# Patient Record
Sex: MALE | Race: WHITE | NOT HISPANIC OR LATINO | Employment: OTHER | URBAN - METROPOLITAN AREA
[De-identification: names, ages, dates, MRNs, and addresses within clinical notes are randomized per-mention and may not be internally consistent; named-entity substitution may affect disease eponyms.]

---

## 2023-09-12 ENCOUNTER — HOSPITAL ENCOUNTER (OUTPATIENT)
Dept: DATA CONVERSION | Facility: HOSPITAL | Age: 78
Discharge: HOME | End: 2023-09-12
Payer: MEDICARE

## 2023-09-12 DIAGNOSIS — I10 ESSENTIAL (PRIMARY) HYPERTENSION: ICD-10-CM

## 2023-09-12 LAB
ALBUMIN SERPL-MCNC: 2.8 GM/DL (ref 3.5–5)
ALBUMIN/GLOB SERPL: 0.8 RATIO (ref 1.5–3)
ALP BLD-CCNC: 114 U/L (ref 35–125)
ALT SERPL-CCNC: 18 U/L (ref 5–40)
ANION GAP SERPL CALCULATED.3IONS-SCNC: 10 MMOL/L (ref 0–19)
AST SERPL-CCNC: 25 U/L (ref 5–40)
BILIRUB SERPL-MCNC: 0.3 MG/DL (ref 0.1–1.2)
BUN SERPL-MCNC: 20 MG/DL (ref 8–25)
BUN/CREAT SERPL: 25 RATIO (ref 8–21)
CALCIUM SERPL-MCNC: 9.2 MG/DL (ref 8.5–10.4)
CHLORIDE SERPL-SCNC: 107 MMOL/L (ref 97–107)
CO2 SERPL-SCNC: 25 MMOL/L (ref 24–31)
CREAT SERPL-MCNC: 0.8 MG/DL (ref 0.4–1.6)
DEPRECATED RDW RBC AUTO: 46.4 FL (ref 37–54)
ERYTHROCYTE [DISTWIDTH] IN BLOOD BY AUTOMATED COUNT: 13 % (ref 11.7–15)
GFR SERPL CREATININE-BSD FRML MDRD: 91 ML/MIN/1.73 M2
GLOBULIN SER-MCNC: 3.3 G/DL (ref 1.9–3.7)
GLUCOSE SERPL-MCNC: 77 MG/DL (ref 65–99)
HCT VFR BLD AUTO: 37.9 % (ref 41–50)
HGB BLD-MCNC: 12.2 GM/DL (ref 13.5–16.5)
MCH RBC QN AUTO: 31 PG (ref 26–34)
MCHC RBC AUTO-ENTMCNC: 32.2 % (ref 31–37)
MCV RBC AUTO: 96.4 FL (ref 80–100)
NRBC BLD-RTO: 0 /100 WBC
PLATELET # BLD AUTO: 244 K/UL (ref 150–450)
PMV BLD AUTO: 10.5 CU (ref 7–12.6)
POTASSIUM SERPL-SCNC: 4.4 MMOL/L (ref 3.4–5.1)
PROT SERPL-MCNC: 6.1 G/DL (ref 5.9–7.9)
RBC # BLD AUTO: 3.93 M/UL (ref 4.5–5.5)
SODIUM SERPL-SCNC: 141 MMOL/L (ref 133–145)
WBC # BLD AUTO: 5.7 K/UL (ref 4.5–11)

## 2023-09-17 PROBLEM — J34.89 NASAL OBSTRUCTION: Status: ACTIVE | Noted: 2023-09-17

## 2023-09-17 PROBLEM — J32.4 CHRONIC PANSINUSITIS: Status: ACTIVE | Noted: 2023-09-17

## 2023-09-17 PROBLEM — J33.0 NASAL CAVITY POLYP: Status: ACTIVE | Noted: 2023-09-17

## 2023-09-17 PROBLEM — I10 HYPERTENSION: Status: ACTIVE | Noted: 2023-09-17

## 2023-09-17 PROBLEM — E78.00 PURE HYPERCHOLESTEROLEMIA: Status: ACTIVE | Noted: 2023-09-17

## 2023-09-17 PROBLEM — R63.4 WEIGHT LOSS: Status: ACTIVE | Noted: 2023-09-17

## 2023-09-17 PROBLEM — R43.0 ANOSMIA: Status: ACTIVE | Noted: 2023-09-17

## 2023-09-17 PROBLEM — S39.92XA BACK INJURY: Status: ACTIVE | Noted: 2023-09-17

## 2023-09-17 PROBLEM — J32.1 CHRONIC FRONTAL SINUSITIS: Status: ACTIVE | Noted: 2023-09-17

## 2023-09-17 PROBLEM — N40.0 BENIGN PROSTATIC HYPERPLASIA: Status: ACTIVE | Noted: 2023-09-17

## 2023-09-17 PROBLEM — J34.89 RHINORRHEA: Status: ACTIVE | Noted: 2023-09-17

## 2023-09-17 PROBLEM — R29.6 RECURRENT FALLS: Status: ACTIVE | Noted: 2023-09-17

## 2023-09-17 PROBLEM — D72.819 LEUKOPENIA: Status: ACTIVE | Noted: 2023-09-17

## 2023-09-17 PROBLEM — S69.90XA INJURY OF WRIST: Status: ACTIVE | Noted: 2023-09-17

## 2023-09-17 PROBLEM — H90.3 ASYMMETRICAL SENSORINEURAL HEARING LOSS: Status: ACTIVE | Noted: 2023-09-17
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
## 2023-09-17 PROBLEM — S09.90XA INJURY OF HEAD: Status: ACTIVE | Noted: 2023-09-17

## 2023-09-17 PROBLEM — I83.93 VARICOSE VEINS OF BOTH LOWER EXTREMITIES: Status: ACTIVE | Noted: 2023-09-17

## 2023-09-17 PROBLEM — J32.2 CHRONIC ETHMOIDAL SINUSITIS: Status: ACTIVE | Noted: 2023-09-17

## 2023-09-17 PROBLEM — R26.2 DISABILITY OF WALKING: Status: ACTIVE | Noted: 2023-09-17

## 2023-09-17 PROBLEM — R04.0 LEFT-SIDED EPISTAXIS: Status: ACTIVE | Noted: 2023-09-17

## 2023-09-17 PROBLEM — R53.1 ASTHENIA: Status: ACTIVE | Noted: 2023-09-17

## 2023-09-17 PROBLEM — H93.13 TINNITUS OF BOTH EARS: Status: ACTIVE | Noted: 2023-09-17

## 2023-09-17 PROBLEM — Z87.39 H/O DEGENERATIVE DISC DISEASE: Status: ACTIVE | Noted: 2023-09-17

## 2023-09-17 PROBLEM — R43.8 DECREASED SENSE OF SMELL: Status: ACTIVE | Noted: 2023-09-17

## 2023-09-17 PROBLEM — M51.34 DEGENERATION OF THORACIC INTERVERTEBRAL DISC: Status: ACTIVE | Noted: 2023-09-17

## 2023-09-17 PROBLEM — H69.92 DYSFUNCTION OF LEFT EUSTACHIAN TUBE: Status: ACTIVE | Noted: 2023-09-17

## 2023-09-17 RX ORDER — AZELASTINE HCL 205.5 UG/1
2 SPRAY NASAL 2 TIMES DAILY
COMMUNITY
Start: 2015-07-13 | End: 2024-04-11 | Stop reason: WASHOUT

## 2023-09-17 RX ORDER — DOCUSATE SODIUM 100 MG/1
100 CAPSULE, LIQUID FILLED ORAL DAILY
COMMUNITY

## 2023-09-17 RX ORDER — IBUPROFEN 200 MG
400 TABLET ORAL 2 TIMES DAILY PRN
COMMUNITY

## 2023-09-17 RX ORDER — CETIRIZINE HYDROCHLORIDE 10 MG/1
10 TABLET, ORALLY DISINTEGRATING ORAL DAILY PRN
COMMUNITY
End: 2024-04-11 | Stop reason: WASHOUT

## 2023-09-17 RX ORDER — FLUTICASONE PROPIONATE 50 MCG
2 SPRAY, SUSPENSION (ML) NASAL DAILY
COMMUNITY
Start: 2015-10-14 | End: 2024-04-11 | Stop reason: WASHOUT

## 2023-09-17 RX ORDER — HYDROCHLOROTHIAZIDE 25 MG/1
25 TABLET ORAL DAILY
COMMUNITY
Start: 2023-08-10 | End: 2023-12-26

## 2023-09-17 RX ORDER — TAMSULOSIN HYDROCHLORIDE 0.4 MG/1
0.4 CAPSULE ORAL DAILY
COMMUNITY
Start: 2022-11-21 | End: 2023-02-19

## 2023-12-12 ENCOUNTER — TELEPHONE (OUTPATIENT)
Dept: PRIMARY CARE | Facility: CLINIC | Age: 78
End: 2023-12-12
Payer: MEDICARE

## 2023-12-12 DIAGNOSIS — R30.0 DYSURIA: Primary | ICD-10-CM

## 2023-12-12 NOTE — TELEPHONE ENCOUNTER
Pt has frequency of urination, itching in perineal area, and incontinent. Pt in wheelchair, spouse asks for UA order.  Order or appt with NP?

## 2023-12-13 ENCOUNTER — LAB (OUTPATIENT)
Dept: LAB | Facility: LAB | Age: 78
End: 2023-12-13
Payer: MEDICARE

## 2023-12-13 DIAGNOSIS — R30.0 DYSURIA: ICD-10-CM

## 2023-12-13 LAB
APPEARANCE UR: CLEAR
BILIRUB UR STRIP.AUTO-MCNC: NEGATIVE MG/DL
COLOR UR: NORMAL
GLUCOSE UR STRIP.AUTO-MCNC: NORMAL MG/DL
KETONES UR STRIP.AUTO-MCNC: NEGATIVE MG/DL
LEUKOCYTE ESTERASE UR QL STRIP.AUTO: NEGATIVE
NITRITE UR QL STRIP.AUTO: NEGATIVE
PH UR STRIP.AUTO: 7 [PH]
PROT UR STRIP.AUTO-MCNC: NEGATIVE MG/DL
RBC # UR STRIP.AUTO: NEGATIVE /UL
SP GR UR STRIP.AUTO: 1.02
UROBILINOGEN UR STRIP.AUTO-MCNC: NORMAL MG/DL

## 2023-12-13 PROCEDURE — 87086 URINE CULTURE/COLONY COUNT: CPT

## 2023-12-13 PROCEDURE — 81003 URINALYSIS AUTO W/O SCOPE: CPT

## 2023-12-14 ENCOUNTER — TELEPHONE (OUTPATIENT)
Dept: PRIMARY CARE | Facility: CLINIC | Age: 78
End: 2023-12-14
Payer: MEDICARE

## 2023-12-14 DIAGNOSIS — R32 URINARY INCONTINENCE, UNSPECIFIED TYPE: Primary | ICD-10-CM

## 2023-12-14 PROBLEM — R26.2 DIFFICULTY IN WALKING, NOT ELSEWHERE CLASSIFIED: Status: RESOLVED | Noted: 2023-04-26 | Resolved: 2023-12-14

## 2023-12-14 PROBLEM — M41.9 SCOLIOSIS, UNSPECIFIED: Status: ACTIVE | Noted: 2023-04-26

## 2023-12-14 PROBLEM — L03.116 CELLULITIS OF LEFT LOWER LIMB: Status: RESOLVED | Noted: 2023-04-26 | Resolved: 2023-12-14

## 2023-12-14 PROBLEM — M54.30 SCIATICA, UNSPECIFIED SIDE: Status: ACTIVE | Noted: 2023-04-26

## 2023-12-14 PROBLEM — R41.841 COGNITIVE COMMUNICATION DEFICIT: Status: RESOLVED | Noted: 2023-04-26 | Resolved: 2023-12-14

## 2023-12-14 PROBLEM — M62.81 MUSCLE WEAKNESS (GENERALIZED): Status: RESOLVED | Noted: 2023-04-26 | Resolved: 2023-12-14

## 2023-12-14 PROBLEM — R29.6 REPEATED FALLS: Status: RESOLVED | Noted: 2023-04-26 | Resolved: 2023-12-14

## 2023-12-14 NOTE — TELEPHONE ENCOUNTER
Per Dr Mascorro:  Yes need to see urology  He might also be having retention of urine .Will put in referral for   If symptoms persist then will need to be  seen in ER.   Spoke with pt's wife, gave her dr faria's number

## 2023-12-14 NOTE — TELEPHONE ENCOUNTER
"Spoke with patient's wife and gave her results. She said that \"well something is going on, he's peeing all the time and he's leaking as well, he can't stop it.\"  Do you want him to see urology?  "

## 2023-12-16 LAB — BACTERIA UR CULT: NORMAL

## 2023-12-23 DIAGNOSIS — I10 ESSENTIAL (PRIMARY) HYPERTENSION: ICD-10-CM

## 2023-12-26 RX ORDER — HYDROCHLOROTHIAZIDE 25 MG/1
TABLET ORAL
Qty: 90 TABLET | Refills: 0 | Status: SHIPPED | OUTPATIENT
Start: 2023-12-26 | End: 2024-04-11 | Stop reason: WASHOUT

## 2024-03-08 PROBLEM — J33.0 POLYP OF NASAL CAVITY: Status: RESOLVED | Noted: 2024-03-08 | Resolved: 2024-03-08

## 2024-03-08 PROBLEM — S00.83XA CONTUSION OF FACE: Status: RESOLVED | Noted: 2024-03-08 | Resolved: 2024-03-08

## 2024-03-08 PROBLEM — S01.81XA FACIAL LACERATION: Status: RESOLVED | Noted: 2024-03-08 | Resolved: 2024-03-08

## 2024-03-08 PROBLEM — R10.9 ABDOMINAL PAIN: Status: RESOLVED | Noted: 2024-03-08 | Resolved: 2024-03-08

## 2024-03-13 ENCOUNTER — APPOINTMENT (OUTPATIENT)
Dept: RADIOLOGY | Facility: HOSPITAL | Age: 79
End: 2024-03-13
Payer: MEDICARE

## 2024-03-13 ENCOUNTER — HOSPITAL ENCOUNTER (EMERGENCY)
Facility: HOSPITAL | Age: 79
Discharge: HOME | End: 2024-03-13
Attending: STUDENT IN AN ORGANIZED HEALTH CARE EDUCATION/TRAINING PROGRAM
Payer: MEDICARE

## 2024-03-13 VITALS
RESPIRATION RATE: 16 BRPM | TEMPERATURE: 98.6 F | SYSTOLIC BLOOD PRESSURE: 134 MMHG | OXYGEN SATURATION: 98 % | HEART RATE: 70 BPM | DIASTOLIC BLOOD PRESSURE: 76 MMHG

## 2024-03-13 DIAGNOSIS — S42.032A DISPLACED FRACTURE OF LATERAL END OF LEFT CLAVICLE, INITIAL ENCOUNTER FOR CLOSED FRACTURE: Primary | ICD-10-CM

## 2024-03-13 PROCEDURE — 73000 X-RAY EXAM OF COLLAR BONE: CPT | Mod: LEFT SIDE | Performed by: RADIOLOGY

## 2024-03-13 PROCEDURE — 73030 X-RAY EXAM OF SHOULDER: CPT | Mod: LEFT SIDE | Performed by: RADIOLOGY

## 2024-03-13 PROCEDURE — 73000 X-RAY EXAM OF COLLAR BONE: CPT | Mod: LT

## 2024-03-13 PROCEDURE — 99284 EMERGENCY DEPT VISIT MOD MDM: CPT

## 2024-03-13 PROCEDURE — 2500000001 HC RX 250 WO HCPCS SELF ADMINISTERED DRUGS (ALT 637 FOR MEDICARE OP): Performed by: NURSE PRACTITIONER

## 2024-03-13 PROCEDURE — 73030 X-RAY EXAM OF SHOULDER: CPT | Mod: LT

## 2024-03-13 RX ORDER — ACETAMINOPHEN 325 MG/1
975 TABLET ORAL ONCE
Status: COMPLETED | OUTPATIENT
Start: 2024-03-13 | End: 2024-03-13

## 2024-03-13 RX ORDER — ACETAMINOPHEN 500 MG
1000 TABLET ORAL 2 TIMES DAILY
Qty: 28 TABLET | Refills: 0 | Status: SHIPPED | OUTPATIENT
Start: 2024-03-13 | End: 2024-03-20

## 2024-03-13 RX ORDER — OXYCODONE AND ACETAMINOPHEN 5; 325 MG/1; MG/1
1 TABLET ORAL 2 TIMES DAILY
Qty: 6 TABLET | Refills: 0 | Status: SHIPPED | OUTPATIENT
Start: 2024-03-13 | End: 2024-03-16

## 2024-03-13 RX ORDER — NAPROXEN 500 MG/1
500 TABLET ORAL
Qty: 14 TABLET | Refills: 0 | Status: SHIPPED | OUTPATIENT
Start: 2024-03-13 | End: 2024-03-20

## 2024-03-13 RX ORDER — NAPROXEN 250 MG/1
500 TABLET ORAL ONCE
Status: COMPLETED | OUTPATIENT
Start: 2024-03-13 | End: 2024-03-13

## 2024-03-13 RX ADMIN — NAPROXEN 500 MG: 250 TABLET ORAL at 17:20

## 2024-03-13 RX ADMIN — ACETAMINOPHEN 975 MG: 325 TABLET ORAL at 17:20

## 2024-03-13 ASSESSMENT — COLUMBIA-SUICIDE SEVERITY RATING SCALE - C-SSRS
6. HAVE YOU EVER DONE ANYTHING, STARTED TO DO ANYTHING, OR PREPARED TO DO ANYTHING TO END YOUR LIFE?: NO
2. HAVE YOU ACTUALLY HAD ANY THOUGHTS OF KILLING YOURSELF?: NO
1. IN THE PAST MONTH, HAVE YOU WISHED YOU WERE DEAD OR WISHED YOU COULD GO TO SLEEP AND NOT WAKE UP?: NO

## 2024-03-13 ASSESSMENT — LIFESTYLE VARIABLES
EVER HAD A DRINK FIRST THING IN THE MORNING TO STEADY YOUR NERVES TO GET RID OF A HANGOVER: NO
EVER FELT BAD OR GUILTY ABOUT YOUR DRINKING: NO
HAVE PEOPLE ANNOYED YOU BY CRITICIZING YOUR DRINKING: NO
HAVE YOU EVER FELT YOU SHOULD CUT DOWN ON YOUR DRINKING: NO

## 2024-03-13 ASSESSMENT — PAIN SCALES - GENERAL: PAINLEVEL_OUTOF10: 7

## 2024-03-13 ASSESSMENT — PAIN DESCRIPTION - ORIENTATION: ORIENTATION: LEFT

## 2024-03-13 ASSESSMENT — PAIN DESCRIPTION - LOCATION: LOCATION: SHOULDER

## 2024-03-13 ASSESSMENT — PAIN DESCRIPTION - PAIN TYPE: TYPE: ACUTE PAIN

## 2024-03-13 ASSESSMENT — PAIN DESCRIPTION - DESCRIPTORS: DESCRIPTORS: ACHING;SHARP

## 2024-03-13 ASSESSMENT — PAIN - FUNCTIONAL ASSESSMENT: PAIN_FUNCTIONAL_ASSESSMENT: 0-10

## 2024-03-13 NOTE — ED PROVIDER NOTES
"HPI   Chief Complaint   Patient presents with    Shoulder Pain     Tripped and fell in the house @ noon.  Shoulder cracked and disfigured.        78-year-old male presents today with acute left shoulder injury.  He is wheelchair-bound due to having curvature of the spine and wife indicates that \"he thinks he can walk when he really should be walking\".  She indicates that his wheelchair was not locked and it went backwards while he was going to stand up and he felt forward landing on his left shoulder.  She denies that he struck his head.  She denies posterior neck pain.  She denies a complaint from her  of chest pain or weakness.  She denies any complaint of nausea vomiting or abdominal pain.  He is not on any anticoagulant medication and wife states \"he has not been on any medication and does not need any medication\".  His vital signs are very stable.  Wife indicates that he has a history of dementia and he he is cared for by wife and sons who do not live at home but they come if wife needs him.  Wife also indicates that he has no history of Parkinson's and she denies any recent shaking.  Their pharmacy is at [facility].  Wife denies any change in mentation.  She denies any neurodeficit except for memory and wife indicates he can remember everything remotely but he has more difficulty with short-term memory.      History provided by:  Patient and spouse   used: No                        [place] Coma Scale Score: 15         NIH Stroke Scale: 0             Patient History   Past Medical History:   Diagnosis Date    Abdominal pain 03/08/2024    Allergic rhinitis     Arthritis     Back pain     BPH (benign prostatic hyperplasia)     Cellulitis of left lower limb 04/26/2023    Contusion of face 03/08/2024    DDD (degenerative disc disease), lumbar     DDD (degenerative disc disease), thoracic     Difficulty in walking, not elsewhere classified 04/26/2023    " Facial laceration 03/08/2024    Hiatal hernia     HTN (hypertension)     Hypercholesteremia     Leukopenia     Muscle weakness (generalized) 04/26/2023    Personal history of other specified conditions 06/23/2014    History of shortness of breath    Polyp of nasal cavity 03/08/2024    Repeated falls 04/26/2023    Varicose veins of both legs with edema      Past Surgical History:   Procedure Laterality Date    MYRINGOTOMY W/ TUBES  10/30/2013    Myringotomy - With Ventilating Tube Insertion    SEPTOPLASTY  09/16/2014    Septoplasty    TONSILLECTOMY  10/30/2013    Tonsillectomy With Adenoidectomy     Family History   Problem Relation Name Age of Onset    Hypertension Mother      Dementia Mother      Lung cancer Father      Stroke Other Grandfather      Social History     Tobacco Use    Smoking status: Not on file    Smokeless tobacco: Not on file   Substance Use Topics    Alcohol use: Not on file    Drug use: Not on file       Physical Exam   ED Triage Vitals [03/13/24 1353]   Temperature Heart Rate Respirations BP   37 °C (98.6 °F) 70 16 134/76      Pulse Ox Temp Source Heart Rate Source Patient Position   98 % Skin Monitor Sitting      BP Location FiO2 (%)     Left arm --       Physical Exam  Constitutional:       Appearance: Normal appearance.   HENT:      Head: Normocephalic and atraumatic.      Right Ear: Tympanic membrane normal.      Left Ear: Tympanic membrane normal.      Nose: Nose normal.      Mouth/Throat:      Mouth: Mucous membranes are dry.   Eyes:      Extraocular Movements: Extraocular movements intact.      Pupils: Pupils are equal, round, and reactive to light.   Cardiovascular:      Rate and Rhythm: Normal rate and regular rhythm.      Pulses: Normal pulses.      Heart sounds: Normal heart sounds.   Pulmonary:      Effort: Pulmonary effort is normal.      Breath sounds: Normal breath sounds.   Abdominal:      General: Abdomen is flat.      Palpations: Abdomen is soft.   Musculoskeletal:          General: Tenderness present.      Comments: Limited range of motion of left upper extremity with radial pulse 2/2.  He is able to move elbow without difficulty.  He is able to move wrist and make a fist with his left hand.  It is painful for him to lift his arm more than 30 degrees.   Skin:     General: Skin is warm.      Capillary Refill: Capillary refill takes less than 2 seconds.   Neurological:      General: No focal deficit present.      Mental Status: He is alert and oriented to person, place, and time.      Comments: NIH is 0 stroke van negative.  There was no neurodeficit and patient was responding to all questions.  He could identify a pen.  He knew he was in the hospital.  He knew that his wife was sitting beside him.  No neglect appreciated.   Psychiatric:         Mood and Affect: Mood normal.         Behavior: Behavior normal.         ED Course & MDM   Diagnoses as of 03/13/24 1729   Displaced fracture of lateral end of left clavicle, initial encounter for closed fracture       Medical Decision Making  X-ray confirmed a displaced fracture of the distal left clavicle similar to the earlier study of the same day.  There was no acute bony abnormality of the rest of the shoulder.  Note was sent to orthopedic on-call that we will have patient follow outpatient and a sling was ordered.  He can use Motrin and Tylenol for limited pain and I gave him a limited supply of Percocet for severe pain.  Patient appears to be well cared for by wife.  NIH was 0.  Stroke van was negative.  He was denying any chest pain or weakness and the fall was mechanical.  Safely discharged home with contact information for orthopedic surgery and I requested appointment.    Amount and/or Complexity of Data Reviewed  Radiology: ordered and independent interpretation performed.        Procedure  Procedures     SANCHO Paez-JUVENAL  03/13/24 1728

## 2024-03-27 ENCOUNTER — OFFICE VISIT (OUTPATIENT)
Dept: ORTHOPEDIC SURGERY | Facility: CLINIC | Age: 79
End: 2024-03-27
Payer: MEDICARE

## 2024-03-27 ENCOUNTER — HOSPITAL ENCOUNTER (OUTPATIENT)
Dept: RADIOLOGY | Facility: HOSPITAL | Age: 79
Discharge: HOME | End: 2024-03-27
Payer: MEDICARE

## 2024-03-27 VITALS — HEIGHT: 69 IN | BODY MASS INDEX: 22.96 KG/M2 | WEIGHT: 155 LBS

## 2024-03-27 DIAGNOSIS — S42.022A DISPLACED FRACTURE OF SHAFT OF LEFT CLAVICLE, INITIAL ENCOUNTER FOR CLOSED FRACTURE: Primary | ICD-10-CM

## 2024-03-27 DIAGNOSIS — M25.512 LEFT SHOULDER PAIN, UNSPECIFIED CHRONICITY: ICD-10-CM

## 2024-03-27 PROCEDURE — 99204 OFFICE O/P NEW MOD 45 MIN: CPT | Performed by: ORTHOPAEDIC SURGERY

## 2024-03-27 PROCEDURE — 1159F MED LIST DOCD IN RCRD: CPT | Performed by: ORTHOPAEDIC SURGERY

## 2024-03-27 PROCEDURE — 1125F AMNT PAIN NOTED PAIN PRSNT: CPT | Performed by: ORTHOPAEDIC SURGERY

## 2024-03-27 PROCEDURE — 73030 X-RAY EXAM OF SHOULDER: CPT | Mod: LT

## 2024-03-27 PROCEDURE — 73030 X-RAY EXAM OF SHOULDER: CPT | Mod: LEFT SIDE | Performed by: STUDENT IN AN ORGANIZED HEALTH CARE EDUCATION/TRAINING PROGRAM

## 2024-03-27 PROCEDURE — 23500 CLTX CLAVICULAR FX W/O MNPJ: CPT | Performed by: ORTHOPAEDIC SURGERY

## 2024-03-27 PROCEDURE — 1160F RVW MEDS BY RX/DR IN RCRD: CPT | Performed by: ORTHOPAEDIC SURGERY

## 2024-03-27 PROCEDURE — 1036F TOBACCO NON-USER: CPT | Performed by: ORTHOPAEDIC SURGERY

## 2024-03-27 ASSESSMENT — PAIN SCALES - GENERAL: PAINLEVEL_OUTOF10: 8

## 2024-03-27 ASSESSMENT — PAIN - FUNCTIONAL ASSESSMENT: PAIN_FUNCTIONAL_ASSESSMENT: 0-10

## 2024-03-27 NOTE — PROGRESS NOTES
78-year-old male with dementia and severe kyphosis presents with his wife.  The patient does not talk very much so history is required to get from the patient's wife.  Patient states he has had severe kyphosis for at least the last 3 years and has been getting dementia.  He fell 2 weeks ago and injured the left shoulder.  He has not been complaining of too much pain recently but states pain is worse when he raises the arm    Patients' self reported past medical history, medications, allergies, surgical history, family and social history as well as a 10 point review of systems has been documented in the new patient intake form and scanned into the patient's electronic medical record.  The intake form was reviewed by Dr Han during the office visit and signed by Dr. Han and the patient.  Pertinent findings are documented in the HPI.    General Multi-System Physical Exam:  Constitutional  General appearance:  Alert, oriented, and in no acute distress.  Well developed, well nourished.  Head and Face  Head and face:  Normocephalic and atraumatic.  Ears, Nose, Mouth, and Throat  External inspection of ears and nose: Normal.  Eyes:  Pupils are equal and round.  Neck  Neck:  no neck mass was observed.  Pulmonary  Respiratory effort:  no respiratory distress.  Cardiovascular  Intact distal pulses.  Lymphatic  Palpation of lymph nodes in the affected extremity:  Normal.  Skin  Skin and subcutaneous tissue:  Normal skin color and pigmentation.  Normal skin turgor.  No rashes.  Neurologic  Sensation:  normal to light touch.  Psychiatric  Judgement and insight:  Intact.  Mood and affect:  Normal.  Musculoskeletal  The patient is severely kyphotic with very little motion of his neck and shoulders.  He has mild pain at the palpable deformity of his left clavicle with no skin tenting.  Grossly neurovascular tact left upper extremity    X-rays of the patient were ordered by Dr Han and obtained today.  Dr Han personally  reviewed the results of the x-rays.    In addition, Dr Han independently interpreted the patient's x-rays (performed by the Radiology department) by viewing the x-ray images and this is Dr. Han's personal interpretation:     Left clavicle fracture midshaft displaced    I explained to them the clavicle fractures generally heal very well and hopefully this will heal.  However the fracture is displaced.  Due to the patient's severe medical comorbidities with the severe kyphosis and dementia, this patient is not a surgical candidate.  Will therefore treat him conservatively and hopefully the fracture will heal but due to his kyphotic deformity we cannot perform plating of the clavicle even if we wanted to.  I will see him back in 4 to 6 weeks for new x-rays.  Told him to call with questions or problems    This is an acute injury complicated by left clavicle fracture with severe medical comorbidities      Due to this patient's condition, they are at a moderate risk of morbidity from additional diagnostic testing / treatment.

## 2024-04-09 PROBLEM — R04.0 EPISTAXIS: Status: RESOLVED | Noted: 2023-09-17 | Resolved: 2024-04-09

## 2024-04-09 PROBLEM — S42.033A CLOSED FRACTURE OF ACROMIAL END OF CLAVICLE: Status: RESOLVED | Noted: 2024-04-09 | Resolved: 2024-04-09

## 2024-04-09 PROBLEM — J34.89 NASAL DISCHARGE: Status: RESOLVED | Noted: 2023-09-17 | Resolved: 2024-04-09

## 2024-04-09 PROBLEM — R53.1 WEAKNESS: Status: RESOLVED | Noted: 2023-04-25 | Resolved: 2024-04-09

## 2024-04-09 PROBLEM — S39.92XA INJURY OF BACK: Status: RESOLVED | Noted: 2023-09-17 | Resolved: 2024-04-09

## 2024-04-09 PROBLEM — H69.90 DYSFUNCTION OF EUSTACHIAN TUBE: Status: RESOLVED | Noted: 2023-09-17 | Resolved: 2024-04-09

## 2024-04-09 PROBLEM — J32.0 CHRONIC MAXILLARY SINUSITIS: Status: RESOLVED | Noted: 2023-09-17 | Resolved: 2024-04-09

## 2024-04-09 PROBLEM — M25.512 PAIN OF LEFT SHOULDER REGION: Status: RESOLVED | Noted: 2024-04-09 | Resolved: 2024-04-09

## 2024-04-09 PROBLEM — M41.9 SCOLIOSIS: Status: RESOLVED | Noted: 2023-04-26 | Resolved: 2024-04-09

## 2024-04-11 ENCOUNTER — OFFICE VISIT (OUTPATIENT)
Dept: PRIMARY CARE | Facility: CLINIC | Age: 79
End: 2024-04-11
Payer: MEDICARE

## 2024-04-11 VITALS
DIASTOLIC BLOOD PRESSURE: 70 MMHG | SYSTOLIC BLOOD PRESSURE: 122 MMHG | HEART RATE: 64 BPM | TEMPERATURE: 97.5 F | HEIGHT: 69 IN | OXYGEN SATURATION: 99 % | BODY MASS INDEX: 22.96 KG/M2 | WEIGHT: 155 LBS

## 2024-04-11 DIAGNOSIS — R26.2 DISABILITY OF WALKING: ICD-10-CM

## 2024-04-11 DIAGNOSIS — I83.93 VARICOSE VEINS OF BOTH LOWER EXTREMITIES, UNSPECIFIED WHETHER COMPLICATED: Primary | ICD-10-CM

## 2024-04-11 DIAGNOSIS — R29.6 RECURRENT FALLS: ICD-10-CM

## 2024-04-11 DIAGNOSIS — E55.9 VITAMIN D DEFICIENCY: ICD-10-CM

## 2024-04-11 DIAGNOSIS — N40.1 BENIGN PROSTATIC HYPERPLASIA WITH URINARY FREQUENCY: ICD-10-CM

## 2024-04-11 DIAGNOSIS — M41.9 SCOLIOSIS OF THORACOLUMBAR SPINE, UNSPECIFIED SCOLIOSIS TYPE: ICD-10-CM

## 2024-04-11 DIAGNOSIS — D64.9 ANEMIA, UNSPECIFIED TYPE: ICD-10-CM

## 2024-04-11 DIAGNOSIS — R35.0 BENIGN PROSTATIC HYPERPLASIA WITH URINARY FREQUENCY: ICD-10-CM

## 2024-04-11 PROCEDURE — 3074F SYST BP LT 130 MM HG: CPT | Performed by: INTERNAL MEDICINE

## 2024-04-11 PROCEDURE — 1160F RVW MEDS BY RX/DR IN RCRD: CPT | Performed by: INTERNAL MEDICINE

## 2024-04-11 PROCEDURE — 3078F DIAST BP <80 MM HG: CPT | Performed by: INTERNAL MEDICINE

## 2024-04-11 PROCEDURE — 1159F MED LIST DOCD IN RCRD: CPT | Performed by: INTERNAL MEDICINE

## 2024-04-11 PROCEDURE — 99214 OFFICE O/P EST MOD 30 MIN: CPT | Performed by: INTERNAL MEDICINE

## 2024-04-11 PROCEDURE — 1125F AMNT PAIN NOTED PAIN PRSNT: CPT | Performed by: INTERNAL MEDICINE

## 2024-04-11 ASSESSMENT — PATIENT HEALTH QUESTIONNAIRE - PHQ9
SUM OF ALL RESPONSES TO PHQ9 QUESTIONS 1 AND 2: 0
1. LITTLE INTEREST OR PLEASURE IN DOING THINGS: NOT AT ALL
2. FEELING DOWN, DEPRESSED OR HOPELESS: NOT AT ALL

## 2024-04-11 ASSESSMENT — ENCOUNTER SYMPTOMS
DEPRESSION: 0
OCCASIONAL FEELINGS OF UNSTEADINESS: 1
LOSS OF SENSATION IN FEET: 0

## 2024-04-11 ASSESSMENT — PAIN SCALES - GENERAL: PAINLEVEL: 8

## 2024-04-17 NOTE — PROGRESS NOTES
[facility]: [facility]  PROGRESS NOTE      Elian Betancourt is a 78 y.o. male that is being seen  today for Follow-up (6 months).  Subjective   Patient is a 78-year-old male who is being seen for follow-up exam.  Patient has pain at his baseline.  Patient does have scoliosis of his spine as well as has varicose veins.  Patient is due for his blood work to be done.  Denies any significant new complaints.  Patient does have difficulty in walking      ROS  Negative for fever or chills  Negative for sore throat, ear pain, nasal discharge  Negative for cough, shortness of breath or wheezing  Negative for chest pain, palpitations, swelling of legs.  Patient has varicose veins of both legs  Negative for abdominal pain, constipation, diarrhea, blood in the stools  Negative for urinary complaints  Negative for headache, dizziness, weakness or numbness  Positive for back pain and difficulty in walking   All other systems reviewed and were negative   Vitals:    04/11/24 1339   BP: 122/70   Pulse: 64   Temp: 36.4 °C (97.5 °F)   SpO2: 99%      Vitals:    04/11/24 1339   Weight: 70.3 kg (155 lb)     Body mass index is 22.89 kg/m².  Physical Exam  Constitutional: Patient does not appear to be in any acute distress  Head and Face: NCAT  Eyes: Normal external exam, EOMI  ENT: Normal external inspection of ears and nose. Oropharynx normal.  Cardiovascular: RRR, S1/S2, no murmurs, rubs, or gallops, radial pulses +2, no edema of extremities  Pulmonary: CTAB, no respiratory distress.  Abdomen: +BS, soft, non-tender, nondistended, no guarding or rebound, no masses noted  MSK: No joint swelling, normal movements of all extremities. Range of motion- normal.  Skin- No lesions, contusions, or erythema.  Peripheral puslses palpable bilaterally 2+  Neuro: AAO X3, Cranial nerves 2-12 grossly intact,DTR 2+ in all 4 limbs   Psychiatric: Judgment intact. Appropriate mood and behavior    LABS   [unfilled]  Lab Results  "  Component Value Date    GLUCOSE 77 09/12/2023    CALCIUM 9.2 09/12/2023     09/12/2023    K 4.4 09/12/2023    CO2 25 09/12/2023     09/12/2023    BUN 20 09/12/2023    CREATININE 0.8 09/12/2023     Lab Results   Component Value Date    ALT 18 09/12/2023    AST 25 09/12/2023    ALKPHOS 114 09/12/2023    BILITOT 0.3 09/12/2023     Lab Results   Component Value Date    WBC 5.7 09/12/2023    HGB 12.2 (L) 09/12/2023    HCT 37.9 (L) 09/12/2023    MCV 96.4 09/12/2023     09/12/2023     Lab Results   Component Value Date    CHOL 174 06/06/2023    CHOL 188 12/02/2021    CHOL 197 06/04/2021     Lab Results   Component Value Date    HDL 85 06/06/2023    HDL 82 12/02/2021    HDL 78 06/04/2021     Lab Results   Component Value Date    LDLCALC 81 06/06/2023    LDLCALC 98 12/02/2021    LDLCALC 109 06/04/2021     Lab Results   Component Value Date    TRIG 38 (L) 06/06/2023    TRIG 38 (L) 12/02/2021    TRIG 52 06/04/2021     No results found for: \"HGBA1C\"  Other labs not included in the list above were reviewed either before or during this encounter.    History    Past Medical History:   Diagnosis Date    Abdominal pain 03/08/2024    Allergic rhinitis     Arthritis     Back pain     BPH (benign prostatic hyperplasia)     Cellulitis of left lower limb 04/26/2023    Chronic maxillary sinusitis 09/17/2023    Closed fracture of acromial end of clavicle 04/09/2024    Contusion of face 03/08/2024    DDD (degenerative disc disease), lumbar     DDD (degenerative disc disease), thoracic     Difficulty in walking, not elsewhere classified 04/26/2023    Dysfunction of eustachian tube 09/17/2023    Epistaxis 09/17/2023    Facial laceration 03/08/2024    Hiatal hernia     HTN (hypertension)     Hypercholesteremia     Injury of back 09/17/2023    Leukopenia     Muscle weakness (generalized) 04/26/2023    Nasal discharge 09/17/2023    Pain of left shoulder region 04/09/2024    Personal history of other specified conditions " 06/23/2014    History of shortness of breath    Polyp of nasal cavity 03/08/2024    Repeated falls 04/26/2023    Scoliosis 04/26/2023    Varicose veins of both legs with edema     Weakness 04/25/2023     Past Surgical History:   Procedure Laterality Date    MYRINGOTOMY W/ TUBES  10/30/2013    Myringotomy - With Ventilating Tube Insertion    SEPTOPLASTY  09/16/2014    Septoplasty    TONSILLECTOMY  10/30/2013    Tonsillectomy With Adenoidectomy     Family History   Problem Relation Name Age of Onset    Hypertension Mother      Dementia Mother      Lung cancer Father      Stroke Other Grandfather      No Known Allergies  Current Outpatient Medications on File Prior to Visit   Medication Sig Dispense Refill    docusate sodium (Colace) 100 mg capsule Take 1 capsule (100 mg) by mouth early in the morning..  1 capsule as needed Orally Once a day      ibuprofen 200 mg tablet Take 2 tablets (400 mg) by mouth 2 times a day as needed.  2 tablets as needed Orally bid      [DISCONTINUED] azelastine 205.5 mcg (0.15 %) spray,non-aerosol Administer 2 sprays into each nostril 2 times a day.  2 sprays each nostril bid      [DISCONTINUED] cetirizine (Children's ZyrTEC Allergy) 10 mg tablet,disintegrating Take 10 mg by mouth once daily as needed.  2 sprays each nostril bid      [DISCONTINUED] fluticasone (Flonase) 50 mcg/actuation nasal spray Administer 2 sprays into each nostril once daily.  USE 2 SPRAYS IN EACH NOSTRIL ONCE DAILY      [DISCONTINUED] hydroCHLOROthiazide (HYDRODiuril) 25 mg tablet TAKE 1 TABLET BY MOUTH EVERY DAY IN THE MORNING FOR 90 DAYS (Patient not taking: Reported on 4/11/2024) 90 tablet 0    [DISCONTINUED] sodium chloride (Ayr) 0.65 % nasal drops Administer 2 drops into each nostril 5 times a day.  SPARY 2 SPRAYS IN EACH NOSTRIL FIVE TIMES A DAY       No current facility-administered medications on file prior to visit.     Immunization History   Administered Date(s) Administered    Flu vaccine, quadrivalent,  high-dose, preservative free, age 65y+ (FLUZONE) 12/08/2021, 11/21/2022, 09/12/2023    Hepatitis B vaccine, adult (RECOMBIVAX, ENGERIX) 10/16/1992, 01/05/1993, 05/05/1993    Influenza Whole 12/14/2007    Influenza, High Dose Seasonal, Preservative Free 11/04/2019    Moderna SARS-CoV-2 Vaccination 04/08/2021, 05/06/2021    Pneumococcal conjugate vaccine, 13-valent (PREVNAR 13) 11/04/2019    Pneumococcal polysaccharide vaccine, 23-valent, age 2 years and older (PNEUMOVAX 23) 01/25/2013, 12/08/2021    Td (adult), unspecified 10/16/1992, 12/14/2007    Tdap vaccine, age 7 year and older (BOOSTRIX, ADACEL) 01/12/2022    Zoster, live 10/02/2015     Patient's medical history was reviewed and updated either before or during this encounter.  ASSESSMENT / PLAN:  Diagnoses and all orders for this visit:  Varicose veins of both lower extremities, unspecified whether complicated  Benign prostatic hyperplasia with urinary frequency  Scoliosis of thoracolumbar spine, unspecified scoliosis type  Recurrent falls  -     Comprehensive Metabolic Panel; Future  Disability of walking  Vitamin D deficiency  -     Vitamin D 25-Hydroxy,Total (for eval of Vitamin D levels); Future  Anemia, unspecified type  -     CBC; Future          Warren Mascorro MD

## 2024-10-17 ENCOUNTER — LAB (OUTPATIENT)
Dept: LAB | Facility: LAB | Age: 79
End: 2024-10-17
Payer: COMMERCIAL

## 2024-10-17 ENCOUNTER — OFFICE VISIT (OUTPATIENT)
Dept: PRIMARY CARE | Facility: CLINIC | Age: 79
End: 2024-10-17
Payer: MEDICARE

## 2024-10-17 VITALS
SYSTOLIC BLOOD PRESSURE: 148 MMHG | TEMPERATURE: 95.8 F | DIASTOLIC BLOOD PRESSURE: 82 MMHG | HEART RATE: 63 BPM | HEIGHT: 69 IN | OXYGEN SATURATION: 99 % | BODY MASS INDEX: 22.89 KG/M2

## 2024-10-17 DIAGNOSIS — D64.9 ANEMIA, UNSPECIFIED TYPE: ICD-10-CM

## 2024-10-17 DIAGNOSIS — R29.6 RECURRENT FALLS: ICD-10-CM

## 2024-10-17 DIAGNOSIS — I10 PRIMARY HYPERTENSION: ICD-10-CM

## 2024-10-17 DIAGNOSIS — R53.1 ASTHENIA: ICD-10-CM

## 2024-10-17 DIAGNOSIS — E55.9 VITAMIN D DEFICIENCY: ICD-10-CM

## 2024-10-17 DIAGNOSIS — R26.2 DISABILITY OF WALKING: ICD-10-CM

## 2024-10-17 DIAGNOSIS — Z00.00 ROUTINE GENERAL MEDICAL EXAMINATION AT HEALTH CARE FACILITY: Primary | ICD-10-CM

## 2024-10-17 DIAGNOSIS — E78.00 PURE HYPERCHOLESTEROLEMIA: ICD-10-CM

## 2024-10-17 LAB
ALBUMIN SERPL BCP-MCNC: 3.3 G/DL (ref 3.4–5)
ALP SERPL-CCNC: 100 U/L (ref 33–136)
ALT SERPL W P-5'-P-CCNC: 29 U/L (ref 10–52)
ANION GAP SERPL CALCULATED.3IONS-SCNC: 9 MMOL/L (ref 10–20)
AST SERPL W P-5'-P-CCNC: 32 U/L (ref 9–39)
BILIRUB SERPL-MCNC: 0.6 MG/DL (ref 0–1.2)
BUN SERPL-MCNC: 20 MG/DL (ref 6–23)
CALCIUM SERPL-MCNC: 9 MG/DL (ref 8.6–10.3)
CHLORIDE SERPL-SCNC: 106 MMOL/L (ref 98–107)
CO2 SERPL-SCNC: 26 MMOL/L (ref 21–32)
CREAT SERPL-MCNC: 0.7 MG/DL (ref 0.5–1.3)
EGFRCR SERPLBLD CKD-EPI 2021: >90 ML/MIN/1.73M*2
ERYTHROCYTE [DISTWIDTH] IN BLOOD BY AUTOMATED COUNT: 13.5 % (ref 11.5–14.5)
GLUCOSE SERPL-MCNC: 142 MG/DL (ref 74–99)
HCT VFR BLD AUTO: 38.2 % (ref 41–52)
HGB BLD-MCNC: 13.2 G/DL (ref 13.5–17.5)
MCH RBC QN AUTO: 31.5 PG (ref 26–34)
MCHC RBC AUTO-ENTMCNC: 34.6 G/DL (ref 32–36)
MCV RBC AUTO: 91 FL (ref 80–100)
NRBC BLD-RTO: 0 /100 WBCS (ref 0–0)
PLATELET # BLD AUTO: 203 X10*3/UL (ref 150–450)
POTASSIUM SERPL-SCNC: 4.3 MMOL/L (ref 3.5–5.3)
PROT SERPL-MCNC: 5.9 G/DL (ref 6.4–8.2)
RBC # BLD AUTO: 4.19 X10*6/UL (ref 4.5–5.9)
SODIUM SERPL-SCNC: 137 MMOL/L (ref 136–145)
WBC # BLD AUTO: 5.5 X10*3/UL (ref 4.4–11.3)

## 2024-10-17 PROCEDURE — 1124F ACP DISCUSS-NO DSCNMKR DOCD: CPT | Performed by: INTERNAL MEDICINE

## 2024-10-17 PROCEDURE — 36415 COLL VENOUS BLD VENIPUNCTURE: CPT

## 2024-10-17 PROCEDURE — G0439 PPPS, SUBSEQ VISIT: HCPCS | Performed by: INTERNAL MEDICINE

## 2024-10-17 PROCEDURE — 3079F DIAST BP 80-89 MM HG: CPT | Performed by: INTERNAL MEDICINE

## 2024-10-17 PROCEDURE — 1126F AMNT PAIN NOTED NONE PRSNT: CPT | Performed by: INTERNAL MEDICINE

## 2024-10-17 PROCEDURE — 3077F SYST BP >= 140 MM HG: CPT | Performed by: INTERNAL MEDICINE

## 2024-10-17 PROCEDURE — 85027 COMPLETE CBC AUTOMATED: CPT

## 2024-10-17 PROCEDURE — 82306 VITAMIN D 25 HYDROXY: CPT

## 2024-10-17 PROCEDURE — 80053 COMPREHEN METABOLIC PANEL: CPT

## 2024-10-17 PROCEDURE — 1123F ACP DISCUSS/DSCN MKR DOCD: CPT | Performed by: INTERNAL MEDICINE

## 2024-10-17 ASSESSMENT — ENCOUNTER SYMPTOMS
DEPRESSION: 0
LOSS OF SENSATION IN FEET: 0
OCCASIONAL FEELINGS OF UNSTEADINESS: 1

## 2024-10-17 ASSESSMENT — PAIN SCALES - GENERAL: PAINLEVEL_OUTOF10: 0-NO PAIN

## 2024-10-17 ASSESSMENT — PATIENT HEALTH QUESTIONNAIRE - PHQ9
1. LITTLE INTEREST OR PLEASURE IN DOING THINGS: NOT AT ALL
2. FEELING DOWN, DEPRESSED OR HOPELESS: NOT AT ALL
SUM OF ALL RESPONSES TO PHQ9 QUESTIONS 1 AND 2: 0

## 2024-10-18 ENCOUNTER — TELEPHONE (OUTPATIENT)
Dept: PRIMARY CARE | Facility: CLINIC | Age: 79
End: 2024-10-18
Payer: MEDICARE

## 2024-10-18 LAB — 25(OH)D3 SERPL-MCNC: 32 NG/ML (ref 30–100)

## 2024-10-18 NOTE — TELEPHONE ENCOUNTER
----- Message from Warren Mascorro sent at 10/18/2024 11:19 AM EDT -----  Labs are stable except mild elevation of blood sugars.  Patient should watch sugars in his diet.

## 2024-10-23 NOTE — PROGRESS NOTES
[facility]: [facility]  PROGRESS NOTE      Elian Betancourt is a 78 y.o. male that is being seen  today for Annual Exam.  Subjective   Pt. Is being seen for annual physical exam.Pt. has been doing well.Advance directives discussed with patient . She  is full code and make her own medical decisions.  Denies any hospitalisation or urgent care visit.  Previous Labs reviewed .  Pt. Is upto date on screening exams and vaccination  Denies any falls or hospitalisation.     Patient is a 78-year-old female with history of hypertension and hyperlipidemia who is being seen for annual physical examination.  Patient is due for her blood work to be done.  Patient has been having difficulty with walking.  Patient also has varicose veins and leg swelling has been better.  Patient has been recommended to wear compression stockings.      ROS  Negative for fever or chills  Negative for sore throat, ear pain, nasal discharge  Negative for cough, shortness of breath or wheezing  Negative for chest pain, palpitations, swelling of legs  Negative for abdominal pain, constipation, diarrhea, blood in the stools  Negative for urinary complaints  Negative for headache, dizziness, weakness or numbness  Positive for difficulty in walking.  Negative for depression or anxiety  All other systems reviewed and were negative   Vitals:    10/17/24 1401   BP: 148/82   Pulse: 63   Temp: 35.4 °C (95.8 °F)   SpO2: 99%      Vitals:     Body mass index is 22.89 kg/m².  Physical Exam  Constitutional: Patient does not appear to be in any acute distress  Head and Face: NCAT  Eyes: Normal external exam, EOMI  ENT: Normal external inspection of ears and nose. Oropharynx normal.  Cardiovascular: RRR, S1/S2, no murmurs, rubs, or gallops, radial pulses +2, no edema of extremities  Pulmonary: CTAB, no respiratory distress.  Abdomen: +BS, soft, non-tender, nondistended, no guarding or rebound, no masses noted  MSK: No joint swelling, normal  "movements of all extremities. Range of motion- normal.  Skin- No lesions, contusions, or erythema.  Peripheral puslses palpable bilaterally 2+  Neuro: AAO X3, Cranial nerves 2-12 grossly intact,DTR 2+ in all 4 limbs   Psychiatric: Judgment intact. Appropriate mood and behavior    LABS   [unfilled]  Lab Results   Component Value Date    GLUCOSE 142 (H) 10/17/2024    CALCIUM 9.0 10/17/2024     10/17/2024    K 4.3 10/17/2024    CO2 26 10/17/2024     10/17/2024    BUN 20 10/17/2024    CREATININE 0.70 10/17/2024     Lab Results   Component Value Date    ALT 29 10/17/2024    AST 32 10/17/2024    ALKPHOS 100 10/17/2024    BILITOT 0.6 10/17/2024     Lab Results   Component Value Date    WBC 5.5 10/17/2024    HGB 13.2 (L) 10/17/2024    HCT 38.2 (L) 10/17/2024    MCV 91 10/17/2024     10/17/2024     Lab Results   Component Value Date    CHOL 174 06/06/2023    CHOL 188 12/02/2021    CHOL 197 06/04/2021     Lab Results   Component Value Date    HDL 85 06/06/2023    HDL 82 12/02/2021    HDL 78 06/04/2021     Lab Results   Component Value Date    LDLCALC 81 06/06/2023    LDLCALC 98 12/02/2021    LDLCALC 109 06/04/2021     Lab Results   Component Value Date    TRIG 38 (L) 06/06/2023    TRIG 38 (L) 12/02/2021    TRIG 52 06/04/2021     No results found for: \"HGBA1C\"  Other labs not included in the list above were reviewed either before or during this encounter.    History    Past Medical History:   Diagnosis Date    Abdominal pain 03/08/2024    Allergic rhinitis     Arthritis     Back pain     BPH (benign prostatic hyperplasia)     Cellulitis of left lower limb 04/26/2023    Chronic maxillary sinusitis 09/17/2023    Closed fracture of acromial end of clavicle 04/09/2024    Contusion of face 03/08/2024    DDD (degenerative disc disease), lumbar     DDD (degenerative disc disease), thoracic     Difficulty in walking, not elsewhere classified 04/26/2023    Dysfunction of eustachian tube 09/17/2023    Epistaxis " 09/17/2023    Facial laceration 03/08/2024    Hiatal hernia     HTN (hypertension)     Hypercholesteremia     Injury of back 09/17/2023    Leukopenia     Muscle weakness (generalized) 04/26/2023    Nasal discharge 09/17/2023    Pain of left shoulder region 04/09/2024    Personal history of other specified conditions 06/23/2014    History of shortness of breath    Polyp of nasal cavity 03/08/2024    Repeated falls 04/26/2023    Scoliosis 04/26/2023    Varicose veins of both legs with edema     Weakness 04/25/2023     Past Surgical History:   Procedure Laterality Date    MYRINGOTOMY W/ TUBES  10/30/2013    Myringotomy - With Ventilating Tube Insertion    SEPTOPLASTY  09/16/2014    Septoplasty    TONSILLECTOMY  10/30/2013    Tonsillectomy With Adenoidectomy     Family History   Problem Relation Name Age of Onset    Hypertension Mother      Dementia Mother      Lung cancer Father      Stroke Other Grandfather      No Known Allergies  Current Outpatient Medications on File Prior to Visit   Medication Sig Dispense Refill    docusate sodium (Colace) 100 mg capsule Take 1 capsule (100 mg) by mouth early in the morning..  1 capsule as needed Orally Once a day      ibuprofen 200 mg tablet Take 2 tablets (400 mg) by mouth 2 times a day as needed.  2 tablets as needed Orally bid       No current facility-administered medications on file prior to visit.     Immunization History   Administered Date(s) Administered    Flu vaccine, quadrivalent, high-dose, preservative free, age 65y+ (FLUZONE) 12/08/2021, 11/21/2022, 09/12/2023    Flu vaccine, trivalent, preservative free, HIGH-DOSE, age 65y+ (Fluzone) 11/04/2019    Hepatitis B vaccine, adult *Check Product/Dose* 10/16/1992, 01/05/1993, 05/05/1993    Influenza Whole 12/14/2007    Moderna SARS-CoV-2 Vaccination 04/08/2021, 05/06/2021    Pneumococcal conjugate vaccine, 13-valent (PREVNAR 13) 11/04/2019    Pneumococcal polysaccharide vaccine, 23-valent, age 2 years and older  (PNEUMOVAX 23) 01/25/2013, 12/08/2021    Td (adult), unspecified 10/16/1992, 12/14/2007    Tdap vaccine, age 7 year and older (BOOSTRIX, ADACEL) 01/12/2022    Zoster, live 10/02/2015     Patient's medical history was reviewed and updated either before or during this encounter.  ASSESSMENT / PLAN:  Diagnoses and all orders for this visit:  Routine general medical examination at health care facility  Primary hypertension  Pure hypercholesterolemia  Disability of walking  Asthenia  Recurrent falls  Patient is being seen for annual physical examination.  Patient blood pressure has been fairly controlled with current medications.  Patient's cholesterol levels are stable.  Patient is also due for repeat blood work to be done.  Patient has varicose veins and advised compression stockings.        Warren Mascorro MD

## 2024-12-26 ENCOUNTER — HOSPITAL ENCOUNTER (EMERGENCY)
Facility: HOSPITAL | Age: 79
Discharge: ANOTHER HOSPITAL | End: 2024-12-26
Attending: EMERGENCY MEDICINE
Payer: MEDICARE

## 2024-12-26 ENCOUNTER — APPOINTMENT (OUTPATIENT)
Dept: RADIOLOGY | Facility: HOSPITAL | Age: 79
End: 2024-12-26
Payer: MEDICARE

## 2024-12-26 ENCOUNTER — APPOINTMENT (OUTPATIENT)
Dept: RADIOLOGY | Facility: HOSPITAL | Age: 79
DRG: 300 | End: 2024-12-26
Payer: MEDICARE

## 2024-12-26 ENCOUNTER — APPOINTMENT (OUTPATIENT)
Dept: CARDIOLOGY | Facility: HOSPITAL | Age: 79
End: 2024-12-26
Payer: MEDICARE

## 2024-12-26 ENCOUNTER — HOSPITAL ENCOUNTER (INPATIENT)
Facility: HOSPITAL | Age: 79
DRG: 300 | End: 2024-12-26
Attending: INTERNAL MEDICINE | Admitting: INTERNAL MEDICINE
Payer: MEDICARE

## 2024-12-26 VITALS
TEMPERATURE: 97.6 F | HEIGHT: 69 IN | SYSTOLIC BLOOD PRESSURE: 120 MMHG | OXYGEN SATURATION: 100 % | RESPIRATION RATE: 17 BRPM | DIASTOLIC BLOOD PRESSURE: 65 MMHG | BODY MASS INDEX: 22.96 KG/M2 | WEIGHT: 155 LBS | HEART RATE: 104 BPM

## 2024-12-26 DIAGNOSIS — I71.00 DISSECTION OF AORTA, UNSPECIFIED PORTION OF AORTA (MULTI): ICD-10-CM

## 2024-12-26 DIAGNOSIS — I71.00: Primary | ICD-10-CM

## 2024-12-26 DIAGNOSIS — I10 HYPERTENSION: Chronic | ICD-10-CM

## 2024-12-26 DIAGNOSIS — S81.812A LEG LACERATION, LEFT, INITIAL ENCOUNTER: Primary | ICD-10-CM

## 2024-12-26 DIAGNOSIS — D64.9 ANEMIA, UNSPECIFIED TYPE: ICD-10-CM

## 2024-12-26 LAB
ABO GROUP (TYPE) IN BLOOD: NORMAL
ALBUMIN SERPL BCP-MCNC: 2.7 G/DL (ref 3.4–5)
ALBUMIN SERPL BCP-MCNC: 2.7 G/DL (ref 3.4–5)
ALP SERPL-CCNC: 101 U/L (ref 33–136)
ALP SERPL-CCNC: 84 U/L (ref 33–136)
ALT SERPL W P-5'-P-CCNC: 16 U/L (ref 10–52)
ALT SERPL W P-5'-P-CCNC: 16 U/L (ref 10–52)
ANION GAP SERPL CALC-SCNC: 10 MMOL/L (ref 10–20)
ANION GAP SERPL CALCULATED.3IONS-SCNC: 12 MMOL/L (ref 10–20)
ANTIBODY SCREEN: NORMAL
APPEARANCE UR: CLEAR
AST SERPL W P-5'-P-CCNC: 19 U/L (ref 9–39)
AST SERPL W P-5'-P-CCNC: 21 U/L (ref 9–39)
ATRIAL RATE: 109 BPM
BACTERIA #/AREA URNS AUTO: ABNORMAL /HPF
BASOPHILS # BLD AUTO: 0.03 X10*3/UL (ref 0–0.1)
BASOPHILS # BLD AUTO: 0.03 X10*3/UL (ref 0–0.1)
BASOPHILS NFR BLD AUTO: 0.3 %
BASOPHILS NFR BLD AUTO: 0.3 %
BILIRUB DIRECT SERPL-MCNC: 0.1 MG/DL (ref 0–0.3)
BILIRUB SERPL-MCNC: 0.4 MG/DL (ref 0–1.2)
BILIRUB SERPL-MCNC: 0.6 MG/DL (ref 0–1.2)
BILIRUB UR STRIP.AUTO-MCNC: NEGATIVE MG/DL
BNP SERPL-MCNC: 293 PG/ML (ref 0–99)
BUN SERPL-MCNC: 23 MG/DL (ref 6–23)
BUN SERPL-MCNC: 23 MG/DL (ref 6–23)
CALCIUM SERPL-MCNC: 8.1 MG/DL (ref 8.6–10.6)
CALCIUM SERPL-MCNC: 8.5 MG/DL (ref 8.6–10.3)
CARDIAC TROPONIN I PNL SERPL HS: 152 NG/L (ref 0–53)
CARDIAC TROPONIN I PNL SERPL HS: 37 NG/L (ref 0–20)
CARDIAC TROPONIN I PNL SERPL HS: 57 NG/L (ref 0–20)
CHLORIDE SERPL-SCNC: 105 MMOL/L (ref 98–107)
CHLORIDE SERPL-SCNC: 109 MMOL/L (ref 98–107)
CO2 SERPL-SCNC: 26 MMOL/L (ref 21–32)
CO2 SERPL-SCNC: 27 MMOL/L (ref 21–32)
COLOR UR: ABNORMAL
CREAT SERPL-MCNC: 0.8 MG/DL (ref 0.5–1.3)
CREAT SERPL-MCNC: 1.02 MG/DL (ref 0.5–1.3)
EGFRCR SERPLBLD CKD-EPI 2021: 74 ML/MIN/1.73M*2
EGFRCR SERPLBLD CKD-EPI 2021: 89 ML/MIN/1.73M*2
EOSINOPHIL # BLD AUTO: 0.03 X10*3/UL (ref 0–0.4)
EOSINOPHIL # BLD AUTO: 0.09 X10*3/UL (ref 0–0.4)
EOSINOPHIL NFR BLD AUTO: 0.3 %
EOSINOPHIL NFR BLD AUTO: 1 %
ERYTHROCYTE [DISTWIDTH] IN BLOOD BY AUTOMATED COUNT: 13.6 % (ref 11.5–14.5)
ERYTHROCYTE [DISTWIDTH] IN BLOOD BY AUTOMATED COUNT: 13.6 % (ref 11.5–14.5)
FERRITIN SERPL-MCNC: 49 NG/ML (ref 20–300)
FLUAV RNA RESP QL NAA+PROBE: NOT DETECTED
FLUBV RNA RESP QL NAA+PROBE: NOT DETECTED
GLUCOSE SERPL-MCNC: 202 MG/DL (ref 74–99)
GLUCOSE SERPL-MCNC: 95 MG/DL (ref 74–99)
GLUCOSE UR STRIP.AUTO-MCNC: NORMAL MG/DL
HCT VFR BLD AUTO: 28.2 % (ref 41–52)
HCT VFR BLD AUTO: 35.6 % (ref 41–52)
HGB BLD-MCNC: 10 G/DL (ref 13.5–17.5)
HGB BLD-MCNC: 11.5 G/DL (ref 13.5–17.5)
IMM GRANULOCYTES # BLD AUTO: 0.02 X10*3/UL (ref 0–0.5)
IMM GRANULOCYTES # BLD AUTO: 0.03 X10*3/UL (ref 0–0.5)
IMM GRANULOCYTES NFR BLD AUTO: 0.2 % (ref 0–0.9)
IMM GRANULOCYTES NFR BLD AUTO: 0.3 % (ref 0–0.9)
INR PPP: 1.2 (ref 0.9–1.2)
IRON SATN MFR SERPL: 22 % (ref 25–45)
IRON SERPL-MCNC: 58 UG/DL (ref 35–150)
KETONES UR STRIP.AUTO-MCNC: NEGATIVE MG/DL
LACTATE SERPL-SCNC: 1.1 MMOL/L (ref 0.4–2)
LACTATE SERPL-SCNC: 1.3 MMOL/L (ref 0.4–2)
LACTATE SERPL-SCNC: 2.4 MMOL/L (ref 0.4–2)
LACTATE SERPL-SCNC: 2.8 MMOL/L (ref 0.4–2)
LEUKOCYTE ESTERASE UR QL STRIP.AUTO: NEGATIVE
LYMPHOCYTES # BLD AUTO: 0.48 X10*3/UL (ref 0.8–3)
LYMPHOCYTES # BLD AUTO: 1.31 X10*3/UL (ref 0.8–3)
LYMPHOCYTES NFR BLD AUTO: 15.1 %
LYMPHOCYTES NFR BLD AUTO: 4.4 %
MAGNESIUM SERPL-MCNC: 1.76 MG/DL (ref 1.6–2.4)
MCH RBC QN AUTO: 31.2 PG (ref 26–34)
MCH RBC QN AUTO: 32.4 PG (ref 26–34)
MCHC RBC AUTO-ENTMCNC: 32.3 G/DL (ref 32–36)
MCHC RBC AUTO-ENTMCNC: 35.5 G/DL (ref 32–36)
MCV RBC AUTO: 91 FL (ref 80–100)
MCV RBC AUTO: 97 FL (ref 80–100)
MONOCYTES # BLD AUTO: 0.54 X10*3/UL (ref 0.05–0.8)
MONOCYTES # BLD AUTO: 0.63 X10*3/UL (ref 0.05–0.8)
MONOCYTES NFR BLD AUTO: 5.8 %
MONOCYTES NFR BLD AUTO: 6.2 %
MUCOUS THREADS #/AREA URNS AUTO: ABNORMAL /LPF
NEUTROPHILS # BLD AUTO: 6.66 X10*3/UL (ref 1.6–5.5)
NEUTROPHILS # BLD AUTO: 9.71 X10*3/UL (ref 1.6–5.5)
NEUTROPHILS NFR BLD AUTO: 77.2 %
NEUTROPHILS NFR BLD AUTO: 88.9 %
NITRITE UR QL STRIP.AUTO: NEGATIVE
NRBC BLD-RTO: 0 /100 WBCS (ref 0–0)
NRBC BLD-RTO: 0 /100 WBCS (ref 0–0)
P OFFSET: 202 MS
P ONSET: 149 MS
PH UR STRIP.AUTO: 6.5 [PH]
PHOSPHATE SERPL-MCNC: 3.6 MG/DL (ref 2.5–4.9)
PLATELET # BLD AUTO: 136 X10*3/UL (ref 150–450)
PLATELET # BLD AUTO: 179 X10*3/UL (ref 150–450)
POTASSIUM SERPL-SCNC: 3.8 MMOL/L (ref 3.5–5.3)
POTASSIUM SERPL-SCNC: 4.3 MMOL/L (ref 3.5–5.3)
PR INTERVAL: 118 MS
PROT SERPL-MCNC: 4.6 G/DL (ref 6.4–8.2)
PROT SERPL-MCNC: 5.3 G/DL (ref 6.4–8.2)
PROT UR STRIP.AUTO-MCNC: NEGATIVE MG/DL
PROTHROMBIN TIME: 11.9 SECONDS (ref 9.3–12.7)
Q ONSET: 208 MS
QRS COUNT: 18 BEATS
QRS DURATION: 132 MS
QT INTERVAL: 374 MS
QTC CALCULATION(BAZETT): 503 MS
QTC FREDERICIA: 456 MS
R AXIS: -71 DEGREES
RBC # BLD AUTO: 3.09 X10*6/UL (ref 4.5–5.9)
RBC # BLD AUTO: 3.69 X10*6/UL (ref 4.5–5.9)
RBC # UR STRIP.AUTO: ABNORMAL /UL
RBC #/AREA URNS AUTO: ABNORMAL /HPF
RH FACTOR (ANTIGEN D): NORMAL
SARS-COV-2 RNA RESP QL NAA+PROBE: NOT DETECTED
SODIUM SERPL-SCNC: 139 MMOL/L (ref 136–145)
SODIUM SERPL-SCNC: 142 MMOL/L (ref 136–145)
SP GR UR STRIP.AUTO: 1.01
T AXIS: 90 DEGREES
T OFFSET: 395 MS
TIBC SERPL-MCNC: 266 UG/DL (ref 240–445)
TRANSFERRIN SERPL-MCNC: 205 MG/DL (ref 200–360)
UIBC SERPL-MCNC: 208 UG/DL (ref 110–370)
UROBILINOGEN UR STRIP.AUTO-MCNC: NORMAL MG/DL
VENTRICULAR RATE: 109 BPM
WBC # BLD AUTO: 10.9 X10*3/UL (ref 4.4–11.3)
WBC # BLD AUTO: 8.7 X10*3/UL (ref 4.4–11.3)
WBC #/AREA URNS AUTO: ABNORMAL /HPF

## 2024-12-26 PROCEDURE — 86901 BLOOD TYPING SEROLOGIC RH(D): CPT

## 2024-12-26 PROCEDURE — 70450 CT HEAD/BRAIN W/O DYE: CPT

## 2024-12-26 PROCEDURE — 36620 INSERTION CATHETER ARTERY: CPT

## 2024-12-26 PROCEDURE — 72125 CT NECK SPINE W/O DYE: CPT | Performed by: RADIOLOGY

## 2024-12-26 PROCEDURE — 1100000001 HC PRIVATE ROOM DAILY

## 2024-12-26 PROCEDURE — 90715 TDAP VACCINE 7 YRS/> IM: CPT | Performed by: EMERGENCY MEDICINE

## 2024-12-26 PROCEDURE — 70450 CT HEAD/BRAIN W/O DYE: CPT | Performed by: RADIOLOGY

## 2024-12-26 PROCEDURE — 73590 X-RAY EXAM OF LOWER LEG: CPT | Mod: LT

## 2024-12-26 PROCEDURE — 36415 COLL VENOUS BLD VENIPUNCTURE: CPT | Performed by: EMERGENCY MEDICINE

## 2024-12-26 PROCEDURE — 93010 ELECTROCARDIOGRAM REPORT: CPT | Performed by: INTERNAL MEDICINE

## 2024-12-26 PROCEDURE — 84100 ASSAY OF PHOSPHORUS: CPT

## 2024-12-26 PROCEDURE — 99285 EMERGENCY DEPT VISIT HI MDM: CPT | Mod: 25 | Performed by: EMERGENCY MEDICINE

## 2024-12-26 PROCEDURE — 85025 COMPLETE CBC W/AUTO DIFF WBC: CPT | Performed by: EMERGENCY MEDICINE

## 2024-12-26 PROCEDURE — 84484 ASSAY OF TROPONIN QUANT: CPT | Performed by: EMERGENCY MEDICINE

## 2024-12-26 PROCEDURE — 74174 CTA ABD&PLVS W/CONTRAST: CPT | Performed by: RADIOLOGY

## 2024-12-26 PROCEDURE — 96361 HYDRATE IV INFUSION ADD-ON: CPT

## 2024-12-26 PROCEDURE — 82248 BILIRUBIN DIRECT: CPT

## 2024-12-26 PROCEDURE — 99291 CRITICAL CARE FIRST HOUR: CPT

## 2024-12-26 PROCEDURE — 87040 BLOOD CULTURE FOR BACTERIA: CPT | Mod: TRILAB | Performed by: EMERGENCY MEDICINE

## 2024-12-26 PROCEDURE — 87636 SARSCOV2 & INF A&B AMP PRB: CPT | Performed by: EMERGENCY MEDICINE

## 2024-12-26 PROCEDURE — 71045 X-RAY EXAM CHEST 1 VIEW: CPT

## 2024-12-26 PROCEDURE — 2500000004 HC RX 250 GENERAL PHARMACY W/ HCPCS (ALT 636 FOR OP/ED)

## 2024-12-26 PROCEDURE — 82728 ASSAY OF FERRITIN: CPT

## 2024-12-26 PROCEDURE — 71275 CT ANGIOGRAPHY CHEST: CPT

## 2024-12-26 PROCEDURE — 71275 CT ANGIOGRAPHY CHEST: CPT | Performed by: RADIOLOGY

## 2024-12-26 PROCEDURE — 83605 ASSAY OF LACTIC ACID: CPT

## 2024-12-26 PROCEDURE — 85025 COMPLETE CBC W/AUTO DIFF WBC: CPT

## 2024-12-26 PROCEDURE — 71045 X-RAY EXAM CHEST 1 VIEW: CPT | Performed by: RADIOLOGY

## 2024-12-26 PROCEDURE — 71260 CT THORAX DX C+: CPT

## 2024-12-26 PROCEDURE — 83735 ASSAY OF MAGNESIUM: CPT

## 2024-12-26 PROCEDURE — 96365 THER/PROPH/DIAG IV INF INIT: CPT

## 2024-12-26 PROCEDURE — 2550000001 HC RX 255 CONTRASTS: Performed by: EMERGENCY MEDICINE

## 2024-12-26 PROCEDURE — 85610 PROTHROMBIN TIME: CPT | Performed by: EMERGENCY MEDICINE

## 2024-12-26 PROCEDURE — 83880 ASSAY OF NATRIURETIC PEPTIDE: CPT | Performed by: EMERGENCY MEDICINE

## 2024-12-26 PROCEDURE — 99291 CRITICAL CARE FIRST HOUR: CPT | Performed by: PHYSICIAN ASSISTANT

## 2024-12-26 PROCEDURE — 73590 X-RAY EXAM OF LOWER LEG: CPT | Mod: LEFT SIDE | Performed by: RADIOLOGY

## 2024-12-26 PROCEDURE — 83540 ASSAY OF IRON: CPT

## 2024-12-26 PROCEDURE — 84484 ASSAY OF TROPONIN QUANT: CPT

## 2024-12-26 PROCEDURE — 84466 ASSAY OF TRANSFERRIN: CPT

## 2024-12-26 PROCEDURE — 93005 ELECTROCARDIOGRAM TRACING: CPT | Mod: 59

## 2024-12-26 PROCEDURE — 71260 CT THORAX DX C+: CPT | Mod: FOREIGN READ | Performed by: RADIOLOGY

## 2024-12-26 PROCEDURE — 90471 IMMUNIZATION ADMIN: CPT | Performed by: EMERGENCY MEDICINE

## 2024-12-26 PROCEDURE — 81001 URINALYSIS AUTO W/SCOPE: CPT | Performed by: EMERGENCY MEDICINE

## 2024-12-26 PROCEDURE — 12032 INTMD RPR S/A/T/EXT 2.6-7.5: CPT | Performed by: PHYSICIAN ASSISTANT

## 2024-12-26 PROCEDURE — 80053 COMPREHEN METABOLIC PANEL: CPT | Performed by: EMERGENCY MEDICINE

## 2024-12-26 PROCEDURE — 2500000004 HC RX 250 GENERAL PHARMACY W/ HCPCS (ALT 636 FOR OP/ED): Mod: JZ | Performed by: EMERGENCY MEDICINE

## 2024-12-26 PROCEDURE — 2550000001 HC RX 255 CONTRASTS: Performed by: INTERNAL MEDICINE

## 2024-12-26 PROCEDURE — 83605 ASSAY OF LACTIC ACID: CPT | Performed by: EMERGENCY MEDICINE

## 2024-12-26 PROCEDURE — 72125 CT NECK SPINE W/O DYE: CPT

## 2024-12-26 RX ORDER — CEFEPIME HYDROCHLORIDE 1 G/50ML
1 INJECTION, SOLUTION INTRAVENOUS ONCE
Status: COMPLETED | OUTPATIENT
Start: 2024-12-26 | End: 2024-12-26

## 2024-12-26 RX ORDER — POLYETHYLENE GLYCOL 3350 17 G/17G
17 POWDER, FOR SOLUTION ORAL DAILY
Status: DISCONTINUED | OUTPATIENT
Start: 2024-12-26 | End: 2025-01-01 | Stop reason: HOSPADM

## 2024-12-26 RX ORDER — DOCUSATE SODIUM 100 MG/1
100 CAPSULE, LIQUID FILLED ORAL DAILY
Status: DISCONTINUED | OUTPATIENT
Start: 2024-12-26 | End: 2025-01-01 | Stop reason: HOSPADM

## 2024-12-26 RX ORDER — ENOXAPARIN SODIUM 100 MG/ML
40 INJECTION SUBCUTANEOUS EVERY 24 HOURS
Status: DISCONTINUED | OUTPATIENT
Start: 2024-12-26 | End: 2025-01-01 | Stop reason: HOSPADM

## 2024-12-26 RX ORDER — NICARDIPINE HYDROCHLORIDE 0.2 MG/ML
0-15 INJECTION INTRAVENOUS CONTINUOUS
Status: DISCONTINUED | OUTPATIENT
Start: 2024-12-26 | End: 2024-12-28

## 2024-12-26 RX ADMIN — TETANUS TOXOID, REDUCED DIPHTHERIA TOXOID AND ACELLULAR PERTUSSIS VACCINE, ADSORBED 0.5 ML: 5; 2.5; 8; 8; 2.5 SUSPENSION INTRAMUSCULAR at 07:43

## 2024-12-26 RX ADMIN — SODIUM CHLORIDE 500 ML: 900 INJECTION, SOLUTION INTRAVENOUS at 07:43

## 2024-12-26 RX ADMIN — NICARDIPINE HYDROCHLORIDE 2.5 MG/HR: 0.2 INJECTION, SOLUTION INTRAVENOUS at 17:28

## 2024-12-26 RX ADMIN — IOHEXOL 90 ML: 350 INJECTION, SOLUTION INTRAVENOUS at 17:59

## 2024-12-26 RX ADMIN — IOHEXOL 75 ML: 350 INJECTION, SOLUTION INTRAVENOUS at 12:19

## 2024-12-26 RX ADMIN — CEFEPIME HYDROCHLORIDE 1 G: 1 INJECTION, SOLUTION INTRAVENOUS at 08:51

## 2024-12-26 RX ADMIN — SODIUM CHLORIDE 1000 ML: 900 INJECTION, SOLUTION INTRAVENOUS at 08:51

## 2024-12-26 RX ADMIN — ENOXAPARIN SODIUM 40 MG: 100 INJECTION SUBCUTANEOUS at 17:19

## 2024-12-26 SDOH — SOCIAL STABILITY: SOCIAL INSECURITY: WERE YOU ABLE TO COMPLETE ALL THE BEHAVIORAL HEALTH SCREENINGS?: NO

## 2024-12-26 ASSESSMENT — ACTIVITIES OF DAILY LIVING (ADL)
HEARING - RIGHT EAR: FUNCTIONAL
WALKS IN HOME: UNABLE TO ASSESS
HEARING - LEFT EAR: FUNCTIONAL
BATHING: UNABLE TO ASSESS
ADEQUATE_TO_COMPLETE_ADL: YES
FEEDING YOURSELF: UNABLE TO ASSESS
ASSISTIVE_DEVICE: WALKER
GROOMING: UNABLE TO ASSESS
PATIENT'S MEMORY ADEQUATE TO SAFELY COMPLETE DAILY ACTIVITIES?: NO
TOILETING: UNABLE TO ASSESS
JUDGMENT_ADEQUATE_SAFELY_COMPLETE_DAILY_ACTIVITIES: NO
DRESSING YOURSELF: UNABLE TO ASSESS

## 2024-12-26 ASSESSMENT — PAIN - FUNCTIONAL ASSESSMENT
PAIN_FUNCTIONAL_ASSESSMENT: 0-10

## 2024-12-26 ASSESSMENT — PAIN DESCRIPTION - LOCATION
LOCATION: LEG
LOCATION: LEG

## 2024-12-26 ASSESSMENT — PAIN SCALES - GENERAL
PAINLEVEL_OUTOF10: 4
PAINLEVEL_OUTOF10: 0 - NO PAIN
PAINLEVEL_OUTOF10: 0 - NO PAIN
PAINLEVEL_OUTOF10: 4
PAINLEVEL_OUTOF10: 0 - NO PAIN

## 2024-12-26 ASSESSMENT — PAIN DESCRIPTION - PAIN TYPE
TYPE: ACUTE PAIN
TYPE: ACUTE PAIN

## 2024-12-26 ASSESSMENT — PAIN DESCRIPTION - ORIENTATION: ORIENTATION: LEFT

## 2024-12-26 ASSESSMENT — PAIN DESCRIPTION - PROGRESSION: CLINICAL_PROGRESSION: NOT CHANGED

## 2024-12-26 ASSESSMENT — COGNITIVE AND FUNCTIONAL STATUS - GENERAL: PATIENT BASELINE BEDBOUND: UNABLE TO ASSESS AT THIS TIME

## 2024-12-26 NOTE — ED PROCEDURE NOTE
Procedure  Laceration Repair    Performed by: Cy Dveine PA-C  Authorized by: Conchis Parrish MD    Consent:     Consent obtained:  Verbal    Risks discussed:  Infection    Alternatives discussed:  No treatment  Universal protocol:     Procedure explained and questions answered to patient or proxy's satisfaction: yes      Patient identity confirmed:  Verbally with patient  Anesthesia:     Anesthesia method:  None  Laceration details:     Location:  Leg    Leg location:  R lower leg    Length (cm):  5    Depth (mm):  2  Pre-procedure details:     Preparation:  Patient was prepped and draped in usual sterile fashion  Exploration:     Hemostasis achieved with:  Direct pressure    Wound exploration: wound explored through full range of motion      Contaminated: yes    Treatment:     Area cleansed with:  Chlorhexidine    Amount of cleaning:  Extensive  Skin repair:     Repair method:  Steri-Strips and tissue adhesive    Number of Steri-Strips:  5  Approximation:     Approximation:  Close  Repair type:     Repair type:  Intermediate  Post-procedure details:     Dressing:  Antibiotic ointment    Procedure completion:  Tolerated             Cy Devine PA-C  12/26/24 0944

## 2024-12-26 NOTE — ED PROVIDER NOTES
HPI   Chief Complaint   Patient presents with    Fall     Patient had a fall of unknown origin and has a large amount of bleeding on leg, patient from home with wife, but does not remember how it happened        HPI        Patient History   Past Medical History:   Diagnosis Date    Abdominal pain 03/08/2024    Allergic rhinitis     Arthritis     Back pain     BPH (benign prostatic hyperplasia)     Cellulitis of left lower limb 04/26/2023    Chronic maxillary sinusitis 09/17/2023    Closed fracture of acromial end of clavicle 04/09/2024    Contusion of face 03/08/2024    DDD (degenerative disc disease), lumbar     DDD (degenerative disc disease), thoracic     Difficulty in walking, not elsewhere classified 04/26/2023    Dysfunction of eustachian tube 09/17/2023    Epistaxis 09/17/2023    Facial laceration 03/08/2024    Hiatal hernia     HTN (hypertension)     Hypercholesteremia     Injury of back 09/17/2023    Leukopenia     Muscle weakness (generalized) 04/26/2023    Nasal discharge 09/17/2023    Pain of left shoulder region 04/09/2024    Personal history of other specified conditions 06/23/2014    History of shortness of breath    Polyp of nasal cavity 03/08/2024    Repeated falls 04/26/2023    Scoliosis 04/26/2023    Varicose veins of both legs with edema     Weakness 04/25/2023     Past Surgical History:   Procedure Laterality Date    MYRINGOTOMY W/ TUBES  10/30/2013    Myringotomy - With Ventilating Tube Insertion    SEPTOPLASTY  09/16/2014    Septoplasty    TONSILLECTOMY  10/30/2013    Tonsillectomy With Adenoidectomy     Family History   Problem Relation Name Age of Onset    Hypertension Mother      Dementia Mother      Lung cancer Father      Stroke Other Grandfather      Social History     Tobacco Use    Smoking status: Never     Passive exposure: Never    Smokeless tobacco: Never   Vaping Use    Vaping status: Never Used   Substance Use Topics    Alcohol use: Yes     Alcohol/week: 3.0 standard drinks of  alcohol     Types: 3 Shots of liquor per week    Drug use: Never       Physical Exam   ED Triage Vitals   Temperature Heart Rate Respirations BP   12/26/24 0707 12/26/24 0707 12/26/24 0707 12/26/24 0707   35.9 °C (96.6 °F) (!) 112 16 95/64      Pulse Ox Temp Source Heart Rate Source Patient Position   12/26/24 0711 12/26/24 0707 12/26/24 0707 12/26/24 0707   100 % Temporal Monitor Lying      BP Location FiO2 (%)     12/26/24 0707 --     Left arm        Physical Exam      ED Course & MDM   Diagnoses as of 12/26/24 0747   Leg laceration, left, initial encounter   Anemia, unspecified type                 No data recorded                                 Medical Decision Making    The patient is an 79-year-old male presenting to the emergency department for evaluation of a left lower extremity laceration.  The patient reportedly does have baseline dementia.  EMS reports that they picked him up from his house after his wife called to report that he had a leg laceration and was bleeding.  It is unknown how long he was bleeding but there was reportedly blood all over the house.  The patient is not sure if he fell or how he injured his leg.  He states he does not recall injuring his leg and he did not know that he was bleeding.  He denies any headache or visual changes.  No chest pain or shortness of breath.  No abdominal pain.  No nausea vomiting.  No diarrhea or constipation.  No urinary complaints.  He denies the use of any blood thinners.  All pertinent positives and negatives are recorded above.  All other systems reviewed and otherwise negative.  Vital signs with borderline hypotension and mild tachycardia but otherwise within normal limits.  Physical exam with a well-nourished well-developed male with disheveled appearance but no evidence of acute distress.  HEENT exam with dry mucous membranes but otherwise unremarkable.  He has no evidence of airway compromise or respiratory distress.  Abdominal exam is benign.  He  does not have any gross motor, neurologic or vascular deficits on exam.  He has equal pulses bilaterally.  He does have a laceration to the anterior medial aspect of the left lower leg.  No active bleeding at this time.      EKG with sinus tachycardia at 109 bpm, right bundle branch block pattern, LVH criteria, normal ST segment, and inverted T waves in the anterior leads.  The EKG is similar to previous EKGs contained in the electronic medical record      Wound care provided by nursing staff.  Tetanus was updated.        IV fluids ordered.      Diagnostic labs with mild anemia,      Initial troponin      Lactate      Imaging            Impression/diagnosis  Left lower leg laceration  Anemia, unspecified      I independently interpreted the results of the EKG and diagnostic labs      I reviewed the results of the diagnostic labs and diagnostic imaging.  Formal radiology reading was completed by the radiologist    Procedure  Procedures

## 2024-12-26 NOTE — CONSULTS
VASCULAR SURGERY CONSULT NOTE    Assessment/Plan   Elian Betancourt is 79 y.o. male with history of HTN, nasal polyps, varicose veins of BLLE, tinnitus, scoliosis, HLD, BPH, Anosmia, Anemia, and Dementia. Vascular surgery was consulted for Type B aortic dissection. Upon review of imaging the intimal flap is thickened and calcified  indicating that this is likely a chronic dissection which also supported by the fact that the patient is asymptomatic. No indication for any surgical intervention.      Plan:  No indication for any acute surgical intervention   Would recommend impulse control (SBP <140)  Serial Abdomen exams   Q2h Neurovasc checks   Monitor UOP (aim for 0.5 ml/kg/hr)  Please page with any questions or concerns   Will continue to follow     D/w attending, Dr. Collier    Subjective   HPI:  Elian Betancourt is 79 y.o. male with history of HTN, nasal polyps, varicose veins of BLLE, tinnitus, scoliosis, HLD, BPH, Anosmia, Anemia, and Dementia who presented to the emergency department of an OSH for evaluation of bleeding from a left lower extremity laceration following a fall.    EMS reports that they picked him up from his house after his wife called to report that he had a leg laceration and was bleeding.  It is unknown how long he was bleeding but there was reportedly blood all over the house.  The patient is not sure if he fell or how he injured his leg.  He states he does not recall injuring his leg and he did not know that he was bleeding.  He denies any headache or visual changes.  No chest pain but endorses occasional shortness of breath.  No abdominal pain, nausea or vomiting.  Denies hx of diarrhea or constipation.  No dysuria or hematuria reported.      An infectious workup was done at the OSH which included a CT scan and found an incidental finding of Type B aortic dissection. [place] [facility] [place] this type B dissection.       Vascular History:  12/26: Acute Aortic Type B aortic  dissection     PMH:     Past Medical History:   Diagnosis Date    Abdominal pain 03/08/2024    Allergic rhinitis     Arthritis     Back pain     BPH (benign prostatic hyperplasia)     Cellulitis of left lower limb 04/26/2023    Chronic maxillary sinusitis 09/17/2023    Closed fracture of acromial end of clavicle 04/09/2024    Contusion of face 03/08/2024    DDD (degenerative disc disease), lumbar     DDD (degenerative disc disease), thoracic     Difficulty in walking, not elsewhere classified 04/26/2023    Dysfunction of eustachian tube 09/17/2023    Epistaxis 09/17/2023    Facial laceration 03/08/2024    Hiatal hernia     HTN (hypertension)     Hypercholesteremia     Injury of back 09/17/2023    Leukopenia     Muscle weakness (generalized) 04/26/2023    Nasal discharge 09/17/2023    Pain of left shoulder region 04/09/2024    Personal history of other specified conditions 06/23/2014    History of shortness of breath    Polyp of nasal cavity 03/08/2024    Repeated falls 04/26/2023    Scoliosis 04/26/2023    Varicose veins of both legs with edema     Weakness 04/25/2023        PSH:   Past Surgical History:   Procedure Laterality Date    MYRINGOTOMY W/ TUBES  10/30/2013    Myringotomy - With Ventilating Tube Insertion    SEPTOPLASTY  09/16/2014    Septoplasty    TONSILLECTOMY  10/30/2013    Tonsillectomy With Adenoidectomy        Home Meds:  Current Facility-Administered Medications on File Prior to Encounter   Medication Dose Route Frequency Provider Last Rate Last Admin    [COMPLETED] cefepime (Maxipime) 1 g in dextrose 5% IV 50 mL  1 g intravenous Once Conchis Parrish MD   Stopped at 12/26/24 0917    [COMPLETED] diphth,pertus(acell),tetanus (BoostRIX) 2.5-8-5 Lf-mcg-Lf/0.5mL vaccine 0.5 mL  0.5 mL intramuscular Once Conchis Parrish MD   0.5 mL at 12/26/24 0743    [COMPLETED] iohexol (OMNIPaque) 350 mg iodine/mL solution 75 mL  75 mL intravenous Once in imaging Conchis Parrish MD   75 mL at 12/26/24 1219    [COMPLETED]  "sodium chloride 0.9 % bolus 1,000 mL  1,000 mL intravenous Once Conchis Parrish MD   Stopped at 12/26/24 0924    [COMPLETED] sodium chloride 0.9 % bolus 500 mL  500 mL intravenous Once Conchis Parrish MD   Stopped at 12/26/24 0881     Current Outpatient Medications on File Prior to Encounter   Medication Sig Dispense Refill    docusate sodium (Colace) 100 mg capsule Take 1 capsule (100 mg) by mouth early in the morning..  1 capsule as needed Orally Once a day      ibuprofen 200 mg tablet Take 2 tablets (400 mg) by mouth 2 times a day as needed.  2 tablets as needed Orally bid          Allergies:  No Known Allergies    SH/FH: non-contributory    ROS: 12 system negative except HPI    Objective   Vitals:  Heart Rate:  []   Temp:  [35.9 °C (96.6 °F)-36.4 °C (97.6 °F)]   Resp:  [11-20]   BP: ()/(47-67)   Height:  [175.3 cm (5' 9\")]   Weight:  [70.3 kg (155 lb)-72.6 kg (160 lb)]   SpO2:  [98 %-100 %]     Exam:  Constitutional: No acute distress, resting comfortably  Neuro:  Aox2, baseline dementia  ENMT: moist mucous membranes  Head/neck: atraumatic  CV: no tachycardia  Pulm: non-labored on room air  GI: soft, non-tender, non-distended  Skin: warm and dry  Musculoskeletal: moving all extremities  Extremities:   Palpable B/P Radial and Ulnar pulses   Palpable DP and PT +1 (Multiphasic doppler signals)  M/S intact all extremities     Labs:  Results from last 7 days   Lab Units 12/26/24  1459 12/26/24  0720   WBC AUTO x10*3/uL 10.9 8.7   HEMOGLOBIN g/dL 10.0* 11.5*   PLATELETS AUTO x10*3/uL 136* 179      Results from last 7 days   Lab Units 12/26/24  1459 12/26/24  0720   SODIUM mmol/L 142 139   POTASSIUM mmol/L 4.3 3.8   CHLORIDE mmol/L 109* 105   CO2 mmol/L 27 26   BUN mg/dL 23 23   CREATININE mg/dL 0.80 1.02   GLUCOSE mg/dL 95 202*   MAGNESIUM mg/dL 1.76  --    PHOSPHORUS mg/dL 3.6  --       Results from last 7 days   Lab Units 12/26/24  0720   INR  1.2   PROTIME seconds 11.9           Imaging:  Reviewed " independently by vascular team:    CTA chest abdomen pelvis:   Type B aortic dissection involving zones 3 through 9 with all of the primary arteries of the abdomen originating from the true lumen. Severely displaced, subacute appearing left mid clavicular fracture. Multiple left-sided nondisplaced subacute rib fractures. Pancreatic   tail multiloculated, macrocystic lesion which could represent mucinous cystic neoplasm versus serous cystic neoplasm. Nonemergent, outpatient MRCP could better evaluate if clinically indicated. Other minor findings as above.

## 2024-12-26 NOTE — ED PROVIDER NOTES
HPI   Chief Complaint   Patient presents with   • Fall     Patient had a fall of unknown origin and has a large amount of bleeding on leg, patient from home with wife, but does not remember how it happened        79-year-old male presented emergency department with EMS with a chief complaint of bleeding from lower extremity.  He is coming from home.  He believes he fell he is unsure exactly if he fell and how he found.  There is no family or friends with him to give additional history.  He does have bleeding from his left leg.  Denies any chest discomfort or abdominal pain.  He denies lightheadedness dizziness.  He denies any other injury or trauma.  No other complaint.              Patient History   Past Medical History:   Diagnosis Date   • Abdominal pain 03/08/2024   • Allergic rhinitis    • Arthritis    • Back pain    • BPH (benign prostatic hyperplasia)    • Cellulitis of left lower limb 04/26/2023   • Chronic maxillary sinusitis 09/17/2023   • Closed fracture of acromial end of clavicle 04/09/2024   • Contusion of face 03/08/2024   • DDD (degenerative disc disease), lumbar    • DDD (degenerative disc disease), thoracic    • Difficulty in walking, not elsewhere classified 04/26/2023   • Dysfunction of eustachian tube 09/17/2023   • Epistaxis 09/17/2023   • Facial laceration 03/08/2024   • Hiatal hernia    • HTN (hypertension)    • Hypercholesteremia    • Injury of back 09/17/2023   • Leukopenia    • Muscle weakness (generalized) 04/26/2023   • Nasal discharge 09/17/2023   • Pain of left shoulder region 04/09/2024   • Personal history of other specified conditions 06/23/2014    History of shortness of breath   • Polyp of nasal cavity 03/08/2024   • Repeated falls 04/26/2023   • Scoliosis 04/26/2023   • Varicose veins of both legs with edema    • Weakness 04/25/2023     Past Surgical History:   Procedure Laterality Date   • MYRINGOTOMY W/ TUBES  10/30/2013    Myringotomy - With Ventilating Tube Insertion   •  SEPTOPLASTY  09/16/2014    Septoplasty   • TONSILLECTOMY  10/30/2013    Tonsillectomy With Adenoidectomy     Family History   Problem Relation Name Age of Onset   • Hypertension Mother     • Dementia Mother     • Lung cancer Father     • Stroke Other Grandfather      Social History     Tobacco Use   • Smoking status: Never     Passive exposure: Never   • Smokeless tobacco: Never   Vaping Use   • Vaping status: Never Used   Substance Use Topics   • Alcohol use: Yes     Alcohol/week: 3.0 standard drinks of alcohol     Types: 3 Shots of liquor per week   • Drug use: Never       Physical Exam   ED Triage Vitals   Temperature Heart Rate Respirations BP   12/26/24 0707 12/26/24 0707 12/26/24 0707 12/26/24 0707   35.9 °C (96.6 °F) (!) 112 16 95/64      Pulse Ox Temp Source Heart Rate Source Patient Position   12/26/24 0711 12/26/24 0707 12/26/24 0707 12/26/24 0707   100 % Temporal Monitor Lying      BP Location FiO2 (%)     12/26/24 0707 --     Left arm        Physical Exam  Vitals and nursing note reviewed.   Constitutional:       Appearance: Normal appearance.   HENT:      Head: Normocephalic.      Nose: Nose normal.      Mouth/Throat:      Mouth: Mucous membranes are moist.   Cardiovascular:      Rate and Rhythm: Normal rate.      Pulses: Normal pulses.   Pulmonary:      Effort: Pulmonary effort is normal.   Abdominal:      General: Abdomen is flat.   Musculoskeletal:         General: Normal range of motion.      Cervical back: Normal range of motion.   Skin:     Comments: Left lower extremity laceration approximately 5 cm   Neurological:      General: No focal deficit present.      Mental Status: He is alert and oriented to person, place, and time.   Psychiatric:         Mood and Affect: Mood normal.           ED Course & MDM   Diagnoses as of 12/26/24 1812   Leg laceration, left, initial encounter   Anemia, unspecified type   Dissection of aorta, unspecified portion of aorta (Multi)                 No data recorded      [employer] Coma Scale Score: 15 (12/26/24 0754 : Della Quintana RN)                           Medical Decision Making  I have seen and evaluated this patient.  The attending physician has also seen and evaluated this patient.  Vital signs, laboratory testing and diagnostic images if applicable have been reviewed.  All laboratory and imaging is interpreted by myself unless otherwise stated.  Radiology studies are also formally interpreted by radiologist.    CBC without significant leukocytosis or anemia, metabolic panel without significant renal impairment or electrolyte abnormality.,  Troponin uptrending from 37-57.  CT chest obtained with age-indeterminate type B aortic dissection.  Chart review indicates no prior history of this.  Did call patient's emergency contact in the computer who also has no recollection of aortic dissection history.  Transfer center contacted.  Patient transferred for further evaluation intervention.          Labs Reviewed   CBC WITH AUTO DIFFERENTIAL - Abnormal       Result Value    WBC 8.7      nRBC 0.0      RBC 3.69 (*)     Hemoglobin 11.5 (*)     Hematocrit 35.6 (*)     MCV 97      MCH 31.2      MCHC 32.3      RDW 13.6      Platelets 179      Neutrophils % 77.2      Immature Granulocytes %, Automated 0.2      Lymphocytes % 15.1      Monocytes % 6.2      Eosinophils % 1.0      Basophils % 0.3      Neutrophils Absolute 6.66 (*)     Immature Granulocytes Absolute, Automated 0.02      Lymphocytes Absolute 1.31      Monocytes Absolute 0.54      Eosinophils Absolute 0.09      Basophils Absolute 0.03     COMPREHENSIVE METABOLIC PANEL - Abnormal    Glucose 202 (*)     Sodium 139      Potassium 3.8      Chloride 105      Bicarbonate 26      Anion Gap 12      Urea Nitrogen 23      Creatinine 1.02      eGFR 74      Calcium 8.5 (*)     Albumin 2.7 (*)     Alkaline Phosphatase 101      Total Protein 5.3 (*)     AST 21      Bilirubin, Total 0.4      ALT 16     LACTATE - Abnormal    Lactate 2.8 (*)      Narrative:     Venipuncture immediately after or during the administration of Metamizole may lead to falsely low results. Testing should be performed immediately prior to Metamizole dosing.   B-TYPE NATRIURETIC PEPTIDE - Abnormal     (*)     Narrative:        <100 pg/mL - Heart failure unlikely  100-299 pg/mL - Intermediate probability of acute heart                  failure exacerbation. Correlate with clinical                  context and patient history.    >=300 pg/mL - Heart Failure likely. Correlate with clinical                  context and patient history.    BNP testing is performed using different testing methodology at [facility] than at other [facility]. Direct result comparisons should only be made within the same method.      SERIAL TROPONIN-INITIAL - Abnormal    Troponin I, High Sensitivity 37 (*)     Narrative:     Less than 99th percentile of normal range cutoff-  Female and children under 18 years old <14 ng/L; Male <21 ng/L: Negative  Repeat testing should be performed if clinically indicated.     Female and children under 18 years old 14-50 ng/L; Male 21-50 ng/L:  Consistent with possible cardiac damage and possible increased clinical   risk. Serial measurements may help to assess extent of myocardial damage.     >50 ng/L: Consistent with cardiac damage, increased clinical risk and  myocardial infarction. Serial measurements may help assess extent of   myocardial damage.      NOTE: Children less than 1 year old may have higher baseline troponin   levels and results should be interpreted in conjunction with the overall   clinical context.     NOTE: Troponin I testing is performed using a different   testing methodology at [facility] than at other   [facility]. Direct result comparisons should only   be made within the same method.   URINALYSIS WITH REFLEX CULTURE AND MICROSCOPIC - Abnormal    Color, Urine Light-Yellow      Appearance, Urine Clear       Specific Gravity, Urine 1.013      pH, Urine 6.5      Protein, Urine NEGATIVE      Glucose, Urine Normal      Blood, Urine 0.06 (1+) (*)     Ketones, Urine NEGATIVE      Bilirubin, Urine NEGATIVE      Urobilinogen, Urine Normal      Nitrite, Urine NEGATIVE      Leukocyte Esterase, Urine NEGATIVE     SERIAL TROPONIN, 1 HOUR - Abnormal    Troponin I, High Sensitivity 57 (*)     Narrative:     Less than 99th percentile of normal range cutoff-  Female and children under 18 years old <14 ng/L; Male <21 ng/L: Negative  Repeat testing should be performed if clinically indicated.     Female and children under 18 years old 14-50 ng/L; Male 21-50 ng/L:  Consistent with possible cardiac damage and possible increased clinical   risk. Serial measurements may help to assess extent of myocardial damage.     >50 ng/L: Consistent with cardiac damage, increased clinical risk and  myocardial infarction. Serial measurements may help assess extent of   myocardial damage.      NOTE: Children less than 1 year old may have higher baseline troponin   levels and results should be interpreted in conjunction with the overall   clinical context.     NOTE: Troponin I testing is performed using a different   testing methodology at [facility] than at other   [facility]. Direct result comparisons should only   be made within the same method.   LACTATE - Abnormal    Lactate 2.4 (*)     Narrative:     Venipuncture immediately after or during the administration of Metamizole may lead to falsely low results. Testing should be performed immediately prior to Metamizole dosing.   URINALYSIS MICROSCOPIC WITH REFLEX CULTURE - Abnormal    WBC, Urine 1-5      RBC, Urine 6-10 (*)     Bacteria, Urine 1+ (*)     Mucus, Urine FEW     PROTIME-INR - Normal    Protime 11.9      INR 1.2      Narrative:     INR Therapeutic Range: 2.0-3.5   SARS-COV-2 PCR - Normal    Coronavirus 2019, PCR Not Detected      Narrative:     This assay has received  FDA Emergency Use Authorization (EUA) and is only authorized for the duration of time that circumstances exist to justify the authorization of the emergency use of in vitro diagnostic tests for the detection of SARS-CoV-2 virus and/or diagnosis of COVID-19 infection under section 564(b)(1) of the Act, 21 U.S.C. 360bbb-3(b)(1). This assay is an in vitro diagnostic nucleic acid amplification test for the qualitative detection of SARS-CoV-2 from nasopharyngeal specimens and has been validated for use at [facility]. Negative results do not preclude COVID-19 infections and should not be used as the sole basis for diagnosis, treatment, or other management decisions.     INFLUENZA A AND B PCR - Normal    Flu A Result Not Detected      Flu B Result Not Detected      Narrative:     This assay is an in vitro diagnostic multiplex nucleic acid amplification test for the detection and discrimination of Influenza A & B from nasopharyngeal specimens, and has been validated for use at [facility]. Negative results do not preclude Influenza A/B infections, and should not be used as the sole basis for diagnosis, treatment, or other management decisions. If Influenza A/B and RSV PCR results are negative, testing for Parainfluenza virus, Adenovirus and Metapneumovirus is routinely performed for [facility] pediatric oncology and intensive care inpatients, and is available on other patients by placing an add-on request.   LACTATE - Normal    Lactate 1.3      Narrative:     Venipuncture immediately after or during the administration of Metamizole may lead to falsely low results. Testing should be performed immediately prior to Metamizole dosing.   BLOOD CULTURE   BLOOD CULTURE   TROPONIN SERIES- (INITIAL, 1 HR)    Narrative:     The following orders were created for panel order Troponin I Series, High Sensitivity (0, 1 HR).  Procedure                               Abnormality         Status                      ---------                               -----------         ------                     Troponin I, High Sensiti...[[id number]]  Abnormal            Final result               Troponin, High Sensitivi...[[id number]]  Abnormal            Final result                 Please view results for these tests on the individual orders.   URINALYSIS WITH REFLEX CULTURE AND MICROSCOPIC    Narrative:     The following orders were created for panel order Urinalysis with Reflex Culture and Microscopic.  Procedure                               Abnormality         Status                     ---------                               -----------         ------                     Urinalysis with Reflex C...[[id number]]  Abnormal            Final result               Extra Urine Gray Tube[[id number]]                                                         Please view results for these tests on the individual orders.   EXTRA URINE GRAY TUBE   LACTATE     CT chest w IV contrast   Final Result   1. Age-indeterminate type B aortic dissection extending from the mid   aortic arch into the visualized portion of the abdominal aorta. There   is associated mild dilatation of the proximal descending aorta   measuring 3.9 cm. The celiac, superior mesenteric and bilateral renal   arteries arise from the true lumen. The true lumen is mildly   compressed by the false lumen.   2. Mild dilatation of the ascending aorta measuring 4.1 cm.   3. Cardiomegaly with severe coronary artery calcifications.   4. Small bilateral pleural effusions with bibasilar areas of   atelectasis.   5. Few scattered small pulmonary nodules measuring up to 7 mm along   the minor fissure. Non-contrast chest CT at 3-6 months is recommended.   If the nodules are stable at time of repeat CT, then future CT at   18-24 months (from today's scan) is considered optional for low-risk   patients, but is recommended for high-risk patients (Per [employer]isch[employer]   Society guidelines).   6.  Large hiatal hernia with intrathoracic stomach.   7. Hepatic steatosis.   8. Multiple loculated cystic structure in the gastrosplenic ligament   measuring 6.4 cm x 4.8 cm. Findings may be secondary to a pancreatic   pseudocyst, although technically indeterminate.  Recommend follow-up   multiphase contrast-enhanced CT of the abdomen   9. Findings of chronic pancreatitis.   Findings discussed with and acknowledged by DOUGLAS Devine at 1:39 PM   Signed by Edwin Malone MD      CT head wo IV contrast   Final Result   No acute intracranial pathology.        MACRO:   None        Signed by: Maira Doherty 12/26/2024 11:16 AM   Dictation workstation:   [id number]      CT cervical spine wo IV contrast   Final Result   No evidence for an acute fracture or subluxation of the cervical   spine. Torticollis.        MACRO:   None        Signed by: Maira Doherty 12/26/2024 11:19 AM   Dictation workstation:   [id number]      XR tibia fibula left 2 views   Final Result   Osteopenia.        No acute fracture or dislocation.        MACRO:   None        Signed by: Zion Keith 12/26/2024 10:17 AM   Dictation workstation:   [id number]      XR chest 1 view   Final Result   Mild cardiomegaly.  Currently without radiographic evidence of CHF or   pneumonia.        Suspicion of either hiatal hernia or tortuous descending thoracic   aorta. Also, suspicion of prominent right cardiac fat pad. CT scan   chest would be needed to further evaluate these findings.        Subacute to chronic appearing mid lateral left rib fractures with   callus formation evident.        MACRO:   None        Signed by: Zion Keith 12/26/2024 10:16 AM   Dictation workstation:   [id number]        Medications   diphth,pertus(acell),tetanus (BoostRIX) 2.5-8-5 Lf-mcg-Lf/0.5mL vaccine 0.5 mL (0.5 mL intramuscular Given 12/26/24 8818)   sodium chloride 0.9 % bolus 500 mL (0 mL intravenous Stopped 12/26/24 8156)   sodium chloride 0.9 % bolus 1,000 mL (0 mL  intravenous Stopped 12/26/24 0951)   cefepime (Maxipime) 1 g in dextrose 5% IV 50 mL (0 g intravenous Stopped 12/26/24 0917)   iohexol (OMNIPaque) 350 mg iodine/mL solution 75 mL (75 mL intravenous Given 12/26/24 1219)     Discharge Medication List as of 12/26/2024  2:29 PM            Procedure  Critical Care    Performed by: Cy Devine PA-C  Authorized by: Conchis Parrish MD    Critical care provider statement:     Critical care time (minutes):  31    Critical care time was exclusive of:  Separately billable procedures and treating other patients    Critical care was necessary to treat or prevent imminent or life-threatening deterioration of the following conditions:  Circulatory failure    Critical care was time spent personally by me on the following activities:  Development of treatment plan with patient or surrogate, discussions with consultants, evaluation of patient's response to treatment, review of old charts, re-evaluation of patient's condition, ordering and review of radiographic studies, ordering and review of laboratory studies and ordering and performing treatments and interventions    Care discussed with: accepting provider at another facility         Cy Devine PA-C  12/26/24 1653

## 2024-12-26 NOTE — PROGRESS NOTES
Attestation/Supervisory note for DOUGLAS Devine      The patient is an 79-year-old male presenting to the emergency department for evaluation of a left lower extremity laceration.  The patient reportedly does have baseline dementia.  EMS reports that they picked him up from his house after his wife called to report that he had a leg laceration and was bleeding.  It is unknown how long he was bleeding but there was reportedly blood all over the house.  The patient is not sure if he fell or how he injured his leg.  He states he does not recall injuring his leg and he did not know that he was bleeding.  He denies any headache or visual changes.  No chest pain or shortness of breath.  No abdominal pain.  No nausea vomiting.  No diarrhea or constipation.  No urinary complaints.  He denies the use of any blood thinners.  All pertinent positives and negatives are recorded above.  All other systems reviewed and otherwise negative.  Vital signs with borderline hypotension and mild tachycardia but otherwise within normal limits.  Physical exam with a well-nourished well-developed male with disheveled appearance but no evidence of acute distress.  HEENT exam with dry mucous membranes but otherwise unremarkable.  He has no evidence of airway compromise or respiratory distress.  Abdominal exam is benign.  He does not have any gross motor, neurologic or vascular deficits on exam.  He has equal pulses bilaterally.  He does have a laceration to the anterior medial aspect of the left lower leg.  No active bleeding at this time.      EKG with sinus tachycardia at 109 bpm, right bundle branch block pattern, LVH criteria, normal ST segment, and inverted T waves in the anterior leads.  The EKG is similar to previous EKGs contained in the electronic medical record      Wound care provided by nursing staff.  Tetanus was updated.        IV fluids ordered.      Diagnostic labs with mild anemia, elevated lactate, mildly elevated BNP, borderline  hyperglycemia, and mildly elevated troponin but otherwise unremarkable.      Initial troponin 37.  Repeat troponin pending at the time of my departure      Initial lactate 2.8.  Repeat lactate 2.4      Cultures were obtained and IV fluids and IV antibiotics were ordered for the sepsis bundle.      XR tibia fibula left 2 views   Final Result   Osteopenia.        No acute fracture or dislocation.        MACRO:   None        Signed by: Zion Keith 12/26/2024 10:17 AM   Dictation workstation:   [id number]      XR chest 1 view   Final Result   Mild cardiomegaly.  Currently without radiographic evidence of CHF or   pneumonia.        Suspicion of either hiatal hernia or tortuous descending thoracic   aorta. Also, suspicion of prominent right cardiac fat pad. CT scan   chest would be needed to further evaluate these findings.        Subacute to chronic appearing mid lateral left rib fractures with   callus formation evident.        MACRO:   None        Signed by: Zion Keith 12/26/2024 10:16 AM   Dictation workstation:   [id number]      CT head wo IV contrast    (Results Pending)   CT cervical spine wo IV contrast    (Results Pending)        The patient does not have any evidence of a STEMI on EKG.  No events on telemetry.  He does not have any gross motor, neurologic or vascular episodes on exam.  No visible or palpable bony deformities.  Chest x-ray with mild cardiomegaly but no evidence of pneumonia or CHF.  There is evidence of a tortuous aorta versus hiatal hernia and the radiologist is requesting a CT chest for further evaluation of these.  It was ordered.  It was pending at the time of my departure.  There is subacute to chronic appearing mid lateral left rib fractures with callus formation.  There is no evidence of fracture or dislocation on the x-ray of the left tib-fib.  CT imaging was pending at the time of my departure.      DOUGLAS Devine will continue manage the patient primarily.  Anticipate disposition  based on the results of the CT imaging and repeat troponin.  I do believe that he will need to be admitted for further management given that he is triggering the sepsis bundle although there was no evidence of infection on exam and/or diagnostic testing at this time.      Impression/diagnosis  Left lower leg laceration  Anemia, unspecified  Lactic acidosis      I independently interpreted the following study (S) EKG and diagnostic labs      I personally discussed the patient's management with the patient      I reviewed the results of the diagnostic labs and diagnostic imaging.  Formal radiology read was completed by the radiologist.      Conchis Parrish MD

## 2024-12-27 ENCOUNTER — APPOINTMENT (OUTPATIENT)
Dept: CARDIOLOGY | Facility: HOSPITAL | Age: 79
DRG: 300 | End: 2024-12-27
Payer: MEDICARE

## 2024-12-27 PROBLEM — R54 FRAILTY: Status: ACTIVE | Noted: 2024-12-27

## 2024-12-27 LAB
ALBUMIN SERPL BCP-MCNC: 2.5 G/DL (ref 3.4–5)
ALP SERPL-CCNC: 77 U/L (ref 33–136)
ALT SERPL W P-5'-P-CCNC: 16 U/L (ref 10–52)
ANION GAP SERPL CALC-SCNC: 8 MMOL/L (ref 10–20)
AORTIC VALVE MEAN GRADIENT: 4 MMHG
AORTIC VALVE PEAK VELOCITY: 1.35 M/S
AST SERPL W P-5'-P-CCNC: 23 U/L (ref 9–39)
AV PEAK GRADIENT: 7 MMHG
AVA (PEAK VEL): 3.92 CM2
AVA (VTI): 3.68 CM2
BASOPHILS # BLD AUTO: 0.01 X10*3/UL (ref 0–0.1)
BASOPHILS NFR BLD AUTO: 0.2 %
BILIRUB SERPL-MCNC: 0.6 MG/DL (ref 0–1.2)
BUN SERPL-MCNC: 20 MG/DL (ref 6–23)
CALCIUM SERPL-MCNC: 7.9 MG/DL (ref 8.6–10.6)
CARDIAC TROPONIN I PNL SERPL HS: 69 NG/L (ref 0–53)
CARDIAC TROPONIN I PNL SERPL HS: 89 NG/L (ref 0–53)
CARDIAC TROPONIN I PNL SERPL HS: 95 NG/L (ref 0–53)
CHLORIDE SERPL-SCNC: 111 MMOL/L (ref 98–107)
CO2 SERPL-SCNC: 27 MMOL/L (ref 21–32)
CREAT SERPL-MCNC: 0.71 MG/DL (ref 0.5–1.3)
EGFRCR SERPLBLD CKD-EPI 2021: >90 ML/MIN/1.73M*2
EJECTION FRACTION APICAL 4 CHAMBER: 61.9
EJECTION FRACTION: 63 %
EOSINOPHIL # BLD AUTO: 0.08 X10*3/UL (ref 0–0.4)
EOSINOPHIL NFR BLD AUTO: 1.5 %
ERYTHROCYTE [DISTWIDTH] IN BLOOD BY AUTOMATED COUNT: 14 % (ref 11.5–14.5)
GLUCOSE BLD MANUAL STRIP-MCNC: 92 MG/DL (ref 74–99)
GLUCOSE SERPL-MCNC: 88 MG/DL (ref 74–99)
HCT VFR BLD AUTO: 24.4 % (ref 41–52)
HGB BLD-MCNC: 8.2 G/DL (ref 13.5–17.5)
IMM GRANULOCYTES # BLD AUTO: 0.02 X10*3/UL (ref 0–0.5)
IMM GRANULOCYTES NFR BLD AUTO: 0.4 % (ref 0–0.9)
LEFT VENTRICLE INTERNAL DIMENSION DIASTOLE: 4.1 CM (ref 3.5–6)
LEFT VENTRICULAR OUTFLOW TRACT DIAMETER: 2.4 CM
LYMPHOCYTES # BLD AUTO: 0.71 X10*3/UL (ref 0.8–3)
LYMPHOCYTES NFR BLD AUTO: 13.2 %
MAGNESIUM SERPL-MCNC: 1.78 MG/DL (ref 1.6–2.4)
MCH RBC QN AUTO: 31.7 PG (ref 26–34)
MCHC RBC AUTO-ENTMCNC: 33.6 G/DL (ref 32–36)
MCV RBC AUTO: 94 FL (ref 80–100)
MITRAL VALVE E/A RATIO: 0.72
MONOCYTES # BLD AUTO: 0.45 X10*3/UL (ref 0.05–0.8)
MONOCYTES NFR BLD AUTO: 8.3 %
NEUTROPHILS # BLD AUTO: 4.12 X10*3/UL (ref 1.6–5.5)
NEUTROPHILS NFR BLD AUTO: 76.4 %
NRBC BLD-RTO: 0 /100 WBCS (ref 0–0)
PLATELET # BLD AUTO: 118 X10*3/UL (ref 150–450)
POTASSIUM SERPL-SCNC: 4 MMOL/L (ref 3.5–5.3)
PROT SERPL-MCNC: 4.4 G/DL (ref 6.4–8.2)
RBC # BLD AUTO: 2.59 X10*6/UL (ref 4.5–5.9)
RIGHT VENTRICLE FREE WALL PEAK S': 14.6 CM/S
RIGHT VENTRICLE PEAK SYSTOLIC PRESSURE: 24.5 MMHG
SODIUM SERPL-SCNC: 142 MMOL/L (ref 136–145)
TRICUSPID ANNULAR PLANE SYSTOLIC EXCURSION: 2.6 CM
WBC # BLD AUTO: 5.4 X10*3/UL (ref 4.4–11.3)

## 2024-12-27 PROCEDURE — 2500000001 HC RX 250 WO HCPCS SELF ADMINISTERED DRUGS (ALT 637 FOR MEDICARE OP)

## 2024-12-27 PROCEDURE — 37799 UNLISTED PX VASCULAR SURGERY: CPT

## 2024-12-27 PROCEDURE — 99291 CRITICAL CARE FIRST HOUR: CPT

## 2024-12-27 PROCEDURE — 85025 COMPLETE CBC W/AUTO DIFF WBC: CPT

## 2024-12-27 PROCEDURE — 93010 ELECTROCARDIOGRAM REPORT: CPT | Performed by: INTERNAL MEDICINE

## 2024-12-27 PROCEDURE — 1100000001 HC PRIVATE ROOM DAILY

## 2024-12-27 PROCEDURE — C8929 TTE W OR WO FOL WCON,DOPPLER: HCPCS

## 2024-12-27 PROCEDURE — 84075 ASSAY ALKALINE PHOSPHATASE: CPT

## 2024-12-27 PROCEDURE — 2500000004 HC RX 250 GENERAL PHARMACY W/ HCPCS (ALT 636 FOR OP/ED)

## 2024-12-27 PROCEDURE — 93306 TTE W/DOPPLER COMPLETE: CPT | Performed by: SPECIALIST

## 2024-12-27 PROCEDURE — 83735 ASSAY OF MAGNESIUM: CPT

## 2024-12-27 PROCEDURE — 2500000002 HC RX 250 W HCPCS SELF ADMINISTERED DRUGS (ALT 637 FOR MEDICARE OP, ALT 636 FOR OP/ED)

## 2024-12-27 PROCEDURE — 99221 1ST HOSP IP/OBS SF/LOW 40: CPT | Performed by: STUDENT IN AN ORGANIZED HEALTH CARE EDUCATION/TRAINING PROGRAM

## 2024-12-27 PROCEDURE — 82607 VITAMIN B-12: CPT

## 2024-12-27 PROCEDURE — 03HY32Z INSERTION OF MONITORING DEVICE INTO UPPER ARTERY, PERCUTANEOUS APPROACH: ICD-10-PCS | Performed by: INTERNAL MEDICINE

## 2024-12-27 PROCEDURE — 97162 PT EVAL MOD COMPLEX 30 MIN: CPT | Mod: GP

## 2024-12-27 PROCEDURE — 82947 ASSAY GLUCOSE BLOOD QUANT: CPT

## 2024-12-27 PROCEDURE — 84484 ASSAY OF TROPONIN QUANT: CPT

## 2024-12-27 PROCEDURE — 82746 ASSAY OF FOLIC ACID SERUM: CPT

## 2024-12-27 PROCEDURE — 97166 OT EVAL MOD COMPLEX 45 MIN: CPT | Mod: GO

## 2024-12-27 RX ORDER — METOPROLOL TARTRATE 25 MG/1
12.5 TABLET, FILM COATED ORAL 2 TIMES DAILY
Status: DISCONTINUED | OUTPATIENT
Start: 2024-12-27 | End: 2024-12-28

## 2024-12-27 RX ORDER — LOSARTAN POTASSIUM 25 MG/1
25 TABLET ORAL DAILY
Status: DISCONTINUED | OUTPATIENT
Start: 2024-12-27 | End: 2024-12-27

## 2024-12-27 RX ORDER — QUETIAPINE FUMARATE 25 MG/1
12.5 TABLET, FILM COATED ORAL ONCE
Status: COMPLETED | OUTPATIENT
Start: 2024-12-27 | End: 2024-12-27

## 2024-12-27 RX ORDER — OLANZAPINE 5 MG/1
5 TABLET, ORALLY DISINTEGRATING ORAL ONCE
Status: DISCONTINUED | OUTPATIENT
Start: 2024-12-27 | End: 2024-12-31

## 2024-12-27 RX ORDER — CAPTOPRIL 12.5 MG/1
12.5 TABLET ORAL ONCE
Status: DISCONTINUED | OUTPATIENT
Start: 2024-12-27 | End: 2024-12-27

## 2024-12-27 RX ORDER — METOPROLOL TARTRATE 25 MG/1
6.25 TABLET, FILM COATED ORAL EVERY 6 HOURS
Status: DISCONTINUED | OUTPATIENT
Start: 2024-12-27 | End: 2024-12-27

## 2024-12-27 RX ORDER — HYDROXYZINE HYDROCHLORIDE 10 MG/1
10 TABLET, FILM COATED ORAL ONCE
Status: COMPLETED | OUTPATIENT
Start: 2024-12-27 | End: 2024-12-27

## 2024-12-27 RX ORDER — HALOPERIDOL 5 MG/ML
2 INJECTION INTRAMUSCULAR ONCE
Status: COMPLETED | OUTPATIENT
Start: 2024-12-27 | End: 2024-12-27

## 2024-12-27 RX ORDER — CAPTOPRIL 12.5 MG/1
6.25 TABLET ORAL 3 TIMES DAILY
Status: DISCONTINUED | OUTPATIENT
Start: 2024-12-27 | End: 2024-12-27

## 2024-12-27 RX ORDER — HALOPERIDOL 5 MG/ML
INJECTION INTRAMUSCULAR
Status: COMPLETED
Start: 2024-12-27 | End: 2024-12-27

## 2024-12-27 RX ORDER — CAPTOPRIL 12.5 MG/1
12.5 TABLET ORAL ONCE
Status: COMPLETED | OUTPATIENT
Start: 2024-12-27 | End: 2024-12-27

## 2024-12-27 RX ORDER — CAPTOPRIL 12.5 MG/1
12.5 TABLET ORAL 3 TIMES DAILY
Status: DISCONTINUED | OUTPATIENT
Start: 2024-12-27 | End: 2024-12-27

## 2024-12-27 RX ORDER — CAPTOPRIL 12.5 MG/1
TABLET ORAL
Status: COMPLETED
Start: 2024-12-27 | End: 2024-12-27

## 2024-12-27 RX ORDER — CAPTOPRIL 12.5 MG/1
25 TABLET ORAL 3 TIMES DAILY
Status: DISCONTINUED | OUTPATIENT
Start: 2024-12-27 | End: 2024-12-28

## 2024-12-27 RX ADMIN — HALOPERIDOL LACTATE 2 MG: 5 INJECTION, SOLUTION INTRAMUSCULAR at 02:15

## 2024-12-27 RX ADMIN — CAPTOPRIL 12.5 MG: 12.5 TABLET ORAL at 14:58

## 2024-12-27 RX ADMIN — PERFLUTREN 1 ML OF DILUTION: 6.52 INJECTION, SUSPENSION INTRAVENOUS at 09:48

## 2024-12-27 RX ADMIN — METOPROLOL TARTRATE 12.5 MG: 25 TABLET, FILM COATED ORAL at 11:11

## 2024-12-27 RX ADMIN — METOPROLOL TARTRATE 12.5 MG: 25 TABLET, FILM COATED ORAL at 20:40

## 2024-12-27 RX ADMIN — CAPTOPRIL 25 MG: 12.5 TABLET ORAL at 20:39

## 2024-12-27 RX ADMIN — CAPTOPRIL 6.25 MG: 12.5 TABLET ORAL at 11:12

## 2024-12-27 RX ADMIN — QUETIAPINE FUMARATE 12.5 MG: 25 TABLET ORAL at 00:33

## 2024-12-27 RX ADMIN — HYDROXYZINE HYDROCHLORIDE 10 MG: 10 TABLET ORAL at 23:32

## 2024-12-27 RX ADMIN — CAPTOPRIL 12.5 MG: 12.5 TABLET ORAL at 18:42

## 2024-12-27 RX ADMIN — ENOXAPARIN SODIUM 40 MG: 100 INJECTION SUBCUTANEOUS at 16:52

## 2024-12-27 RX ADMIN — NICARDIPINE HYDROCHLORIDE 2.5 MG/HR: 0.2 INJECTION, SOLUTION INTRAVENOUS at 00:38

## 2024-12-27 ASSESSMENT — PAIN SCALES - GENERAL
PAINLEVEL_OUTOF10: 0 - NO PAIN

## 2024-12-27 ASSESSMENT — COGNITIVE AND FUNCTIONAL STATUS - GENERAL
DRESSING REGULAR LOWER BODY CLOTHING: TOTAL
CLIMB 3 TO 5 STEPS WITH RAILING: TOTAL
PERSONAL GROOMING: TOTAL
TURNING FROM BACK TO SIDE WHILE IN FLAT BAD: TOTAL
TOILETING: TOTAL
DRESSING REGULAR UPPER BODY CLOTHING: TOTAL
MOVING FROM LYING ON BACK TO SITTING ON SIDE OF FLAT BED WITH BEDRAILS: TOTAL
EATING MEALS: TOTAL
MOBILITY SCORE: 6
MOVING TO AND FROM BED TO CHAIR: TOTAL
STANDING UP FROM CHAIR USING ARMS: TOTAL
HELP NEEDED FOR BATHING: TOTAL
WALKING IN HOSPITAL ROOM: TOTAL
DAILY ACTIVITIY SCORE: 6

## 2024-12-27 ASSESSMENT — ACTIVITIES OF DAILY LIVING (ADL): BATHING_ASSISTANCE: TOTAL

## 2024-12-27 ASSESSMENT — PAIN - FUNCTIONAL ASSESSMENT
PAIN_FUNCTIONAL_ASSESSMENT: 0-10
PAIN_FUNCTIONAL_ASSESSMENT: CPOT (CRITICAL CARE PAIN OBSERVATION TOOL)
PAIN_FUNCTIONAL_ASSESSMENT: 0-10
PAIN_FUNCTIONAL_ASSESSMENT: 0-10

## 2024-12-27 NOTE — PROGRESS NOTES
"Elian Betancourt is a 79 y.o. male on day 1 of admission presenting with Dissection of descending aorta (Multi).      Subjective   Sleeping this morning. Will wake up and say \"what\" then goes back to sleep.       Objective     Last Recorded Vitals  BP (!) 88/44   Pulse 56   Temp 36.1 °C (97 °F)   Resp 17   Wt 72.6 kg (160 lb 0.9 oz)   SpO2 98%   Intake/Output last 3 Shifts:    Intake/Output Summary (Last 24 hours) at 12/27/2024 1724  Last data filed at 12/27/2024 1600  Gross per 24 hour   Intake 371.46 ml   Output 1050 ml   Net -678.54 ml       Admission Weight  Weight: 72.6 kg (160 lb) (12/26/24 1642)    Daily Weight  12/27/24 : 72.6 kg (160 lb 0.9 oz)    Image Results  Transthoracic Echo (TTE) Complete     [facility], [address] [place] [address]                 Tel [phone] and Fax [phone]    TRANSTHORACIC ECHOCARDIOGRAM REPORT       Patient Name:       ELIAN BETANCOURT       Reading Physician:    [id number] Mani Marcos MD  Study Date:         12/27/2024          Ordering Provider:    [id number] MARVIN GONG  MRN/PID:            [id number]            Fellow:               [id number] Moriah Jones MD  Accession#:         [id number]        Nurse:  Date of Birth/Age:  1945 / 79      Sonographer:          Claudette ibarra                                     SURAJ  Gender assigned at  M                   Additional Staff:  Birth:  Height:             175.26 cm           Admit Date:  Weight:             72.58 kg            Admission Status:     Inpatient -                                                                Routine  BSA / BMI:          1.88 m2 / 23.63     Encounter#:           [id number]                      kg/m2  Blood Pressure:     119/47 mmHg         Department " Location:  [facility]    Study Type:    TRANSTHORACIC ECHO (TTE) COMPLETE  Diagnosis/ICD: Dissection of unspecified site of aorta-I71.00  Indication:    type B dissection  CPT Code:      Echo Complete w Full Doppler-[id number]    Patient History:  Pertinent History: HTN, recurrent falls, type b dissection.    Study Detail: The following Echo studies were performed: 2D, M-Mode, Doppler and                color flow. Definity used as a contrast agent for endocardial                border definition. Total contrast used for this procedure was 1 mL                via IV push. Unable to obtain subcostal view.       PHYSICIAN INTERPRETATION:  Left Ventricle: Left ventricular ejection fraction is normal, calculated by Sosa's biplane at 63%. There is concentric left ventricular hypertrophy. There are no regional left ventricular wall motion abnormalities. The left ventricular cavity size is normal. There is mildly increased septal and mildly increased posterior left ventricular wall thickness. There is left ventricular concentric remodeling. Left ventricular diastolic filling is indeterminate.  Left Atrium: The left atrium is normal in size.  Right Ventricle: The right ventricle is mildly enlarged. There is normal right ventricular global systolic function.  Right Atrium: The right atrium is mildly dilated.  Aortic Valve: The aortic valve is trileaflet. The aortic valve dimensionless index is 0.81. There is moderate aortic valve regurgitation. The peak instantaneous gradient of the aortic valve is 7 mmHg. The mean gradient of the aortic valve is 4 mmHg.  Mitral Valve: The mitral valve is normal in structure. There is trace mitral valve regurgitation.  Tricuspid Valve: The tricuspid valve is structurally normal. There is mild tricuspid regurgitation.  Pulmonic Valve: The pulmonic valve is structurally normal. There is trace pulmonic valve regurgitation.  Pericardium: Small pericardial effusion.  Aorta: The aortic root is  abnormal. There is mild dilatation of the ascending aorta.  In comparison to the previous echocardiogram(s): There are no prior studies on this patient for comparison purposes.       CONCLUSIONS:   1. Left ventricular ejection fraction is normal, calculated by Sosa's biplane at 63%.   2. Left ventricular diastolic filling is indeterminate.   3. There is normal right ventricular global systolic function.   4. Mildly enlarged right ventricle.   5. Poorly visualized aortic valve with probably moderate aortic valve regurgitation.    QUANTITATIVE DATA SUMMARY:     2D MEASUREMENTS:          Normal Ranges:  Ao Root d:       3.80 cm  (2.0-3.7cm)  LAs:             3.60 cm  (2.7-4.0cm)  IVSd:            1.30 cm  (0.6-1.1cm)  LVPWd:           1.34 cm  (0.6-1.1cm)  LVIDd:           4.10 cm  (3.9-5.9cm)  LVIDs:           2.80 cm  LV Mass Index:   105 g/m2  LVEDV Index:     78 ml/m2  LV % FS          31.7 %       RA VOLUME BY A/L METHOD:          Normal Ranges:  RA Area A4C:             20.3 cm2       AORTA MEASUREMENTS:         Normal Ranges:  Asc Ao, d:          3.90 cm (2.1-3.4cm)       LV SYSTOLIC FUNCTION BY 2D PLANIMETRY (MOD):                       Normal Ranges:  EF-A4C View:    62 % (>=55%)  EF-A2C View:    60 %  EF-Biplane:     63 %  LV EF Reported: 63 %       LV DIASTOLIC FUNCTION:             Normal Ranges:  MV Peak E:             0.60 m/s    (0.7-1.2 m/s)  MV Peak A:             0.83 m/s    (0.42-0.7 m/s)  E/A Ratio:             0.72        (1.0-2.2)  MV e'                  0.068 m/s   (>8.0)  MV lateral e'          0.08 m/s  MV medial e'           0.05 m/s  MV A Dur:              106.00 msec  E/e' Ratio:            8.76        (<8.0)       MITRAL VALVE:          Normal Ranges:  MV DT:        187 msec (150-240msec)       AORTIC VALVE:                     Normal Ranges:  AoV Vmax:                1.35 m/s (<=1.7m/s)  AoV Peak P.3 mmHg (<20mmHg)  AoV Mean P.0 mmHg (1.7-11.5mmHg)  LVOT  Max Brian:            1.17 m/s (<=1.1m/s)  AoV VTI:                 28.00 cm (18-25cm)  LVOT VTI:                22.80 cm  LVOT Diameter:           2.40 cm  (1.8-2.4cm)  AoV Area, VTI:           3.68 cm2 (2.5-5.5cm2)  AoV Area,Vmax:           3.92 cm2 (2.5-4.5cm2)  AoV Dimensionless Index: 0.81       AORTIC INSUFFICIENCY:  AI Vmax:       3.62 m/s  AI Half-time:  641 msec  AI Decel Rate: 166.00 cm/s2       RIGHT VENTRICLE:  RV Basal 4.20 cm  RV Mid   3.60 cm  RV Major 8.4 cm  TAPSE:   25.8 mm  RV s'    0.15 m/s       TRICUSPID VALVE/RVSP:          Normal Ranges:  Peak TR Velocity:     2.32 m/s  RV Syst Pressure:     25 mmHg  (< 30mmHg)       PULMONIC VALVE:          Normal Ranges:  PV Accel Time:  156 msec (>120ms)  PV Max Brian:     1.2 m/s  (0.6-0.9m/s)  PV Max P.6 mmHg       [id number] Mani Marcos MD  Electronically signed on 2024 at 11:21:40 AM       ** Final **      Physical Exam  Physical Exam  General: NAD, appears comfortable  HEENT: NCAT, no scleral icterus, EOMI  Heart: RRR, no m/r/g  Lungs: CTAB, no rales/ronchi/wheezing  Abdomen: soft, NT, ND, +BS  Ext: no bl LE edema  Neuro: Aox0  Psych: mood and affect appropriate    Relevant Results               Assessment/Plan                  Assessment & Plan  Dissection of descending aorta (Multi)    Frailty    Mr. Betancourt is a 79M with PMHx of dementia, DDD, HTN, DLD, recurrent falls who presents to [facility] [place] ED for evaluation of LLE laceration after fall incidentally found to have type B aortic dissection. Transferred to  CICU for further evaluation of dissection. CTA CAP showing type B dissection, bilateral renal arteries, celiac trunk, superior mesenteric artery and inferior mesenteric artery arise from the true lumen.     Updates   -Wean off Nicardipine ggt.  -Start Metoprolol and captopril - titrate to meet impulse control goals.     Neuro:  #Dementia   #Recurrent falls  ::CTH/C-spine- no evidence for an acute fracture or subluxation of the  cervical spine. No acute intracranial pathology.   -Continue to monitor  -Delirium precautions     Cardiac:  #Type B Dissection  #HTN  ::CT Chest 12/26- Age-indeterminate type B aortic dissection extending from the mid aortic arch into the visualized portion of the abdominal aorta. There is associated mild dilatation of theproximal descending aorta measuring 3.9 cm. The celiac, superior mesenteric and bilateral renal arteries arise from the true lumen. The true lumen is mildly compressed by the false lumen.   ::CTA CAP 12/26- type B dissection, bilateral renal arteries, celiac trunk, superior mesenteric artery   and inferior mesenteric artery arise from the true lumen.   -Vascular surgery consult  -Cardiac surgery consult (given dissection starts at aortic arch)  -Impulse control (HR<60, SBP<120): nicardipine gtt --> weaning off  -Start Metoprolol and captopril  -Serial abdominal exams and NV checks   -TTE ordered      Pulm:   #Pulmonary nodules   ::7mm nodules   -repeat CT in 3-6 months      Renal:  -WENDY     GI:  ::Multiple loculated cystic structure in the gastrosplenic ligament measuring 6.4 cm x 4.8 cm. Findings may be secondary to a pancreatic pseudocyst, although technically indeterminate   -Recommend f/up multiphase contrast enhanced CT abdomen      Heme/Onc:  ::Hgb ~13  -Hgb on admission 11.5-->10   -F/up anemia labs  -Maintain active T&S     MSK:  #Fall with LLE laceration  ::XR tib/fib LLE: Osteopenia. No acute fracture or dislocation.   -S/p lac repair by the ED     ID:  #C/f sepsis   -s/p Cefipime x1 at OSH ED. Hold off reordering abx d/t low c/f sepsis.   -f/up blood cultures 12/26     F: s/p 1.5L LR  E: PRN  N: cardiac  G: none  A: PIV, L radial line  DVT: lovenox  CODE: DNR/DNI (confirmed with wife 12/26)    NOK: Wife Wendy [phone]     Patient seen and staffed with Dr. Gillombardo Andrea Janis, MD  Internal Medicine-Pediatrics PGY2  [facility]           uLke Aguilar MD

## 2024-12-27 NOTE — CONSULTS
Wound Care Consult     Visit Date: 12/27/2024      Patient Name: Elian Betancourt         MRN: [id number]           YOB: 1945     Reason for Consult: Bilateral feet wound and LLE wounds.         Wound History: 79-year-old male presenting to the emergency department for evaluation of a left lower extremity laceration      Pertinent Labs:   Albumin   Date Value Ref Range Status   12/27/2024 2.5 (L) 3.4 - 5.0 g/dL Final       Wound Assessment:  Wound 12/26/24 Pretibial Left (Active)   Wound Image    12/27/24 1101   Shape irregular 12/27/24 1101   Wound Length (cm) 5 cm 12/27/24 1101   Wound Width (cm) 1 cm 12/27/24 1101   Wound Surface Area (cm^2) 5 cm^2 12/27/24 1101   State of Healing Non-healing 12/27/24 1101   Margins Unable to assess 12/27/24 1101   Drainage Description Serosanguineous 12/27/24 1101   Drainage Amount Scant 12/27/24 1101   Dressing Steri-strips;Silicone border dressing 12/27/24 1101   Dressing Changed New 12/27/24 1101   Dressing Status Clean;Dry 12/27/24 1101       Wound 12/26/24 Dorsal foot;Right (Active)   Wound Image   12/27/24 1100   Shape round 12/27/24 1100   Wound Length (cm) 1 cm 12/27/24 1100   Wound Width (cm) 1 cm 12/27/24 1100   Wound Surface Area (cm^2) 1 cm^2 12/27/24 1100   Wound Depth (cm) 0 cm 12/27/24 1100   Wound Volume (cm^3) 0 cm^3 12/27/24 1100   State of Healing Healing ridge 12/27/24 1100   Margins Well-defined edges 12/27/24 1100   Drainage Description None 12/27/24 1100   Drainage Amount None 12/27/24 1100   Dressing Barrier film 12/27/24 1100   Dressing Changed Changed 12/26/24 1600       Wound 12/26/24 Dorsal foot;Left (Active)   Wound Image   12/27/24 1100   Shape round 12/27/24 1100   Wound Length (cm) 1 cm 12/27/24 1100   Wound Width (cm) 1 cm 12/27/24 1100   Wound Surface Area (cm^2) 1 cm^2 12/27/24 1100   Wound Depth (cm) 0 cm 12/27/24 1100   Wound Volume (cm^3) 0 cm^3 12/27/24 1100   State of Healing Healing ridge 12/27/24 1100   Margins Well-defined  edges 12/27/24 1100   Drainage Description None 12/27/24 1100   Drainage Amount None 12/27/24 1100   Dressing Barrier film;Open to air 12/27/24 1100   Dressing Changed Changed 12/26/24 1600       Wound Team Summary Assessment: The wound care team came to bedside to assess the patient's BLE wounds related to a fall.  The patient has dry wounds his bilateral second toes. The patient feet were cleansed with vashe wound cleanser and gently pat dry. Since the wounds are dry, barrier film was applied to the wounds and left open to air.  The patient has a LLE laceration that currently has steri strips over the laceration. The steri strips are dry and intact with dried blood strikethrough. The patient also has 2 dry wounds on the LLE that were cleansed with vashe, barrier film applied and left open to air.      Wound Team Plan: Recommendation: Daily     Bilateral feet wounds: Cleanse the feet with vashe wound cleanser and gently pat dry   Apply barrier film to the wounds and leave open to air     Left lower extremities: Cleanse the wounds with vashe wound cleanser and gently pat dry   Apply barrier film on the dry wounds and leave open to air or apply mepilex lite silicone dressing    LLE laceration: Patient has steri strips in place. The steri strips will fall off in time. A mepilex lite was applied over top.       JOSEPH Inman  12/27/2024  12:59 PM

## 2024-12-27 NOTE — CARE PLAN
Problem: Safety - Medical Restraint  Goal: Remains free of injury from restraints (Restraint for Interference with Medical Device)  Outcome: Progressing  Flowsheets (Taken 12/26/2024 2248)  Remains free of injury from restraints (restraint for interference with medical device): Every 2 hours: Monitor safety, psychosocial status, comfort, nutrition and hydration  Goal: Free from restraint(s) (Restraint for Interference with Medical Device)  Outcome: Progressing  Flowsheets (Taken 12/26/2024 2248)  Free from restraint(s) (restraint for interference with medical device): Identify and implement measures to help patient regain control     Problem: Skin  Goal: Decreased wound size/increased tissue granulation at next dressing change  Outcome: Progressing  Flowsheets (Taken 12/26/2024 2248)  Decreased wound size/increased tissue granulation at next dressing change: Promote sleep for wound healing  Goal: Participates in plan/prevention/treatment measures  Outcome: Progressing  Flowsheets (Taken 12/26/2024 2248)  Participates in plan/prevention/treatment measures: Elevate heels  Goal: Prevent/manage excess moisture  Outcome: Progressing  Flowsheets (Taken 12/26/2024 2248)  Prevent/manage excess moisture: Monitor for/manage infection if present  Goal: Prevent/minimize sheer/friction injuries  Outcome: Progressing  Flowsheets (Taken 12/26/2024 2248)  Prevent/minimize sheer/friction injuries: Use pull sheet  Goal: Promote/optimize nutrition  Outcome: Progressing  Flowsheets (Taken 12/26/2024 2248)  Promote/optimize nutrition: Monitor/record intake including meals  Goal: Promote skin healing  Outcome: Progressing  Flowsheets (Taken 12/26/2024 2248)  Promote skin healing: Turn/reposition every 2 hours/use positioning/transfer devices     Problem: Pain - Adult  Goal: Verbalizes/displays adequate comfort level or baseline comfort level  Outcome: Progressing     Problem: Safety - Adult  Goal: Free from fall injury  Outcome:  Progressing     Problem: Discharge Planning  Goal: Discharge to home or other facility with appropriate resources  Outcome: Progressing     Problem: Chronic Conditions and Co-morbidities  Goal: Patient's chronic conditions and co-morbidity symptoms are monitored and maintained or improved  Outcome: Progressing

## 2024-12-27 NOTE — H&P
History Of Present Illness  Elian Betancourt is a 79 y.o. male presenting with aortic dissection.    Mr. Betancourt is a 79M with PMHx of dementia, DDD, HTN, DLD, recurrent falls who presents to [facility] ED for evaluation of LLE laceration and being transferred for type B dissection.   Pt is a poor historian due to dementia. Per ED, EMS was called by wife due to a bleeding leg laceration. Unclear how patient fell and hurt his leg. Unsure of any head trauma. Denies headaches, lightheadedness, dizziness, syncope or presyncope. Denies taking blood thinners. Per wife, patient has no other medical hx and takes no medications. He does have a hx of recurrent falls.     ED Course:  Vitals: T 35.9, , RR 16, BP 95/64, 100% RA    Labs:   -CBC: 8.7/11.5/35/179  -RFP: 139/3.8/105/26/23/1.02  -HFP: AST 21, ALT 16, Alkphos 101, Tbili 0.64, Protein 5.3   -Lactate: 2.8<2.4  -Troponin: 37<57  -BNP: 293  -Flu/COVID: neg  -UA: neg nitrite/LE, 6-10 RBCs, 1+ bacteria      Imaging:   -EKG: sinus tachycardia at 109 bpm, right bundle branch block pattern, LVH criteria, normal ST segment, and inverted T waves in the anterior leads   -CXR: Mild cardiomegaly.  Currently without radiographic evidence of CHF or pneumonia. Suspicion of either hiatal hernia or tortuous descending thoracic aorta. Also, suspicion of prominent right cardiac fat pad.   -XR tib/fib LLE: Osteopenia. No acute fracture or dislocation.   -CT C spine: No evidence for an acute fracture or subluxation of the cervical spine. Torticollis.   -CTH: No acute intracranial pathology.   -CT chest w/ IV contrast:   1. Age-indeterminate type B aortic dissection extending from the mid aortic arch into the visualized portion of the abdominal aorta. There is associated mild dilatation of theproximal descending aorta measuring 3.9 cm. The celiac, superior mesenteric and bilateral renal arteries arise from the true lumen. The true lumen is mildly compressed by the false lumen.   2. Mild  dilatation of the ascending aorta measuring 4.1 cm.   3. Cardiomegaly with severe coronary artery calcifications.   4. Small bilateral pleural effusions with bibasilar areas of atelectasis.   5. Few scattered small pulmonary nodules measuring up to 7 mm along the minor fissure. Non-contrast chest CT at 3-6 months is recommended. If the nodules are stable at time of repeat CT, then future CT at 18-24 months (from today's scan) is considered optional for low-risk patients, but is recommended for high-risk patients (Per Fleischner Society guidelines).   6. Large hiatal hernia with intrathoracic stomach.   7. Hepatic steatosis.   8. Multiple loculated cystic structure in the gastrosplenic ligament measuring 6.4 cm x 4.8 cm. Findings may be secondary to a pancreatic pseudocyst, although technically indeterminate. Recommend follow-up multiphase contrast-enhanced CT of the abdomen   9. Findings of chronic pancreatitis.     Interventions: 1.5L NS, Cefepime (c/f sepsis)     Cardiac Hx:  TTE 5/2021:   The left ventricular chamber size is normal.  Global left ventricular wall motion and contractility are within  normal limits.  Estimated ejection fraction is >60%.  Abnormal relaxation filling pattern of the left ventricle for age. (stage 1 diastolic dysfunction)  Mild aortic regurgitation.  Mild mitral regurgitation.  A trace of tricuspid regurgitation.    Past Medical History  Past Medical History:   Diagnosis Date    Abdominal pain 03/08/2024    Allergic rhinitis     Arthritis     Back pain     BPH (benign prostatic hyperplasia)     Cellulitis of left lower limb 04/26/2023    Chronic maxillary sinusitis 09/17/2023    Closed fracture of acromial end of clavicle 04/09/2024    Contusion of face 03/08/2024    DDD (degenerative disc disease), lumbar     DDD (degenerative disc disease), thoracic     Difficulty in walking, not elsewhere classified 04/26/2023    Dysfunction of eustachian tube 09/17/2023    Epistaxis 09/17/2023     Facial laceration 03/08/2024    Hiatal hernia     HTN (hypertension)     Hypercholesteremia     Injury of back 09/17/2023    Leukopenia     Muscle weakness (generalized) 04/26/2023    Nasal discharge 09/17/2023    Pain of left shoulder region 04/09/2024    Personal history of other specified conditions 06/23/2014    History of shortness of breath    Polyp of nasal cavity 03/08/2024    Repeated falls 04/26/2023    Scoliosis 04/26/2023    Varicose veins of both legs with edema     Weakness 04/25/2023       Surgical History  Past Surgical History:   Procedure Laterality Date    MYRINGOTOMY W/ TUBES  10/30/2013    Myringotomy - With Ventilating Tube Insertion    SEPTOPLASTY  09/16/2014    Septoplasty    TONSILLECTOMY  10/30/2013    Tonsillectomy With Adenoidectomy        Social History  He reports that he has never smoked. He has never been exposed to tobacco smoke. He has never used smokeless tobacco. He reports current alcohol use of about 3.0 standard drinks of alcohol per week. He reports that he does not use drugs.    Family History  Family History   Problem Relation Name Age of Onset    Hypertension Mother      Dementia Mother      Lung cancer Father      Stroke Other Grandfather         Allergies  Patient has no known allergies.     Physical Exam  General: NAD, appears comfortable  HEENT: NCAT, no scleral icterus, EOMI  Heart: RRR, no m/r/g  Lungs: CTAB, no rales/ronchi/wheezing  Abdomen: soft, NT, ND, +BS  Ext: no bl LE edema  Neuro: Aox0  Psych: mood and affect appropriate    Last Recorded Vitals  Blood pressure 122/65, pulse 78, resp. rate 18, weight 72.6 kg (160 lb), SpO2 98%.    Relevant Results  Results for orders placed or performed during the hospital encounter of 12/26/24 (from the past 24 hours)   CBC and Auto Differential   Result Value Ref Range    WBC 10.9 4.4 - 11.3 x10*3/uL    nRBC 0.0 0.0 - 0.0 /100 WBCs    RBC 3.09 (L) 4.50 - 5.90 x10*6/uL    Hemoglobin 10.0 (L) 13.5 - 17.5 g/dL    Hematocrit  28.2 (L) 41.0 - 52.0 %    MCV 91 80 - 100 fL    MCH 32.4 26.0 - 34.0 pg    MCHC 35.5 32.0 - 36.0 g/dL    RDW 13.6 11.5 - 14.5 %    Platelets 136 (L) 150 - 450 x10*3/uL    Neutrophils % 88.9 40.0 - 80.0 %    Immature Granulocytes %, Automated 0.3 0.0 - 0.9 %    Lymphocytes % 4.4 13.0 - 44.0 %    Monocytes % 5.8 2.0 - 10.0 %    Eosinophils % 0.3 0.0 - 6.0 %    Basophils % 0.3 0.0 - 2.0 %    Neutrophils Absolute 9.71 (H) 1.60 - 5.50 x10*3/uL    Immature Granulocytes Absolute, Automated 0.03 0.00 - 0.50 x10*3/uL    Lymphocytes Absolute 0.48 (L) 0.80 - 3.00 x10*3/uL    Monocytes Absolute 0.63 0.05 - 0.80 x10*3/uL    Eosinophils Absolute 0.03 0.00 - 0.40 x10*3/uL    Basophils Absolute 0.03 0.00 - 0.10 x10*3/uL   Magnesium   Result Value Ref Range    Magnesium 1.76 1.60 - 2.40 mg/dL   Lactate   Result Value Ref Range    Lactate 1.1 0.4 - 2.0 mmol/L   Type and screen   Result Value Ref Range    ABO TYPE B     Rh TYPE POS     ANTIBODY SCREEN NEG    Hepatic function panel   Result Value Ref Range    Albumin 2.7 (L) 3.4 - 5.0 g/dL    Bilirubin, Total 0.6 0.0 - 1.2 mg/dL    Bilirubin, Direct 0.1 0.0 - 0.3 mg/dL    Alkaline Phosphatase 84 33 - 136 U/L    ALT 16 10 - 52 U/L    AST 19 9 - 39 U/L    Total Protein 4.6 (L) 6.4 - 8.2 g/dL   Phosphorus   Result Value Ref Range    Phosphorus 3.6 2.5 - 4.9 mg/dL   Basic Metabolic Panel   Result Value Ref Range    Glucose 95 74 - 99 mg/dL    Sodium 142 136 - 145 mmol/L    Potassium 4.3 3.5 - 5.3 mmol/L    Chloride 109 (H) 98 - 107 mmol/L    Bicarbonate 27 21 - 32 mmol/L    Anion Gap 10 10 - 20 mmol/L    Urea Nitrogen 23 6 - 23 mg/dL    Creatinine 0.80 0.50 - 1.30 mg/dL    eGFR 89 >60 mL/min/1.73m*2    Calcium 8.1 (L) 8.6 - 10.6 mg/dL         Assessment/Plan   Assessment & Plan  Dissection of descending aorta (Multi)    Mr. Betancourt is a 79M with PMHx of dementia, DDD, HTN, DLD, recurrent falls who presents to [facility] ED for evaluation of LLE laceration after fall incidentally found to have  type B aortic dissection. Transferred to  CICU for further evaluation of dissection. CTA CAP showing type B dissection, bilateral renal arteries, celiac trunk, superior mesenteric artery and inferior mesenteric artery arise from the true lumen.    Neuro:  #Dementia   #Recurrent falls  ::CTH/C-spine- no evidence for an acute fracture or subluxation of the cervical spine. No acute intracranial pathology.   -Continue to monitor  -Delirium precautions    Cardiac:  #Type B Dissection  #HTN  ::CT Chest 12/26- Age-indeterminate type B aortic dissection extending from the mid aortic arch into the visualized portion of the abdominal aorta. There is associated mild dilatation of theproximal descending aorta measuring 3.9 cm. The celiac, superior mesenteric and bilateral renal arteries arise from the true lumen. The true lumen is mildly compressed by the false lumen.   ::CTA CAP 12/26- type B dissection, bilateral renal arteries, celiac trunk, superior mesenteric artery   and inferior mesenteric artery arise from the true lumen.   -Vascular surgery consult  -Cardiac surgery consult (given dissection starts at aortic arch)  -Impulse control (HR<60, SBP<120): nicardipine gtt   -Serial abdominal exams and NV checks   -TTE ordered     Pulm:   #Pulmonary nodules   ::7mm nodules   -repeat CT in 3-6 months     Renal:  -WENDY    GI:  ::Multiple loculated cystic structure in the gastrosplenic ligament measuring 6.4 cm x 4.8 cm. Findings may be secondary to a pancreatic pseudocyst, although technically indeterminate   -Recommend f/up multiphase contrast enhanced CT abdomen     Heme/Onc:  ::Hgb ~13  -Hgb on admission 11.5-->10   -F/up anemia labs  -Maintain active T&S    MSK:  #Fall with LLE laceration  ::XR tib/fib LLE: Osteopenia. No acute fracture or dislocation.   -S/p lac repair by the ED    ID:  #C/f sepsis   -s/p Cefipime x1 at OSH ED. Hold off reordering abx d/t low c/f sepsis.   -f/up blood cultures 12/26    F: s/p 1.5L LR  E:  PRN  N: cardiac  G: none  A: PIV, L radial line  DVT: lovenox  CODE: DNR/DNI (confirmed with wife 12/26)    NOK: Wife Wendy [phone]     Delia West MD  Internal Medicine, PGY3

## 2024-12-27 NOTE — PROGRESS NOTES
Occupational Therapy    Evaluation    Patient Name: Elian Betancourt  MRN: [id number]  Today's Date: 12/27/2024  Room: 03/03  Time Calculation  Start Time: 1357  Stop Time: 1411  Time Calculation (min): 14 min    Assessment  IP OT Assessment  OT Assessment: Imapired cognition, self-care, strnegth, balance, functional mobility.  Prognosis: Fair  Barriers to Discharge Home: Caregiver assistance, Cognition needs, Physical needs  Caregiver Assistance: Caregiver assistance needed per identified barriers - however, level of patient's required assistance exceeds assistance available at home  Cognition Needs: 24hr supervision for safety awareness needed, Cognition-related high falls risk, Insight of patient limited regarding functional ability/needs  Physical Needs: 24hr mobility assistance needed, 24hr ADL assistance needed  Evaluation/Treatment Tolerance: Treatment limited secondary to agitation  Medical Staff Made Aware: Yes  End of Session Communication: Bedside nurse  End of Session Patient Position: Bed, 4 rail up, Alarm on (B soft mitts in place, bed wedges, sitter present.)  Plan:  Inpatient Plan  Treatment Interventions: ADL retraining, Functional transfer training, UE strengthening/ROM, Endurance training, Cognitive reorientation, Neuromuscular reeducation, Fine motor coordination activities, Compensatory technique education  OT Frequency: 2 times per week  OT Discharge Recommendations: Moderate intensity level of continued care, 24 hr supervision due to cognition  Equipment Recommended upon Discharge:  (TBD)  OT Recommended Transfer Status: Maximum assist, Assist of 2  OT - OK to Discharge: Yes  OT Assessment  OT Assessment Results: Decreased ADL status, Decreased upper extremity strength, Decreased safe judgment during ADL, Decreased cognition, Decreased endurance, Decreased functional mobility, Decreased gross motor control  Prognosis: Fair  Evaluation/Treatment Tolerance: Treatment limited secondary to  agitation  Medical Staff Made Aware: Yes    Subjective   Current Problem:  1. Dissection of descending aorta (Multi)  Transthoracic Echo (TTE) Complete    Transthoracic Echo (TTE) Complete        General:  Reason for Referral: 78 y/o male inititally presented to ED for evaluation of LLE laceration and being transferred for type B dissection.  Past Medical History Relevant to Rehab: dementia, DDD, HTN, DLD, recurrent falls  Co-Treatment: PT  Co-Treatment Reason: To maximize pt safety, AMPAC <10.  Prior to Session Communication: Bedside nurse  Patient Position Received: Bed, 4 rail up, Alarm on (Soft mitts in place, sitter present, bed wedges in place.)  Family/Caregiver Present: No  General Comment: Pt confused and difficulty following commands. Unable to obtain any PLOF info. Intermittently trying to punch and yelling out. Able to participate with bed mobility and stand with overall MAX A. (Cardene 5)   Precautions:  Medical Precautions: Cardiac precautions, Fall precautions  Precautions Comment: HR<60, SBP<120  Vital Signs:  Vital Signs (Past 2hrs)        Date/Time Vitals Session Patient Position Pulse Resp SpO2 BP MAP (mmHg)    12/27/24 1357 Pre OT  Lying  65  18  95 %  135/58  78     12/27/24 1408 Post OT  Lying  59  23  99 %  109/32  50                   Pain:  Pain Assessment  Pain Assessment: CPOT (Critical Care Pain Observation Tool)  0-10 (Numeric) Pain Score: 0 - No pain  Lines/Tubes/Drains:  Arterial Line 12/26/24 Left (Active)   Number of days: 0       External Urinary Catheter (Active)   Number of days: 1         Objective   Cognition:  Overall Cognitive Status: Impaired at baseline  Arousal/Alertness: Inconsistent responses to stimuli  Orientation Level:  (Confused. Unable to answer orientation questions.)  Following Commands:  (<10% command following)  Cognition Comments: Pt is confused at baseline. Combative at times. Poor command following.        Panchal Agitation Sedation Scale  Panchal Agitation  Sedation Scale (RASS): Agitated  Home Living:  Home Living Comments: Unable to attain from pt. Per chart review, appears that pt from home with wife.   Prior Function:  Prior Function Comments: Unable to attain from pt. Per chart review, pt primarily w/c bound at baseline.  IADL History:     ADL:  Grooming Assistance: Total  Bathing Assistance: Total  UE Dressing Assistance: Total  LE Dressing Assistance: Total  Toileting Assistance with Device: Total  Activity Tolerance:  Endurance: Decreased tolerance for upright activites  Early Mobility/Exercise Safety Screen: Proceed with mobilization - No exclusion criteria met  Balance:  Static Sitting Balance  Static Sitting-Comment/Number of Minutes: Static EOB sit for approx 8min with overall CGA. Occasional retro lean with MIN A to correct.  Bed Mobility/Transfers: Bed Mobility  Bed Mobility: Yes  Bed Mobility 1  Bed Mobility 1: Supine to sitting  Level of Assistance 1: Moderate assistance, +2  Bed Mobility Comments 1: Tactile cues  Bed Mobility 2  Bed Mobility  2: Sitting to supine  Level of Assistance 2: Dependent, +2   and Transfers  Transfer: Yes  Transfer 1  Transfer From 1: Sit to  Transfer to 1: Stand  Technique 1: Sit to stand, Stand to sit  Transfer Level of Assistance 1: Minimum assistance, +2, Arm in arm assistance  Trials/Comments 1: Completed for 2 trials. During stand, able to maintain short duration stand of <20sec each with MOD A x2 arm in arm.  IADL's:      Vision:     and Vision - Complex Assessment  Vision Comments: Difficult to assess. Appears intact.  Sensation:  Sensation Comment: Difficult to assess. Appears intact to touch.  Strength:  Strength Comments: Not formally assessed d/t cognition. Observed able to move BUE against gravity.  Perception:  Inattention/Neglect: Appears intact  Coordination:  Movements are Fluid and Coordinated: No   Hand Function:  Hand Function  Gross Grasp: Impaired  Coordination: Impaired  Extremities:  ,  ,  , and       Outcome Measures: [facility] Daily Activity  Putting on and taking off regular lower body clothing: Total  Bathing (including washing, rinsing, drying): Total  Putting on and taking off regular upper body clothing: Total  Toileting, which includes using toilet, bedpan or urinal: Total  Taking care of personal grooming such as brushing teeth: Total  Eating Meals: Total  Daily Activity - Total Score: 6    Confusion Assessment Method-ICU (CAM-ICU)  Feature 3: Altered Level of Consciousness: Positive   ICU Mobility Screen  Early Mobility/Exercise Safety Screen: Proceed with mobilization - No exclusion criteria met,   Panchal Agitation Sedation Scale  Panchal Agitation Sedation Scale (RASS): Agitated      Education Documentation  Body Mechanics, taught by Maryam Trinidad OT at 12/27/2024  3:42 PM.  Learner: Patient  Readiness: Acceptance  Method: Explanation  Response: Needs Reinforcement    Precautions, taught by Maryam Trinidad OT at 12/27/2024  3:42 PM.  Learner: Patient  Readiness: Acceptance  Method: Explanation  Response: Needs Reinforcement    ADL Training, taught by Maryam Trinidad OT at 12/27/2024  3:42 PM.  Learner: Patient  Readiness: Acceptance  Method: Explanation  Response: Needs Reinforcement    Education Comments  No comments found.        Goals:     Encounter Problems       Encounter Problems (Active)       ADLs       Patient will complete grooming tasks with CGA in order to maximize functional Indep with task completion.        Start:  12/27/24    Expected End:  01/10/25               BALANCE       Pt will increase static/dynamic sit/stand to Good to increase safety and indep with functional task completion.         Start:  12/27/24    Expected End:  01/10/25               COGNITION/SAFETY       Pt will follow simple, one step command accurately 50% of the time throughout duration of treatment, demonstrating improved cognition for participation in functional tasks.        Start:  12/27/24     Expected End:  01/10/25               EXERCISE/STRENGTHENING       Pt will increase overall BUE strength to 4/5 in order to increase functional task participation.        Start:  12/27/24    Expected End:  01/10/25            Pt will increase endurance to tolerate 15-20min of activity with no more than 1 rest break in order to increase ability to engage in ADL completion.        Start:  12/27/24    Expected End:  01/10/25               TRANSFERS       Patient will complete functional transfers using least restrictive device with  CGA  in order to maximize functional potential and increase safety.        Start:  12/27/24    Expected End:  01/10/25                   12/27/24 at 3:43 PM   Maryam Trinidad, OT   Rehab Office: [phone]

## 2024-12-27 NOTE — PROGRESS NOTES
"  VASCULAR SURGERY PROGRESS NOTE  Assessment/Plan   Elian Betancourt is 79 y.o. 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     Remains asymptomatic.    Plan:  Continue impulse control  Monitor for signs of malperfusion    D/w attending, Dr. Nando Villafuerte MD  PGY-5 Vascular Surgery Resident      Subjective   Agitated overnight. No chest or back pain. Remained on nicardipine gtt    Objective   Vitals:  Heart Rate:  []   Temp:  [35.9 °C (96.6 °F)-36.4 °C (97.6 °F)]   Resp:  [11-20]   BP: ()/(38-74)   Height:  [175.3 cm (5' 9\")]   Weight:  [70.3 kg (155 lb)-72.6 kg (160 lb 0.9 oz)]   SpO2:  [95 %-100 %]     Exam:  Constitutional: No acute distress, resting comfortably  Neuro:  AOx0, following commands  CV: no tachycardia  Pulm: non-labored on nasal canula  GI: soft, non-tender, non-distended  Musculoskeletal: moving all extremities  Extremities: no edema  Pulse Exam: palpable bilateral DP pulses    Labs:  Results from last 7 days   Lab Units 12/27/24  0423 12/26/24  1459 12/26/24  0720   WBC AUTO x10*3/uL 5.4 10.9 8.7   HEMOGLOBIN g/dL 8.2* 10.0* 11.5*   PLATELETS AUTO x10*3/uL 118* 136* 179      Results from last 7 days   Lab Units 12/27/24  0423 12/26/24  1459 12/26/24  0720 12/26/24  0720   SODIUM mmol/L 142 142  --  139   POTASSIUM mmol/L 4.0 4.3  --  3.8   CHLORIDE mmol/L 111* 109*  --  105   CO2 mmol/L 27 27  --  26   BUN mg/dL 20 23  --  23   CREATININE mg/dL 0.71 0.80  --  1.02   GLUCOSE mg/dL 88 95  --  202*   MAGNESIUM mg/dL 1.78 1.76   < >  --    PHOSPHORUS mg/dL  --  3.6  --   --     < > = values in this interval not displayed.      Results from last 7 days   Lab Units 12/26/24  0720   INR  1.2   PROTIME seconds 11.9            "

## 2024-12-27 NOTE — PROGRESS NOTES
Physical Therapy    Physical Therapy Evaluation    Patient Name: Elian Betancourt  MRN: [id number]  Department: [facility]  Room: 03/03-A  Today's Date: 12/27/2024   Time Calculation  Start Time: 1358  Stop Time: 1411  Time Calculation (min): 13 min    Assessment/Plan   PT Assessment  PT Assessment Results: Decreased strength, Decreased endurance, Impaired balance, Decreased mobility, Decreased cognition, Impaired judgement, Decreased safety awareness  Rehab Prognosis: Fair  Caregiver Assistance: Caregiver assistance needed per identified barriers - however, level of patient's required assistance exceeds assistance available at home  Cognition Needs: Insight of patient limited regarding functional ability/needs, Cognition-related high falls risk  Physical Needs: High falls risk due to function or environment, 24hr mobility assistance needed, 24hr ADL assistance needed  End of Session Communication: Bedside nurse  End of Session Patient Position: Bed, 4 rail up, Alarm off, caregiver present (sitter present)  IP OR SWING BED PT PLAN  Inpatient or Swing Bed: Inpatient  PT Plan  Treatment/Interventions: Bed mobility, Transfer training, Gait training, Strengthening, Endurance training, Therapeutic exercise, Therapeutic activity, Positioning  PT Plan: Ongoing PT  PT Frequency: 3 times per week  PT Discharge Recommendations: Moderate intensity level of continued care  PT Recommended Transfer Status: Assist x2  PT - OK to Discharge: Yes    Subjective     General Visit Information:  General  Reason for Referral: Presented to OSH for bleeding leg laceration; transferred to [facility] for type B dissection  Past Medical History Relevant to Rehab: dementia, DDD, HTN, DLD, recurrent falls  Family/Caregiver Present: No  Co-Treatment: OT  Co-Treatment Reason: to maximize mobility safely 2/2 severe dementia, agitated and aggressive with staff with AM-PAC </= 10  Prior to Session Communication: Bedside nurse  Patient Position Received: Bed, 4  rail up, Alarm off, caregiver present (sitter at bedside)  General Comment: Pt confused and with minimal command following 2/2 dementia. Intermittent attempts to punch and consistently telling therapists to let go of him. Did well with routine mobility.    Home Living:  Home Living  Home Living Comments: Unable to obtain at this time as pt too confused to answer questions and no family present. Per chart review- lives with wife.    Prior Level of Function:  Prior Function Per Pt/Caregiver Report  Prior Function Comments: Unable to obtain at this time as pt too confused to answer questions and no family present. Per chart review- primarily wheelchair bound at baseline.    Precautions:  Precautions  Medical Precautions: Cardiac precautions, Fall precautions  Precautions Comment: Impulse control: HR<60, SBP<120     Lines/Tubes:   Telemetry   Arterial line     Vital Signs     Vitals Session Pre PT During PT Post PT   Heart Rate 66  53   Resp 18  16   SpO2 95  98   /57  113/35     Objective     Pain:  Pain Assessment  Pain Assessment: 0-10  0-10 (Numeric) Pain Score:  (Pt did not complain of pain during session)    Cognition:  Cognition  Overall Cognitive Status: Impaired at baseline  Arousal/Alertness: Inconsistent responses to stimuli  Orientation Level:  (Pt confused and did not answer attempts at orientation questions. Provided pt with reorientation.)  Following Commands:  (Pt followed routine cues (ex. stand up, sit up) but did not follow most other cues.)  Cognition Comments: Confused at baseline. Combative at times.    General Assessments:   Activity Tolerance  Early Mobility/Exercise Safety Screen: Proceed with mobilization - No exclusion criteria met    Sensation  Sensation Comment: Unable to assess 2/2 confusion and minimal command following    Strength  Strength Comments: B LE strength >/= 3+/5 throughout  Strength  Strength Comments: B LE strength >/= 3+/5 throughout    Static Sitting Balance  Static  Sitting-Balance Support: No upper extremity supported, Feet supported  Static Sitting-Level of Assistance:  (assist ranged drastically 2/2 level of confusion- able to achieve CGA for 1-2 intervals at a time. At most- pt needed modAx1)  Dynamic Sitting Balance  Dynamic Sitting-Balance Support: No upper extremity supported, Feet supported  Dynamic Sitting-Level of Assistance:  (CGA-modAx1)    Static Standing Balance  Static Standing-Balance Support: Bilateral upper extremity supported (modAx2 for static standing)  Dynamic Standing Balance  Dynamic Standing-Balance Support:  (N/A)    Functional Assessments:  Bed Mobility  Bed Mobility: Yes  Bed Mobility 1  Bed Mobility 1: Supine to sitting  Level of Assistance 1: Moderate assistance (x2 person; cues for sequencing. Pt was able to move LE over to EOB with little assistance)  Bed Mobility Comments 1: HOB elevated  Bed Mobility 2  Bed Mobility  2: Sitting to supine  Level of Assistance 2: Dependent (x2 person as pt not cooperative with task)  Bed Mobility Comments 2: HOB flat    Transfers  Transfer: Yes  Transfer 1  Transfer From 1: Sit to  Transfer to 1: Stand  Technique 1: Sit to stand  Transfer Level of Assistance 1: Minimum assistance (x2 person; cues for sequencing; B arm-in-arm assist)  Trials/Comments 1: 2 trials  Transfers 2  Transfer From 2: Stand to  Transfer to 2: Sit  Technique 2: Stand to sit  Transfer Level of Assistance 2: Moderate assistance (x2 person; cues for sequencing; B arm-in-arm assist)  Trials/Comments 2: x 2 trials; decreased eccentric control    Extremity/Trunk Assessments:  RLE   RLE :  (tightness in hamstring)  LLE   LLE :  (Tightness in hamstring)    Outcome Measures:  [facility] Basic Mobility  Turning from your back to your side while in a flat bed without using bedrails: Total  Moving from lying on your back to sitting on the side of a flat bed without using bedrails: Total  Moving to and from bed to chair (including a wheelchair):  Total  Standing up from a chair using your arms (e.g. wheelchair or bedside chair): Total  To walk in hospital room: Total  Climbing 3-5 steps with railing: Total  Basic Mobility - Total Score: 6    Confusion Assessment Method-ICU (CAM-ICU)  Feature 1: Acute Onset or Fluctuating Course: Positive    FSS-ICU  Ambulation: Unable to attempt due to weakness  Rolling: Maximal assistance (performs 25% - 49% of task)  Sitting: Moderate assistance (performs 50 - 74% of task)  Transfer Sit-to-Stand: Total assistance (performs 25% or requires another person)  Transfer Supine-to-Sit: Total assistance (performs 25% or requires another person)  Total Score: 7    Early Mobility/Exercise Safety Screen: Proceed with mobilization - No exclusion criteria met  ICU Mobility Scale: Standing [4]  E = Exercise and Early Mobility  Early Mobility/Exercise Safety Screen: Proceed with mobilization - No exclusion criteria met  ICU Mobility Scale: Standing    Encounter Problems       Encounter Problems (Active)       Balance       Pt will be able to sit at EOB with/without UE assist >10 minutes and complete dynamic tasks with supervision only (Progressing)       Start:  12/27/24    Expected End:  01/10/25               Mobility       Patient will ambulate >15 ft with LRAD and modAx1 (Progressing)       Start:  12/27/24    Expected End:  01/10/25               PT Transfers       Transfer from bed to chair with LRAD and modAx1 (Progressing)       Start:  12/27/24    Expected End:  01/10/25            Patient to transfer to and from sit to supine with minAx1 (Progressing)       Start:  12/27/24    Expected End:  01/10/25            Patient will transfer sit to and from stand with LRAD and minAx1 (Progressing)       Start:  12/27/24    Expected End:  01/10/25               Pain - Adult            Education Documentation  Mobility Training, taught by Velma Heller, PT at 12/27/2024  4:39 PM.  Learner: Patient  Readiness: Acceptance  Method:  Explanation  Response: Needs Reinforcement  Comment: mobility precautions, reorientation    Education Comments  No comments found.    Signed by Velma Heller DPT

## 2024-12-27 NOTE — PROGRESS NOTES
"Pharmacy Medication History Review    Elian Betancourt is a 79 y.o. male admitted for Dissection of descending aorta (Multi). Pharmacy reviewed the patient's [id number] medications and allergies for accuracy.    Medications ADDED:  None   Medications CHANGED:  None   Medications REMOVED:   Docusate 100 mg   Ibuprofen 200 mg      The list below reflects the updated PTA list.   None        The list below reflects the updated allergy list. Please review each documented allergy for additional clarification and justification.  Allergies  Reviewed by Giovanna Chow RN on 12/26/2024   No Known Allergies         Patient accepts M2B at discharge.     Sources:   Patient interview - not attempted. Previously documented as a poor historian 2/2 dementia.   Per wife - no medications   Dispense history - none   OARRS - no significant history    10/17/2024 PCP visit     Additional Comments:  None       Niraj Shepherd, PharmD  Transitions of Care Pharmacist  12/27/24 1:17 PM     Secure Chat preferred   If no response call [id number] or [facility] [employer] \"Med Rec\"   "

## 2024-12-27 NOTE — PROGRESS NOTES
"  VASCULAR SURGERY PROGRESS NOTE  Assessment/Plan   Elian Betancourt is 79 y.o. 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     Remains asymptomatic.    Plan:  Continue impulse control  Monitor for signs of malperfusion    D/w attending, Dr. Nando Villafuerte MD  PGY-5 Vascular Surgery Resident      Subjective   Agitated overnight. No chest or back pain. Remained on nicardipine gtt    Objective   Vitals:  Heart Rate:  []   Temp:  [35.9 °C (96.6 °F)-36.4 °C (97.6 °F)]   Resp:  [11-20]   BP: ()/(38-74)   Height:  [175.3 cm (5' 9\")]   Weight:  [70.3 kg (155 lb)-72.6 kg (160 lb 0.9 oz)]   SpO2:  [95 %-100 %]     Exam:  Constitutional: No acute distress, resting comfortably  Neuro:  AOx0, following commands  CV: no tachycardia  Pulm: non-labored on nasal canula  GI: soft, non-tender, non-distended  Musculoskeletal: moving all extremities  Extremities: no edema  Pulse Exam: palpable bilateral DP pulses    Labs:  Results from last 7 days   Lab Units 12/27/24  0423 12/26/24  1459 12/26/24  0720   WBC AUTO x10*3/uL 5.4 10.9 8.7   HEMOGLOBIN g/dL 8.2* 10.0* 11.5*   PLATELETS AUTO x10*3/uL 118* 136* 179      Results from last 7 days   Lab Units 12/27/24  0423 12/26/24  1459 12/26/24  0720 12/26/24  0720   SODIUM mmol/L 142 142  --  139   POTASSIUM mmol/L 4.0 4.3  --  3.8   CHLORIDE mmol/L 111* 109*  --  105   CO2 mmol/L 27 27  --  26   BUN mg/dL 20 23  --  23   CREATININE mg/dL 0.71 0.80  --  1.02   GLUCOSE mg/dL 88 95  --  202*   MAGNESIUM mg/dL 1.78 1.76   < >  --    PHOSPHORUS mg/dL  --  3.6  --   --     < > = values in this interval not displayed.      Results from last 7 days   Lab Units 12/26/24  0720   INR  1.2   PROTIME seconds 11.9            "

## 2024-12-27 NOTE — CONSULTS
Reason For Consult  Type B aortic Dissection    History Of Present Illness  Elian Betancourt is a 79 y.o. male w/ PMH HTN, BPH, scoliosis, recurrent falls presenting with leg laceration to [facility] ED. Chest X-ray showed possible tortuous descending aorta. CTA showed type B aortic dissection for which Cardiac surgery were consulted.    - History is limited due to patient dementia  - Denies chest pain, shortness of breath, abdominal pain or any active complaints     Past Medical History  He has a past medical history of Abdominal pain (03/08/2024), Allergic rhinitis, Arthritis, Back pain, BPH (benign prostatic hyperplasia), Cellulitis of left lower limb (04/26/2023), Chronic maxillary sinusitis (09/17/2023), Closed fracture of acromial end of clavicle (04/09/2024), Contusion of face (03/08/2024), DDD (degenerative disc disease), lumbar, DDD (degenerative disc disease), thoracic, Difficulty in walking, not elsewhere classified (04/26/2023), Dysfunction of eustachian tube (09/17/2023), Epistaxis (09/17/2023), Facial laceration (03/08/2024), Hiatal hernia, HTN (hypertension), Hypercholesteremia, Injury of back (09/17/2023), Leukopenia, Muscle weakness (generalized) (04/26/2023), Nasal discharge (09/17/2023), Pain of left shoulder region (04/09/2024), Personal history of other specified conditions (06/23/2014), Polyp of nasal cavity (03/08/2024), Repeated falls (04/26/2023), Scoliosis (04/26/2023), Varicose veins of both legs with edema, and Weakness (04/25/2023).    Surgical History  He has a past surgical history that includes Myringotomy w/ tubes (10/30/2013); Tonsillectomy (10/30/2013); and Septoplasty (09/16/2014).     Social History  He reports that he has never smoked. He has never been exposed to tobacco smoke. He has never used smokeless tobacco. He reports current alcohol use of about 3.0 standard drinks of alcohol per week. He reports that he does not use drugs.    Family History  Family History   Problem Relation  Name Age of Onset    Hypertension Mother      Dementia Mother      Lung cancer Father      Stroke Other Grandfather         Allergies  Patient has no known allergies.    Review of Systems  Negative except for HPI     Physical Exam  /65   Pulse 78   Resp 18   Wt 72.6 kg (160 lb)   SpO2 98%   BMI 23.63 kg/m²     General Appearance:    Alert, cooperative, no distress, appears stated age   Lungs:     Clear to auscultation bilaterally, respirations unlabored   Heart:    Regular rate and rhythm, S1 and S2 normal, no murmur, rub    or gallop   Abdomen:     Soft, non-tender, bowel sounds active   Extremities:   Extremities normal, atraumatic, no cyanosis or edema   Pulses:   2+ and symmetric all extremities   Skin:   Skin color, texture, turgor normal, no rashes or lesions          Last Recorded Vitals  Blood pressure 122/65, pulse 78, resp. rate 18, weight 72.6 kg (160 lb), SpO2 98%.    Relevant Results  CTA 12/26/2024    IMPRESSION:  Type B aortic dissection involving zones 3 through 9 with all of the  primary arteries of the abdomen originating from the true lumen.  Severely displaced, subacute appearing left mid clavicular fracture.  Multiple left-sided nondisplaced subacute rib fractures. Pancreatic  tail multiloculated, macrocystic lesion which could represent  mucinous cystic neoplasm versus serous cystic neoplasm. Nonemergent,  outpatient MRCP could better evaluate if clinically indicated. Other  minor findings as above.           Assessment/Plan   Elian Betancourt is a 79 y.o. male w/ PMH HTN, BPH, scoliosis, recurrent falls presenting with leg laceration to [facility] ED. Chest X-ray showed possible tortuous descending aorta. CTA showed type B aortic dissection for which Cardiac surgery were consulted.      Recommendations:  - Will continue to follow with vascular surgery in case of intervention in the future  - Agree with conservative management for now with impulse control      I spent 30 minutes in the  professional and overall care of this patient.    Case discussed with Dr. FAREED Quiles MD

## 2024-12-28 ENCOUNTER — APPOINTMENT (OUTPATIENT)
Dept: CARDIOLOGY | Facility: HOSPITAL | Age: 79
DRG: 300 | End: 2024-12-28
Payer: MEDICARE

## 2024-12-28 PROBLEM — S89.92XA INJURY OF LEFT LOWER EXTREMITY: Status: ACTIVE | Noted: 2024-12-28

## 2024-12-28 PROBLEM — I10 HYPERTENSION: Chronic | Status: ACTIVE | Noted: 2023-09-17

## 2024-12-28 LAB
ALBUMIN SERPL BCP-MCNC: 2.3 G/DL (ref 3.4–5)
ALBUMIN SERPL BCP-MCNC: 2.6 G/DL (ref 3.4–5)
ALP SERPL-CCNC: 70 U/L (ref 33–136)
ALT SERPL W P-5'-P-CCNC: 18 U/L (ref 10–52)
ANION GAP SERPL CALC-SCNC: 7 MMOL/L (ref 10–20)
ANION GAP SERPL CALC-SCNC: 9 MMOL/L (ref 10–20)
AST SERPL W P-5'-P-CCNC: 29 U/L (ref 9–39)
BASOPHILS # BLD AUTO: 0.02 X10*3/UL (ref 0–0.1)
BASOPHILS # BLD AUTO: 0.02 X10*3/UL (ref 0–0.1)
BASOPHILS NFR BLD AUTO: 0.4 %
BASOPHILS NFR BLD AUTO: 0.5 %
BILIRUB SERPL-MCNC: 0.5 MG/DL (ref 0–1.2)
BUN SERPL-MCNC: 22 MG/DL (ref 6–23)
BUN SERPL-MCNC: 24 MG/DL (ref 6–23)
CALCIUM SERPL-MCNC: 7.9 MG/DL (ref 8.6–10.6)
CALCIUM SERPL-MCNC: 8.3 MG/DL (ref 8.6–10.6)
CHLORIDE SERPL-SCNC: 106 MMOL/L (ref 98–107)
CHLORIDE SERPL-SCNC: 108 MMOL/L (ref 98–107)
CO2 SERPL-SCNC: 27 MMOL/L (ref 21–32)
CO2 SERPL-SCNC: 27 MMOL/L (ref 21–32)
CREAT SERPL-MCNC: 0.76 MG/DL (ref 0.5–1.3)
CREAT SERPL-MCNC: 0.78 MG/DL (ref 0.5–1.3)
EGFRCR SERPLBLD CKD-EPI 2021: 90 ML/MIN/1.73M*2
EGFRCR SERPLBLD CKD-EPI 2021: >90 ML/MIN/1.73M*2
EOSINOPHIL # BLD AUTO: 0.09 X10*3/UL (ref 0–0.4)
EOSINOPHIL # BLD AUTO: 0.11 X10*3/UL (ref 0–0.4)
EOSINOPHIL NFR BLD AUTO: 1.9 %
EOSINOPHIL NFR BLD AUTO: 2.7 %
ERYTHROCYTE [DISTWIDTH] IN BLOOD BY AUTOMATED COUNT: 13.3 % (ref 11.5–14.5)
ERYTHROCYTE [DISTWIDTH] IN BLOOD BY AUTOMATED COUNT: 13.7 % (ref 11.5–14.5)
ERYTHROCYTE [DISTWIDTH] IN BLOOD BY AUTOMATED COUNT: 13.8 % (ref 11.5–14.5)
FOLATE SERPL-MCNC: 15.8 NG/ML
GLUCOSE SERPL-MCNC: 102 MG/DL (ref 74–99)
GLUCOSE SERPL-MCNC: 140 MG/DL (ref 74–99)
HAPTOGLOB SERPL NEPH-MCNC: 129 MG/DL (ref 30–200)
HCT VFR BLD AUTO: 21.6 % (ref 41–52)
HCT VFR BLD AUTO: 24 % (ref 41–52)
HCT VFR BLD AUTO: 26.7 % (ref 41–52)
HGB BLD-MCNC: 7.6 G/DL (ref 13.5–17.5)
HGB BLD-MCNC: 8 G/DL (ref 13.5–17.5)
HGB BLD-MCNC: 8.7 G/DL (ref 13.5–17.5)
IMM GRANULOCYTES # BLD AUTO: 0.01 X10*3/UL (ref 0–0.5)
IMM GRANULOCYTES # BLD AUTO: 0.01 X10*3/UL (ref 0–0.5)
IMM GRANULOCYTES NFR BLD AUTO: 0.2 % (ref 0–0.9)
IMM GRANULOCYTES NFR BLD AUTO: 0.2 % (ref 0–0.9)
LDH SERPL L TO P-CCNC: 178 U/L (ref 84–246)
LYMPHOCYTES # BLD AUTO: 0.63 X10*3/UL (ref 0.8–3)
LYMPHOCYTES # BLD AUTO: 0.66 X10*3/UL (ref 0.8–3)
LYMPHOCYTES NFR BLD AUTO: 13.8 %
LYMPHOCYTES NFR BLD AUTO: 15.3 %
MAGNESIUM SERPL-MCNC: 1.91 MG/DL (ref 1.6–2.4)
MAGNESIUM SERPL-MCNC: 1.94 MG/DL (ref 1.6–2.4)
MCH RBC QN AUTO: 31 PG (ref 26–34)
MCH RBC QN AUTO: 31.9 PG (ref 26–34)
MCH RBC QN AUTO: 32 PG (ref 26–34)
MCHC RBC AUTO-ENTMCNC: 32.6 G/DL (ref 32–36)
MCHC RBC AUTO-ENTMCNC: 33.3 G/DL (ref 32–36)
MCHC RBC AUTO-ENTMCNC: 35.2 G/DL (ref 32–36)
MCV RBC AUTO: 91 FL (ref 80–100)
MCV RBC AUTO: 95 FL (ref 80–100)
MCV RBC AUTO: 96 FL (ref 80–100)
MONOCYTES # BLD AUTO: 0.44 X10*3/UL (ref 0.05–0.8)
MONOCYTES # BLD AUTO: 0.53 X10*3/UL (ref 0.05–0.8)
MONOCYTES NFR BLD AUTO: 10.7 %
MONOCYTES NFR BLD AUTO: 11.1 %
NEUTROPHILS # BLD AUTO: 2.91 X10*3/UL (ref 1.6–5.5)
NEUTROPHILS # BLD AUTO: 3.46 X10*3/UL (ref 1.6–5.5)
NEUTROPHILS NFR BLD AUTO: 70.6 %
NEUTROPHILS NFR BLD AUTO: 72.6 %
NRBC BLD-RTO: 0 /100 WBCS (ref 0–0)
PHOSPHATE SERPL-MCNC: 3.3 MG/DL (ref 2.5–4.9)
PLATELET # BLD AUTO: 120 X10*3/UL (ref 150–450)
PLATELET # BLD AUTO: 121 X10*3/UL (ref 150–450)
PLATELET # BLD AUTO: 139 X10*3/UL (ref 150–450)
POTASSIUM SERPL-SCNC: 4 MMOL/L (ref 3.5–5.3)
POTASSIUM SERPL-SCNC: 4.2 MMOL/L (ref 3.5–5.3)
PROT SERPL-MCNC: 4.1 G/DL (ref 6.4–8.2)
RBC # BLD AUTO: 2.38 X10*6/UL (ref 4.5–5.9)
RBC # BLD AUTO: 2.5 X10*6/UL (ref 4.5–5.9)
RBC # BLD AUTO: 2.81 X10*6/UL (ref 4.5–5.9)
SODIUM SERPL-SCNC: 138 MMOL/L (ref 136–145)
SODIUM SERPL-SCNC: 138 MMOL/L (ref 136–145)
VIT B12 SERPL-MCNC: 291 PG/ML (ref 211–911)
WBC # BLD AUTO: 4.1 X10*3/UL (ref 4.4–11.3)
WBC # BLD AUTO: 4.7 X10*3/UL (ref 4.4–11.3)
WBC # BLD AUTO: 4.8 X10*3/UL (ref 4.4–11.3)

## 2024-12-28 PROCEDURE — 80053 COMPREHEN METABOLIC PANEL: CPT

## 2024-12-28 PROCEDURE — 2500000004 HC RX 250 GENERAL PHARMACY W/ HCPCS (ALT 636 FOR OP/ED): Performed by: NURSE PRACTITIONER

## 2024-12-28 PROCEDURE — 83615 LACTATE (LD) (LDH) ENZYME: CPT

## 2024-12-28 PROCEDURE — 36415 COLL VENOUS BLD VENIPUNCTURE: CPT

## 2024-12-28 PROCEDURE — 85027 COMPLETE CBC AUTOMATED: CPT | Performed by: NURSE PRACTITIONER

## 2024-12-28 PROCEDURE — 93005 ELECTROCARDIOGRAM TRACING: CPT

## 2024-12-28 PROCEDURE — 83735 ASSAY OF MAGNESIUM: CPT | Performed by: NURSE PRACTITIONER

## 2024-12-28 PROCEDURE — 99291 CRITICAL CARE FIRST HOUR: CPT

## 2024-12-28 PROCEDURE — 36415 COLL VENOUS BLD VENIPUNCTURE: CPT | Performed by: NURSE PRACTITIONER

## 2024-12-28 PROCEDURE — 83735 ASSAY OF MAGNESIUM: CPT

## 2024-12-28 PROCEDURE — 2500000001 HC RX 250 WO HCPCS SELF ADMINISTERED DRUGS (ALT 637 FOR MEDICARE OP)

## 2024-12-28 PROCEDURE — 85025 COMPLETE CBC W/AUTO DIFF WBC: CPT | Performed by: INTERNAL MEDICINE

## 2024-12-28 PROCEDURE — 80069 RENAL FUNCTION PANEL: CPT | Mod: CCI | Performed by: NURSE PRACTITIONER

## 2024-12-28 PROCEDURE — 2500000001 HC RX 250 WO HCPCS SELF ADMINISTERED DRUGS (ALT 637 FOR MEDICARE OP): Performed by: NURSE PRACTITIONER

## 2024-12-28 PROCEDURE — 83010 ASSAY OF HAPTOGLOBIN QUANT: CPT

## 2024-12-28 PROCEDURE — 37799 UNLISTED PX VASCULAR SURGERY: CPT | Performed by: INTERNAL MEDICINE

## 2024-12-28 PROCEDURE — 93010 ELECTROCARDIOGRAM REPORT: CPT | Performed by: INTERNAL MEDICINE

## 2024-12-28 PROCEDURE — 85025 COMPLETE CBC W/AUTO DIFF WBC: CPT

## 2024-12-28 PROCEDURE — 1100000001 HC PRIVATE ROOM DAILY

## 2024-12-28 RX ORDER — ATROPINE SULFATE 0.1 MG/ML
INJECTION INTRAVENOUS
Status: DISPENSED
Start: 2024-12-28 | End: 2024-12-28

## 2024-12-28 RX ORDER — METOPROLOL SUCCINATE 25 MG/1
12.5 TABLET, EXTENDED RELEASE ORAL DAILY
Status: DISCONTINUED | OUTPATIENT
Start: 2024-12-28 | End: 2025-01-01

## 2024-12-28 RX ORDER — CAPTOPRIL 12.5 MG/1
12.5 TABLET ORAL 3 TIMES DAILY
Status: DISCONTINUED | OUTPATIENT
Start: 2024-12-28 | End: 2024-12-30

## 2024-12-28 RX ADMIN — METOPROLOL SUCCINATE 12.5 MG: 25 TABLET, EXTENDED RELEASE ORAL at 13:22

## 2024-12-28 RX ADMIN — DOCUSATE SODIUM 100 MG: 100 CAPSULE, LIQUID FILLED ORAL at 05:34

## 2024-12-28 RX ADMIN — CAPTOPRIL 12.5 MG: 12.5 TABLET ORAL at 21:38

## 2024-12-28 RX ADMIN — ENOXAPARIN SODIUM 40 MG: 100 INJECTION SUBCUTANEOUS at 17:58

## 2024-12-28 RX ADMIN — CAPTOPRIL 12.5 MG: 12.5 TABLET ORAL at 17:58

## 2024-12-28 ASSESSMENT — COGNITIVE AND FUNCTIONAL STATUS - GENERAL
MOVING TO AND FROM BED TO CHAIR: A LOT
TURNING FROM BACK TO SIDE WHILE IN FLAT BAD: A LOT
CLIMB 3 TO 5 STEPS WITH RAILING: A LOT
MOVING FROM LYING ON BACK TO SITTING ON SIDE OF FLAT BED WITH BEDRAILS: A LOT
PERSONAL GROOMING: A LOT
TOILETING: A LOT
MOVING FROM LYING ON BACK TO SITTING ON SIDE OF FLAT BED WITH BEDRAILS: A LOT
MOBILITY SCORE: 12
HELP NEEDED FOR BATHING: A LITTLE
EATING MEALS: A LITTLE
WALKING IN HOSPITAL ROOM: A LOT
STANDING UP FROM CHAIR USING ARMS: A LOT
TURNING FROM BACK TO SIDE WHILE IN FLAT BAD: A LOT
PERSONAL GROOMING: A LOT
DAILY ACTIVITIY SCORE: 15
DRESSING REGULAR LOWER BODY CLOTHING: A LOT
MOVING TO AND FROM BED TO CHAIR: A LOT
MOBILITY SCORE: 12
EATING MEALS: A LITTLE
HELP NEEDED FOR BATHING: A LOT
WALKING IN HOSPITAL ROOM: A LOT
TOILETING: A LOT
DAILY ACTIVITIY SCORE: 13
DRESSING REGULAR UPPER BODY CLOTHING: A LOT
DRESSING REGULAR UPPER BODY CLOTHING: A LOT
STANDING UP FROM CHAIR USING ARMS: A LOT
DRESSING REGULAR LOWER BODY CLOTHING: A LITTLE
CLIMB 3 TO 5 STEPS WITH RAILING: A LOT

## 2024-12-28 ASSESSMENT — PAIN SCALES - GENERAL
PAINLEVEL_OUTOF10: 0 - NO PAIN

## 2024-12-28 NOTE — PROGRESS NOTES
VASCULAR SURGERY PROGRESS NOTE  Assessment/Plan   Elian Betancourt is 79 y.o. 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     Remains asymptomatic.    Plan:  Continue impulse control  Monitor for signs of malperfusion  Please contact vascular surgery with any questions or concerns    D/w attending, Dr. Jose Alberto Davis MD  Vascular Surgery [phone] or Epic Chat      Subjective   No adverse events overnight. No chest or back pain. Denies abdominal pain, abdomen is soft and nontender. Still on nicardipine gtt.     Objective   Vitals:  Heart Rate:  []   Temp:  [36 °C (96.8 °F)-36.6 °C (97.9 °F)]   Resp:  [12-23]   BP: ()/(32-58)   Weight:  [78 kg (171 lb 15.3 oz)]   SpO2:  [95 %-100 %]     Exam:  Constitutional: No acute distress, resting comfortably  Neuro:  AOx0, following commands  CV: no tachycardia  Pulm: non-labored on nasal canula  GI: soft, non-tender, non-distended  Musculoskeletal: moving all extremities  Extremities: no edema  Pulse Exam: palpable bilateral DP pulses    Labs:  Results from last 7 days   Lab Units 12/28/24  0526 12/28/24  0244 12/27/24  0423   WBC AUTO x10*3/uL 4.8 4.1* 5.4   HEMOGLOBIN g/dL 8.0* 7.6* 8.2*   PLATELETS AUTO x10*3/uL 121* 120* 118*      Results from last 7 days   Lab Units 12/28/24  0244 12/27/24  0423 12/26/24  1459   SODIUM mmol/L 138 142 142   POTASSIUM mmol/L 4.2 4.0 4.3   CHLORIDE mmol/L 108* 111* 109*   CO2 mmol/L 27 27 27   BUN mg/dL 24* 20 23   CREATININE mg/dL 0.78 0.71 0.80   GLUCOSE mg/dL 102* 88 95   MAGNESIUM mg/dL 1.91 1.78 1.76   PHOSPHORUS mg/dL  --   --  3.6      Results from last 7 days   Lab Units 12/26/24  0720   INR  1.2   PROTIME seconds 11.9

## 2024-12-28 NOTE — CARE PLAN
Problem: Skin  Goal: Decreased wound size/increased tissue granulation at next dressing change  Outcome: Progressing  Flowsheets (Taken 12/27/2024 2342)  Decreased wound size/increased tissue granulation at next dressing change: Promote sleep for wound healing  Goal: Participates in plan/prevention/treatment measures  Outcome: Progressing  Flowsheets (Taken 12/27/2024 2342)  Participates in plan/prevention/treatment measures:   Elevate heels   Discuss with provider PT/OT consult  Goal: Prevent/manage excess moisture  Outcome: Progressing  Flowsheets (Taken 12/27/2024 2342)  Prevent/manage excess moisture:   Monitor for/manage infection if present   Cleanse incontinence/protect with barrier cream   Follow provider orders for dressing changes  Goal: Prevent/minimize sheer/friction injuries  Outcome: Progressing  Flowsheets (Taken 12/27/2024 2342)  Prevent/minimize sheer/friction injuries:   HOB 30 degrees or less   Use pull sheet  Goal: Promote/optimize nutrition  Outcome: Progressing  Flowsheets (Taken 12/27/2024 2342)  Promote/optimize nutrition: Monitor/record intake including meals  Goal: Promote skin healing  Outcome: Progressing  Flowsheets (Taken 12/27/2024 2342)  Promote skin healing:   Protective dressings over bony prominences   Rotate device position/do not position patient on device   Assess skin/pad under line(s)/device(s)     Problem: Safety - Adult  Goal: Free from fall injury  Outcome: Progressing   The patient's goals for the shift include      The clinical goals for the shift include pt will remain HDS with goal of sbp <120 and hr <60    Over the shift, the patient did make progress toward the following goals.

## 2024-12-28 NOTE — SIGNIFICANT EVENT
Patient to tele floor from CICU    Mr. Betancourt is a 79M with PMHx of dementia, DDD, HTN, DLD, recurrent falls who presents to [facility] ED for evaluation of LLE laceration after fall incidentally found to have type B aortic dissection. Transferred to  CICU for further evaluation of dissection. CTA CAP showing type B dissection, bilateral renal arteries, celiac trunk, superior mesenteric artery and inferior mesenteric artery arise from the true lumen.     Updates 12/28  -Normocytic anemia of unclear etiology. Follow up folate, B12 (although would not cause acute drop), LDH and haptoglobin - all WNL. No overt signs of bleeding. Will CTM  -TTE 12/28: EF 63%, probable moderate aortic regurg  -Transitioned from metop tartrate 12.5 BID to metop succ 12.5 daily due to bradycardia and widening QRS  -Transfer to the floor    EXAM  Quiet man, son feed patient per nurse.   Full care by family  Stiff and rigid, opens eyes to name  Able to lift both legs with command weakly- lacerations noted  Arms pulled up stiff  Respirations easy and unlabored  Warm and dry    HVI aware of transfer to the service  Staff in the am

## 2024-12-28 NOTE — PROGRESS NOTES
Elian Betancourt is a 79 y.o. male on day 2 of admission presenting with Dissection of descending aorta (Multi).      Subjective   No concerns this morning.    Interval history: [place] overnight to 40s while sleeping.        Objective     Last Recorded Vitals  BP 93/57 (BP Location: Left arm, Patient Position: Lying)   Pulse (!) 44   Temp 36.1 °C (97 °F) (Temporal)   Resp 12   Wt 78 kg (171 lb 15.3 oz)   SpO2 99%   Intake/Output last 3 Shifts:    Intake/Output Summary (Last 24 hours) at 12/28/2024 0653  Last data filed at 12/27/2024 2300  Gross per 24 hour   Intake 396.46 ml   Output 700 ml   Net -303.54 ml       Admission Weight  Weight: 72.6 kg (160 lb) (12/26/24 1642)    Daily Weight  12/28/24 : 78 kg (171 lb 15.3 oz)    Image Results  Transthoracic Echo (TTE) Complete     [facility], [address] [place] [address]                 Tel [phone] and Fax [phone]    TRANSTHORACIC ECHOCARDIOGRAM REPORT       Patient Name:       ELIAN BETANCOURT       Reading Physician:    [id number] Mani Marcos MD  Study Date:         12/27/2024          Ordering Provider:    [id number] MARVIN GONG  MRN/PID:            [id number]            Fellow:               [id number] Moriah Jones MD  Accession#:         [id number]        Nurse:  Date of Birth/Age:  1945 / 79      Sonographer:          Claudette ibarra                                     SURAJ  Gender assigned at  M                   Additional Staff:  Birth:  Height:             175.26 cm           Admit Date:  Weight:             72.58 kg            Admission Status:     Inpatient -                                                                Routine  BSA / BMI:          1.88 m2 / 23.63     Encounter#:           [id number]                       kg/m2  Blood Pressure:     119/47 mmHg         Department Location:  [facility]    Study Type:    TRANSTHORACIC ECHO (TTE) COMPLETE  Diagnosis/ICD: Dissection of unspecified site of aorta-I71.00  Indication:    type B dissection  CPT Code:      Echo Complete w Full Doppler-21980    Patient History:  Pertinent History: HTN, recurrent falls, type b dissection.    Study Detail: The following Echo studies were performed: 2D, M-Mode, Doppler and                color flow. Definity used as a contrast agent for endocardial                border definition. Total contrast used for this procedure was 1 mL                via IV push. Unable to obtain subcostal view.       PHYSICIAN INTERPRETATION:  Left Ventricle: Left ventricular ejection fraction is normal, calculated by Sosa's biplane at 63%. There is concentric left ventricular hypertrophy. There are no regional left ventricular wall motion abnormalities. The left ventricular cavity size is normal. There is mildly increased septal and mildly increased posterior left ventricular wall thickness. There is left ventricular concentric remodeling. Left ventricular diastolic filling is indeterminate.  Left Atrium: The left atrium is normal in size.  Right Ventricle: The right ventricle is mildly enlarged. There is normal right ventricular global systolic function.  Right Atrium: The right atrium is mildly dilated.  Aortic Valve: The aortic valve is trileaflet. The aortic valve dimensionless index is 0.81. There is moderate aortic valve regurgitation. The peak instantaneous gradient of the aortic valve is 7 mmHg. The mean gradient of the aortic valve is 4 mmHg.  Mitral Valve: The mitral valve is normal in structure. There is trace mitral valve regurgitation.  Tricuspid Valve: The tricuspid valve is structurally normal. There is mild tricuspid regurgitation.  Pulmonic Valve: The pulmonic valve is structurally normal. There is trace pulmonic valve  regurgitation.  Pericardium: Small pericardial effusion.  Aorta: The aortic root is abnormal. There is mild dilatation of the ascending aorta.  In comparison to the previous echocardiogram(s): There are no prior studies on this patient for comparison purposes.       CONCLUSIONS:   1. Left ventricular ejection fraction is normal, calculated by Sosa's biplane at 63%.   2. Left ventricular diastolic filling is indeterminate.   3. There is normal right ventricular global systolic function.   4. Mildly enlarged right ventricle.   5. Poorly visualized aortic valve with probably moderate aortic valve regurgitation.    QUANTITATIVE DATA SUMMARY:     2D MEASUREMENTS:          Normal Ranges:  Ao Root d:       3.80 cm  (2.0-3.7cm)  LAs:             3.60 cm  (2.7-4.0cm)  IVSd:            1.30 cm  (0.6-1.1cm)  LVPWd:           1.34 cm  (0.6-1.1cm)  LVIDd:           4.10 cm  (3.9-5.9cm)  LVIDs:           2.80 cm  LV Mass Index:   105 g/m2  LVEDV Index:     78 ml/m2  LV % FS          31.7 %       RA VOLUME BY A/L METHOD:          Normal Ranges:  RA Area A4C:             20.3 cm2       AORTA MEASUREMENTS:         Normal Ranges:  Asc Ao, d:          3.90 cm (2.1-3.4cm)       LV SYSTOLIC FUNCTION BY 2D PLANIMETRY (MOD):                       Normal Ranges:  EF-A4C View:    62 % (>=55%)  EF-A2C View:    60 %  EF-Biplane:     63 %  LV EF Reported: 63 %       LV DIASTOLIC FUNCTION:             Normal Ranges:  MV Peak E:             0.60 m/s    (0.7-1.2 m/s)  MV Peak A:             0.83 m/s    (0.42-0.7 m/s)  E/A Ratio:             0.72        (1.0-2.2)  MV e'                  0.068 m/s   (>8.0)  MV lateral e'          0.08 m/s  MV medial e'           0.05 m/s  MV A Dur:              106.00 msec  E/e' Ratio:            8.76        (<8.0)       MITRAL VALVE:          Normal Ranges:  MV DT:        187 msec (150-240msec)       AORTIC VALVE:                     Normal Ranges:  AoV Vmax:                1.35 m/s (<=1.7m/s)  AoV Peak PG:              7.3 mmHg (<20mmHg)  AoV Mean P.0 mmHg (1.7-11.5mmHg)  LVOT Max Brian:            1.17 m/s (<=1.1m/s)  AoV VTI:                 28.00 cm (18-25cm)  LVOT VTI:                22.80 cm  LVOT Diameter:           2.40 cm  (1.8-2.4cm)  AoV Area, VTI:           3.68 cm2 (2.5-5.5cm2)  AoV Area,Vmax:           3.92 cm2 (2.5-4.5cm2)  AoV Dimensionless Index: 0.81       AORTIC INSUFFICIENCY:  AI Vmax:       3.62 m/s  AI Half-time:  641 msec  AI Decel Rate: 166.00 cm/s2       RIGHT VENTRICLE:  RV Basal 4.20 cm  RV Mid   3.60 cm  RV Major 8.4 cm  TAPSE:   25.8 mm  RV s'    0.15 m/s       TRICUSPID VALVE/RVSP:          Normal Ranges:  Peak TR Velocity:     2.32 m/s  RV Syst Pressure:     25 mmHg  (< 30mmHg)       PULMONIC VALVE:          Normal Ranges:  PV Accel Time:  156 msec (>120ms)  PV Max Brian:     1.2 m/s  (0.6-0.9m/s)  PV Max P.6 mmHg       [id number] Mani Marcos MD  Electronically signed on 2024 at 11:21:40 AM       ** Final **      Physical Exam  Physical Exam  General: NAD, appears comfortable  HEENT: NCAT, no scleral icterus, EOMI  Heart: RRR, no m/r/g  Lungs: CTAB, no rales/ronchi/wheezing  Abdomen: soft, NT, ND, +BS  Ext: no bl LE edema. Wounds on bilateral feet and LLE  Neuro: Aox0  Psych: not examined this morning as patient is sleeping    Relevant Results               Assessment/Plan              Assessment & Plan  Dissection of descending aorta (Multi)    Frailty    Mr. Betancourt is a 79M with PMHx of dementia, DDD, HTN, DLD, recurrent falls who presents to [facility] ED for evaluation of LLE laceration after fall incidentally found to have type B aortic dissection. Transferred to  CICU for further evaluation of dissection. CTA CAP showing type B dissection, bilateral renal arteries, celiac trunk, superior mesenteric artery and inferior mesenteric artery arise from the true lumen.     Updates   -Normocytic anemia of unclear etiology. Follow up folate, B12 (although would not  cause acute drop), LDH and haptoglobin - all WNL. No overt signs of bleeding. Will CTM  -TTE : EF 63%, probable moderate aortic regurg  -Transitioned from metop tartrate 12.5 BID to metop succ 12.5 daily due to bradycardia and widening QRS  -Transfer to the floor    To do:  -continue to titrate impulse control regimen. Will need to be transitioned to daily medications prior to discharge  -Continue to monitor anemia.  -Will need outpatient follow up for the followin.)Few scattered small pulmonary nodules measuring up to 7 mm along  the minor fissure. Non-contrast chest CT at 3-6 months is recommended.  2.) Multiple loculated cystic structure in the gastrosplenic ligament measuring 6.4 cm x 4.8 cm.   Recommend f/up multiphase contrast enhanced CT abdomen                         Neuro:  #Dementia   #Recurrent falls  ::CTH/C-spine- no evidence for an acute fracture or subluxation of the cervical spine. No acute intracranial pathology.   -Continue to monitor  -Delirium precautions     Cardiac:  #Type B Dissection  #HTN  ::CT Chest - Age-indeterminate type B aortic dissection extending from the mid aortic arch into the visualized portion of the abdominal aorta. There is associated mild dilatation of theproximal descending aorta measuring 3.9 cm. The celiac, superior mesenteric and bilateral renal arteries arise from the true lumen. The true lumen is mildly compressed by the false lumen.   ::CTA CAP - type B dissection, bilateral renal arteries, celiac trunk, superior mesenteric artery   and inferior mesenteric artery arise from the true lumen.   :: s/p nicardipine drip  :: TTE : EF 63%, probable moderate aortic regurg  -Vascular surgery consult  -Cardiac surgery consult (given dissection starts at aortic arch)  -Impulse control (HR<60, SBP<120)  -Start Metoprolol and captopril  -Serial abdominal exams and NV checks      Pulm:   #Pulmonary nodules   ::7mm nodules   -repeat CT in 3-6 months       Renal:  -WENDY     GI:  ::Multiple loculated cystic structure in the gastrosplenic ligament measuring 6.4 cm x 4.8 cm. Findings may be secondary to a pancreatic pseudocyst, although technically indeterminate   -Recommend f/up multiphase contrast enhanced CT abdomen      Heme/Onc:  ::Hgb ~13  ::Iron, ferritin WNL  -Hgb on admission 11.5-->10 -->8  -Maintain active T&S     MSK:  #Fall with LLE laceration  ::XR tib/fib LLE: Osteopenia. No acute fracture or dislocation.   -S/p lac repair by the ED     ID:  #C/f sepsis   -s/p Cefipime x1 at OSH ED. Hold off reordering abx d/t low c/f sepsis.   -f/up blood cultures 12/26 - NGTD     F: s/p 1.5L LR  E: PRN  N: cardiac  G: none  A: PIV  DVT: lovenox  CODE: DNR/DNI (confirmed with wife 12/26)    NOK: Wife Wendy [phone]        Luke Aguilar MD  Internal Medicine-Pediatrics PGY2  Epic Chat

## 2024-12-29 VITALS
HEART RATE: 62 BPM | TEMPERATURE: 98.6 F | BODY MASS INDEX: 23.6 KG/M2 | WEIGHT: 159.83 LBS | RESPIRATION RATE: 17 BRPM | SYSTOLIC BLOOD PRESSURE: 97 MMHG | DIASTOLIC BLOOD PRESSURE: 55 MMHG | OXYGEN SATURATION: 94 %

## 2024-12-29 LAB
ABO GROUP (TYPE) IN BLOOD: NORMAL
ALBUMIN SERPL BCP-MCNC: 2.4 G/DL (ref 3.4–5)
ANION GAP SERPL CALC-SCNC: 11 MMOL/L (ref 10–20)
ANION GAP SERPL CALC-SCNC: 8 MMOL/L (ref 10–20)
ANTIBODY SCREEN: NORMAL
BACTERIA BLD CULT: NORMAL
BACTERIA BLD CULT: NORMAL
BUN SERPL-MCNC: 19 MG/DL (ref 6–23)
BUN SERPL-MCNC: 20 MG/DL (ref 6–23)
CALCIUM SERPL-MCNC: 8.1 MG/DL (ref 8.6–10.6)
CALCIUM SERPL-MCNC: 8.4 MG/DL (ref 8.6–10.6)
CHLORIDE SERPL-SCNC: 108 MMOL/L (ref 98–107)
CHLORIDE SERPL-SCNC: 110 MMOL/L (ref 98–107)
CO2 SERPL-SCNC: 26 MMOL/L (ref 21–32)
CO2 SERPL-SCNC: 27 MMOL/L (ref 21–32)
CREAT SERPL-MCNC: 0.67 MG/DL (ref 0.5–1.3)
CREAT SERPL-MCNC: 0.79 MG/DL (ref 0.5–1.3)
EGFRCR SERPLBLD CKD-EPI 2021: 90 ML/MIN/1.73M*2
EGFRCR SERPLBLD CKD-EPI 2021: >90 ML/MIN/1.73M*2
ERYTHROCYTE [DISTWIDTH] IN BLOOD BY AUTOMATED COUNT: 13.3 % (ref 11.5–14.5)
GLUCOSE BLD MANUAL STRIP-MCNC: 122 MG/DL (ref 74–99)
GLUCOSE SERPL-MCNC: 103 MG/DL (ref 74–99)
GLUCOSE SERPL-MCNC: 127 MG/DL (ref 74–99)
HCT VFR BLD AUTO: 26.3 % (ref 41–52)
HGB BLD-MCNC: 8.6 G/DL (ref 13.5–17.5)
MAGNESIUM SERPL-MCNC: 1.78 MG/DL (ref 1.6–2.4)
MCH RBC QN AUTO: 31.5 PG (ref 26–34)
MCHC RBC AUTO-ENTMCNC: 32.7 G/DL (ref 32–36)
MCV RBC AUTO: 96 FL (ref 80–100)
NRBC BLD-RTO: 0 /100 WBCS (ref 0–0)
PHOSPHATE SERPL-MCNC: 3.1 MG/DL (ref 2.5–4.9)
PLATELET # BLD AUTO: 145 X10*3/UL (ref 150–450)
POTASSIUM SERPL-SCNC: 4 MMOL/L (ref 3.5–5.3)
POTASSIUM SERPL-SCNC: 4.4 MMOL/L (ref 3.5–5.3)
RBC # BLD AUTO: 2.73 X10*6/UL (ref 4.5–5.9)
RH FACTOR (ANTIGEN D): NORMAL
SODIUM SERPL-SCNC: 141 MMOL/L (ref 136–145)
SODIUM SERPL-SCNC: 141 MMOL/L (ref 136–145)
WBC # BLD AUTO: 5.7 X10*3/UL (ref 4.4–11.3)

## 2024-12-29 PROCEDURE — 99232 SBSQ HOSP IP/OBS MODERATE 35: CPT | Performed by: INTERNAL MEDICINE

## 2024-12-29 PROCEDURE — 86901 BLOOD TYPING SEROLOGIC RH(D): CPT | Performed by: NURSE PRACTITIONER

## 2024-12-29 PROCEDURE — 80048 BASIC METABOLIC PNL TOTAL CA: CPT | Performed by: NURSE PRACTITIONER

## 2024-12-29 PROCEDURE — 2500000004 HC RX 250 GENERAL PHARMACY W/ HCPCS (ALT 636 FOR OP/ED): Performed by: NURSE PRACTITIONER

## 2024-12-29 PROCEDURE — 2500000001 HC RX 250 WO HCPCS SELF ADMINISTERED DRUGS (ALT 637 FOR MEDICARE OP): Performed by: NURSE PRACTITIONER

## 2024-12-29 PROCEDURE — 83735 ASSAY OF MAGNESIUM: CPT | Performed by: NURSE PRACTITIONER

## 2024-12-29 PROCEDURE — 85027 COMPLETE CBC AUTOMATED: CPT | Performed by: NURSE PRACTITIONER

## 2024-12-29 PROCEDURE — 80048 BASIC METABOLIC PNL TOTAL CA: CPT | Mod: CCI | Performed by: NURSE PRACTITIONER

## 2024-12-29 PROCEDURE — 36415 COLL VENOUS BLD VENIPUNCTURE: CPT | Performed by: NURSE PRACTITIONER

## 2024-12-29 PROCEDURE — 82947 ASSAY GLUCOSE BLOOD QUANT: CPT

## 2024-12-29 PROCEDURE — 1100000001 HC PRIVATE ROOM DAILY

## 2024-12-29 RX ADMIN — POLYETHYLENE GLYCOL 3350 17 G: 17 POWDER, FOR SOLUTION ORAL at 08:26

## 2024-12-29 RX ADMIN — CAPTOPRIL 12.5 MG: 12.5 TABLET ORAL at 15:42

## 2024-12-29 RX ADMIN — CAPTOPRIL 12.5 MG: 12.5 TABLET ORAL at 08:26

## 2024-12-29 RX ADMIN — CAPTOPRIL 12.5 MG: 12.5 TABLET ORAL at 21:00

## 2024-12-29 RX ADMIN — ENOXAPARIN SODIUM 40 MG: 100 INJECTION SUBCUTANEOUS at 18:28

## 2024-12-29 ASSESSMENT — COGNITIVE AND FUNCTIONAL STATUS - GENERAL
MOVING TO AND FROM BED TO CHAIR: A LOT
MOBILITY SCORE: 11
TURNING FROM BACK TO SIDE WHILE IN FLAT BAD: A LOT
HELP NEEDED FOR BATHING: A LOT
PERSONAL GROOMING: A LOT
DAILY ACTIVITIY SCORE: 14
DRESSING REGULAR UPPER BODY CLOTHING: A LITTLE
DRESSING REGULAR LOWER BODY CLOTHING: A LITTLE
MOVING FROM LYING ON BACK TO SITTING ON SIDE OF FLAT BED WITH BEDRAILS: A LOT
TOILETING: A LOT
WALKING IN HOSPITAL ROOM: A LOT
EATING MEALS: A LOT
STANDING UP FROM CHAIR USING ARMS: A LOT
CLIMB 3 TO 5 STEPS WITH RAILING: TOTAL

## 2024-12-29 ASSESSMENT — PAIN - FUNCTIONAL ASSESSMENT
PAIN_FUNCTIONAL_ASSESSMENT: 0-10
PAIN_FUNCTIONAL_ASSESSMENT: 0-10

## 2024-12-29 ASSESSMENT — PAIN SCALES - GENERAL
PAINLEVEL_OUTOF10: 0 - NO PAIN
PAINLEVEL_OUTOF10: 0 - NO PAIN

## 2024-12-29 NOTE — ASSESSMENT & PLAN NOTE
#Type B Dissection  #HTN  ::CT Chest 12/26- Age-indeterminate type B aortic dissection extending from the mid aortic arch into the visualized portion of the abdominal aorta. There is associated mild dilatation of theproximal descending aorta measuring 3.9 cm. The celiac, superior mesenteric and bilateral renal arteries arise from the true lumen. The true lumen is mildly compressed by the false lumen.   ::CTA CAP 12/26- type B dissection, bilateral renal arteries, celiac trunk, superior mesenteric artery   and inferior mesenteric artery arise from the true lumen.   :: s/p nicardipine drip  :: TTE 12/28: EF 63%, probable moderate aortic regurg  -Vascular surgery consult  -Cardiac surgery consult (given dissection starts at aortic arch)  -Impulse control (HR<60, SBP<120)  - 12/29 No previous home meds   12/29 heart rate 40 - 50's hold beta blocker   -12/29 c/w  captopril and determine long acting HTN   -Serial abdominal exams and NV checks

## 2024-12-29 NOTE — ASSESSMENT & PLAN NOTE
Neuro:  #Dementia   #Recurrent falls  ::CTH/C-spine- no evidence for an acute fracture or subluxation of the cervical spine. No acute intracranial pathology.   -Continue to monitor  -Delirium precautions  12/29 Family at bedside full care at home     Anemia  ::Hgb ~13  ::Iron, ferritin WNL  -Hgb on admission 11.5-->10 -->8  -Maintain active T&S  12/29 Add Iron studies

## 2024-12-29 NOTE — ASSESSMENT & PLAN NOTE
Wound Team Summary Assessment: The wound care team came to bedside to assess the patient's BLE wounds related to a fall.  The patient has dry wounds his bilateral second toes. The patient feet were cleansed with vashe wound cleanser and gently pat dry. Since the wounds are dry, barrier film was applied to the wounds and left open to air.  The patient has a LLE laceration that currently has steri strips over the laceration. The steri strips are dry and intact with dried blood strikethrough. The patient also has 2 dry wounds on the LLE that were cleansed with vashe, barrier film applied and left open to air.    Wound Team Plan: Recommendation: Daily    Bilateral feet wounds: Cleanse the feet with vashe wound cleanser and gently pat dry   Apply barrier film to the wounds and leave open to air    Left lower extremities: Cleanse the wounds with vashe wound cleanser and gently pat dry   Apply barrier film on the dry wounds and leave open to air or apply mepilex lite silicone dressing   LLE laceration: Patient has steri strips in place. The steri strips will fall off in time. A mepilex lite was applied over top.      Disposition   Daughter at bedside and patient is full care at home with mom.  Daughter is a nurse and there is a son.  Children are involved.  Will discuss with TCC on Monday will need home care

## 2024-12-29 NOTE — PROGRESS NOTES
Elian Betancourt is a 79 y.o. male on day 3 of admission presenting with Dissection of descending aorta (Multi).    Subjective   Transfer from CICU  Patient with finding of aortic B dissection after fall  HX of advanced neuro decline full care at home by wife   Cooperates- full feed   Family daughter at bedside, no prior meds  Currently sinu bradycardia - 40's hold low dose beta blocker  HTN control for impulse control now with captiril every 8 hours will need daily long acting change   Daughter at bedside discussed plan     Objective     Physical Exam  HENT:      Mouth/Throat:      Mouth: Mucous membranes are moist.   Cardiovascular:      Rate and Rhythm: Bradycardia present.   Pulmonary:      Comments: Poor effort   Abdominal:      Palpations: Abdomen is soft.   Musculoskeletal:      Comments: Rigid    Skin:     General: Skin is warm and dry.      Findings: Bruising present.      Comments: Laceration to left leg   Neurological:      Mental Status: He is alert. Mental status is at baseline.      Coordination: Coordination abnormal.       Last Recorded Vitals  Blood pressure 149/72, pulse (!) 48, temperature 36.7 °C (98.1 °F), temperature source Temporal, resp. rate 18, weight 72.5 kg (159 lb 13.3 oz), SpO2 96%.  Intake/Output last 3 Shifts:  I/O last 3 completed shifts:  In: 480 (6.6 mL/kg) [P.O.:480]  Out: 975 (13.4 mL/kg) [Urine:975 (0.4 mL/kg/hr)]  Weight: 72.5 kg     Relevant Results  Scheduled medications  captopril, 12.5 mg, oral, TID  docusate sodium, 100 mg, oral, Daily  enoxaparin, 40 mg, subcutaneous, q24h  [Held by provider] metoprolol succinate XL, 12.5 mg, oral, Daily  OLANZapine zydis, 5 mg, oral, Once  perflutren protein A microsphere, 0.5 mL, intravenous, Once in imaging  polyethylene glycol, 17 g, oral, Daily  sulfur hexafluoride microsphr, 2 mL, intravenous, Once in imaging      Continuous medications     PRN medications     Results for orders placed or performed during the hospital encounter of  12/26/24 (from the past 24 hours)   CBC   Result Value Ref Range    WBC 4.7 4.4 - 11.3 x10*3/uL    nRBC 0.0 0.0 - 0.0 /100 WBCs    RBC 2.81 (L) 4.50 - 5.90 x10*6/uL    Hemoglobin 8.7 (L) 13.5 - 17.5 g/dL    Hematocrit 26.7 (L) 41.0 - 52.0 %    MCV 95 80 - 100 fL    MCH 31.0 26.0 - 34.0 pg    MCHC 32.6 32.0 - 36.0 g/dL    RDW 13.7 11.5 - 14.5 %    Platelets 139 (L) 150 - 450 x10*3/uL   Renal function panel   Result Value Ref Range    Glucose 140 (H) 74 - 99 mg/dL    Sodium 138 136 - 145 mmol/L    Potassium 4.0 3.5 - 5.3 mmol/L    Chloride 106 98 - 107 mmol/L    Bicarbonate 27 21 - 32 mmol/L    Anion Gap 9 (L) 10 - 20 mmol/L    Urea Nitrogen 22 6 - 23 mg/dL    Creatinine 0.76 0.50 - 1.30 mg/dL    eGFR >90 >60 mL/min/1.73m*2    Calcium 8.3 (L) 8.6 - 10.6 mg/dL    Phosphorus 3.3 2.5 - 4.9 mg/dL    Albumin 2.6 (L) 3.4 - 5.0 g/dL   Magnesium   Result Value Ref Range    Magnesium 1.94 1.60 - 2.40 mg/dL   Type and screen   Result Value Ref Range    ABO TYPE B     Rh TYPE POS     ANTIBODY SCREEN NEG    Basic Metabolic Panel   Result Value Ref Range    Glucose 103 (H) 74 - 99 mg/dL    Sodium 141 136 - 145 mmol/L    Potassium 4.4 3.5 - 5.3 mmol/L    Chloride 108 (H) 98 - 107 mmol/L    Bicarbonate 26 21 - 32 mmol/L    Anion Gap 11 10 - 20 mmol/L    Urea Nitrogen 19 6 - 23 mg/dL    Creatinine 0.67 0.50 - 1.30 mg/dL    eGFR >90 >60 mL/min/1.73m*2    Calcium 8.4 (L) 8.6 - 10.6 mg/dL   POCT GLUCOSE   Result Value Ref Range    POCT Glucose 122 (H) 74 - 99 mg/dL      Transthoracic Echo (TTE) Complete   Final Result      CT angio chest abdomen pelvis   Final Result   Type B aortic dissection involving zones 3 through 9 with all of the   primary arteries of the abdomen originating from the true lumen.   Severely displaced, subacute appearing left mid clavicular fracture.   Multiple left-sided nondisplaced subacute rib fractures. Pancreatic   tail multiloculated, macrocystic lesion which could represent   mucinous cystic neoplasm versus  serous cystic neoplasm. Nonemergent,   outpatient MRCP could better evaluate if clinically indicated. Other   minor findings as above.        I personally reviewed the images/study and I agree with the findings   as stated by Dr. Michele Martínez. This study was interpreted at   [facility], [place], [place].        Signed by: Aubrey Hoyt 12/26/2024 10:38 PM   Dictation workstation:   [id number]             Assessment/Plan   Mr. Betancourt is a 79M with PMHx of dementia, DDD, HTN, DLD, recurrent falls who presents to [facility] ED for evaluation of LLE laceration after fall incidentally found to have type B aortic dissection. Transferred to  CICU for further evaluation of dissection. CTA CAP showing type B dissection, bilateral renal arteries, celiac trunk, superior mesenteric artery and inferior mesenteric artery arise from the true lumen.   Assessment & Plan  Dissection of descending aorta (Multi)  #Type B Dissection  #HTN  ::CT Chest 12/26- Age-indeterminate type B aortic dissection extending from the mid aortic arch into the visualized portion of the abdominal aorta. There is associated mild dilatation of theproximal descending aorta measuring 3.9 cm. The celiac, superior mesenteric and bilateral renal arteries arise from the true lumen. The true lumen is mildly compressed by the false lumen.   ::CTA CAP 12/26- type B dissection, bilateral renal arteries, celiac trunk, superior mesenteric artery   and inferior mesenteric artery arise from the true lumen.   :: s/p nicardipine drip  :: TTE 12/28: EF 63%, probable moderate aortic regurg  -Vascular surgery consult  -Cardiac surgery consult (given dissection starts at aortic arch)  -Impulse control (HR<60, SBP<120)  - 12/29 No previous home meds   12/29 heart rate 40 - 50's hold beta blocker   -12/29 c/w  captopril and determine long acting HTN   -Serial abdominal exams and NV checks   Frailty  Neuro:  #Dementia   #Recurrent  falls  ::CTH/C-spine- no evidence for an acute fracture or subluxation of the cervical spine. No acute intracranial pathology.   -Continue to monitor  -Delirium precautions  12/29 Family at bedside full care at home     Anemia  ::Hgb ~13  ::Iron, ferritin WNL  -Hgb on admission 11.5-->10 -->8  -Maintain active T&S  12/29 Add Iron studies      Injury of left lower extremity  Wound Team Summary Assessment: The wound care team came to bedside to assess the patient's BLE wounds related to a fall.  The patient has dry wounds his bilateral second toes. The patient feet were cleansed with vashe wound cleanser and gently pat dry. Since the wounds are dry, barrier film was applied to the wounds and left open to air.  The patient has a LLE laceration that currently has steri strips over the laceration. The steri strips are dry and intact with dried blood strikethrough. The patient also has 2 dry wounds on the LLE that were cleansed with vashe, barrier film applied and left open to air.    Wound Team Plan: Recommendation: Daily    Bilateral feet wounds: Cleanse the feet with vashe wound cleanser and gently pat dry   Apply barrier film to the wounds and leave open to air    Left lower extremities: Cleanse the wounds with vashe wound cleanser and gently pat dry   Apply barrier film on the dry wounds and leave open to air or apply mepilex lite silicone dressing   LLE laceration: Patient has steri strips in place. The steri strips will fall off in time. A mepilex lite was applied over top.      Disposition   Daughter at bedside and patient is full care at home with mom.  Daughter is a nurse and there is a son.  Children are involved.  Will discuss with TCC on Monday will need home care     DVT prophlaysis     SANCHO Rios-CNP  Seen and discussed with Dr Gray

## 2024-12-30 DIAGNOSIS — I71.00 DISSECTION OF AORTA, UNSPECIFIED PORTION OF AORTA (MULTI): Primary | ICD-10-CM

## 2024-12-30 LAB
ALBUMIN SERPL BCP-MCNC: 2.5 G/DL (ref 3.4–5)
ANION GAP SERPL CALC-SCNC: 9 MMOL/L (ref 10–20)
BACTERIA BLD CULT: NORMAL
BACTERIA BLD CULT: NORMAL
BUN SERPL-MCNC: 24 MG/DL (ref 6–23)
CALCIUM SERPL-MCNC: 8.6 MG/DL (ref 8.6–10.6)
CHLORIDE SERPL-SCNC: 108 MMOL/L (ref 98–107)
CO2 SERPL-SCNC: 27 MMOL/L (ref 21–32)
CREAT SERPL-MCNC: 0.6 MG/DL (ref 0.5–1.3)
EGFRCR SERPLBLD CKD-EPI 2021: >90 ML/MIN/1.73M*2
ERYTHROCYTE [DISTWIDTH] IN BLOOD BY AUTOMATED COUNT: 13.5 % (ref 11.5–14.5)
GLUCOSE SERPL-MCNC: 131 MG/DL (ref 74–99)
HCT VFR BLD AUTO: 23.9 % (ref 41–52)
HGB BLD-MCNC: 8.3 G/DL (ref 13.5–17.5)
MAGNESIUM SERPL-MCNC: 1.83 MG/DL (ref 1.6–2.4)
MCH RBC QN AUTO: 31.2 PG (ref 26–34)
MCHC RBC AUTO-ENTMCNC: 34.7 G/DL (ref 32–36)
MCV RBC AUTO: 90 FL (ref 80–100)
NRBC BLD-RTO: 0 /100 WBCS (ref 0–0)
PHOSPHATE SERPL-MCNC: 3.3 MG/DL (ref 2.5–4.9)
PLATELET # BLD AUTO: 148 X10*3/UL (ref 150–450)
POTASSIUM SERPL-SCNC: 4.2 MMOL/L (ref 3.5–5.3)
RBC # BLD AUTO: 2.66 X10*6/UL (ref 4.5–5.9)
SODIUM SERPL-SCNC: 140 MMOL/L (ref 136–145)
WBC # BLD AUTO: 5.8 X10*3/UL (ref 4.4–11.3)

## 2024-12-30 PROCEDURE — 2500000004 HC RX 250 GENERAL PHARMACY W/ HCPCS (ALT 636 FOR OP/ED): Performed by: NURSE PRACTITIONER

## 2024-12-30 PROCEDURE — 1100000001 HC PRIVATE ROOM DAILY

## 2024-12-30 PROCEDURE — 85027 COMPLETE CBC AUTOMATED: CPT | Performed by: NURSE PRACTITIONER

## 2024-12-30 PROCEDURE — 2500000001 HC RX 250 WO HCPCS SELF ADMINISTERED DRUGS (ALT 637 FOR MEDICARE OP): Performed by: NURSE PRACTITIONER

## 2024-12-30 PROCEDURE — 36415 COLL VENOUS BLD VENIPUNCTURE: CPT | Performed by: NURSE PRACTITIONER

## 2024-12-30 PROCEDURE — 99221 1ST HOSP IP/OBS SF/LOW 40: CPT | Performed by: STUDENT IN AN ORGANIZED HEALTH CARE EDUCATION/TRAINING PROGRAM

## 2024-12-30 PROCEDURE — 84100 ASSAY OF PHOSPHORUS: CPT | Performed by: NURSE PRACTITIONER

## 2024-12-30 PROCEDURE — 83735 ASSAY OF MAGNESIUM: CPT | Performed by: NURSE PRACTITIONER

## 2024-12-30 PROCEDURE — 99232 SBSQ HOSP IP/OBS MODERATE 35: CPT | Performed by: STUDENT IN AN ORGANIZED HEALTH CARE EDUCATION/TRAINING PROGRAM

## 2024-12-30 RX ORDER — LISINOPRIL 5 MG/1
5 TABLET ORAL DAILY
Status: DISCONTINUED | OUTPATIENT
Start: 2024-12-30 | End: 2025-01-01

## 2024-12-30 RX ADMIN — POLYETHYLENE GLYCOL 3350 17 G: 17 POWDER, FOR SOLUTION ORAL at 09:20

## 2024-12-30 RX ADMIN — ENOXAPARIN SODIUM 40 MG: 100 INJECTION SUBCUTANEOUS at 17:51

## 2024-12-30 RX ADMIN — CAPTOPRIL 12.5 MG: 12.5 TABLET ORAL at 09:20

## 2024-12-30 RX ADMIN — DOCUSATE SODIUM 100 MG: 100 CAPSULE, LIQUID FILLED ORAL at 06:04

## 2024-12-30 ASSESSMENT — COGNITIVE AND FUNCTIONAL STATUS - GENERAL
MOVING TO AND FROM BED TO CHAIR: A LOT
MOVING TO AND FROM BED TO CHAIR: TOTAL
MOVING FROM LYING ON BACK TO SITTING ON SIDE OF FLAT BED WITH BEDRAILS: A LOT
TURNING FROM BACK TO SIDE WHILE IN FLAT BAD: A LOT
TOILETING: A LOT
HELP NEEDED FOR BATHING: TOTAL
HELP NEEDED FOR BATHING: A LOT
DAILY ACTIVITIY SCORE: 7
DAILY ACTIVITIY SCORE: 13
WALKING IN HOSPITAL ROOM: TOTAL
CLIMB 3 TO 5 STEPS WITH RAILING: TOTAL
MOVING FROM LYING ON BACK TO SITTING ON SIDE OF FLAT BED WITH BEDRAILS: A LOT
TURNING FROM BACK TO SIDE WHILE IN FLAT BAD: A LOT
DRESSING REGULAR LOWER BODY CLOTHING: A LOT
TOILETING: TOTAL
DRESSING REGULAR UPPER BODY CLOTHING: TOTAL
MOBILITY SCORE: 8
STANDING UP FROM CHAIR USING ARMS: TOTAL
MOBILITY SCORE: 11
DRESSING REGULAR UPPER BODY CLOTHING: A LITTLE
DRESSING REGULAR LOWER BODY CLOTHING: TOTAL
WALKING IN HOSPITAL ROOM: A LOT
EATING MEALS: A LOT
EATING MEALS: A LOT
PERSONAL GROOMING: A LOT
PERSONAL GROOMING: TOTAL
STANDING UP FROM CHAIR USING ARMS: A LOT
CLIMB 3 TO 5 STEPS WITH RAILING: TOTAL

## 2024-12-30 ASSESSMENT — PAIN SCALES - GENERAL: PAINLEVEL_OUTOF10: 0 - NO PAIN

## 2024-12-30 NOTE — ASSESSMENT & PLAN NOTE
Dementia   Recurrent falls  - CTH/C-spine- no evidence for an acute fracture or subluxation of the cervical spine. No acute intracranial pathology.   - Delirium precautions  - 12/29 Family at bedside full care at home   - 12/30 sitter at bedside, mentation wax and wanes. PT rec high intensity therapy. TCC to discuss with family who originally wanted home with home care.   - ? Falls due to neurologic issue such as parkinsons? Patient to follow up with PCP.     Anemia  - Hgb ~13  - 12/26 Iron 58, UIBC 208, TIBC 266, %Sat 22, ferritin 49  - Hgb on admission 11.5-->10 -->8.2-->7.6-->8.-->8.7-->8.6  - Maintain active T&S (12/29)  - 12/30 no active bleed identified  - monitor Hgb daily and follow up with PCP outpatient

## 2024-12-30 NOTE — CARE PLAN
Problem: Safety - Medical Restraint  Goal: Remains free of injury from restraints (Restraint for Interference with Medical Device)  Outcome: Progressing  Goal: Free from restraint(s) (Restraint for Interference with Medical Device)  Outcome: Progressing     Problem: Skin  Goal: Decreased wound size/increased tissue granulation at next dressing change  Outcome: Progressing  Goal: Participates in plan/prevention/treatment measures  Outcome: Progressing  Goal: Prevent/manage excess moisture  Outcome: Progressing  Goal: Prevent/minimize sheer/friction injuries  Outcome: Progressing  Goal: Promote/optimize nutrition  Outcome: Progressing  Goal: Promote skin healing  Outcome: Progressing     Problem: Pain - Adult  Goal: Verbalizes/displays adequate comfort level or baseline comfort level  Outcome: Progressing     Problem: Safety - Adult  Goal: Free from fall injury  Outcome: Progressing     Problem: Discharge Planning  Goal: Discharge to home or other facility with appropriate resources  Outcome: Progressing     Problem: Chronic Conditions and Co-morbidities  Goal: Patient's chronic conditions and co-morbidity symptoms are monitored and maintained or improved  Outcome: Progressing       The clinical goals for the shift include pt will remain safe and free of falls throughout shift.      12/29/24 at 10:32 PM - KT ZURITA RN

## 2024-12-30 NOTE — HOSPITAL COURSE
Mr. Betancourt is a 79M with PMHx of dementia, DDD, HTN, DLD, recurrent falls who presents to [facility] ED for evaluation of LLE laceration after fall incidentally found to have type B aortic dissection. Transferred to  CICU for further evaluation of dissection. CTA CAP showing type B dissection, bilateral renal arteries, celiac trunk, superior mesenteric artery and inferior mesenteric artery arise from the true lumen.     CICU Course:   Patient started on Nicardipine drip, transitioned to captopril and Metoprolol.     Cardiac Surgery (given dissection starts at aortic arch) and Vascular Surgery both consulted. Given patient asymptomatic, chronic appearing on imaging, and high surgical risk, decision made to continue with impulse control and follow up outpatient with Vascular Surgery in 1 month with repeat CT angio chest/abdomen/pelvis.     Patient transferred to [facility] for further management.    HVI Course:  Metoprolol discontinued given bradycardia. Patient remained SB with HR 50's. Captopril transitioned to Lisinopril, dose optimized as indicated.     Patient with acute anemia of unclear etiology, no s/s of active bleeding, iron studies wnl. Hgb stable at 8.9 at discharge.    CT C/A/P 12/26/24 showed a displaced, subacute left mid clavicle fracture, multiple left-sided nondisplaced subacute rib fractures, pancreatic lesion (which could represent mucinous cystic neoplasm versus serous cystic neoplasm), outpatient MRCP recommended if clinically indicated.     CT Chest 12/26/24 showed few scattered pulmonary nodules measuring up to 7mm, non-contrast chest CT in  3-6 months is recommended.     PT/OT recommended Moderate intensity, patient's family agreeable.    Patient denied having any other home going needs. Follow up appointments scheduled.     Upon review of medications, lab values, and vital signs. Pt was deemed stable for discharge in satisfactory condition.     Discharge weight: 74.8 kg (Bed weight)     More than 60  minutes were spent in coordinating patient discharge.

## 2024-12-30 NOTE — ASSESSMENT & PLAN NOTE
Type B Dissection  HTN  - CT Chest 12/26- Age-indeterminate type B aortic dissection extending from the mid aortic arch into the visualized portion of the abdominal aorta. There is associated mild dilatation of theproximal descending aorta measuring 3.9 cm. The celiac, superior mesenteric and bilateral renal arteries arise from the true lumen. The true lumen is mildly compressed by the false lumen.   - CTA CAP 12/26- type B dissection, bilateral renal arteries, celiac trunk, superior mesenteric artery   and inferior mesenteric artery arise from the true lumen.   - s/p nicardipine drip  - TTE 12/28: EF 63%, probable moderate aortic regurg  - Vascular surgery consult rec conservative therapy with impulse control   - Cardiac surgery consult (given dissection starts at aortic arch) recommends conservative therapy with impulse control   - Impulse control (HR<60, SBP<120)  - 12/29 No previous home meds   - 12/29 heart rate 40 - 50's hold beta blocker   - 12/29 c/w  captopril and determine long acting HTN   - 12/30 Transition captopril to lisinopril 5mg daily. If BP not to goal, will increase to 10mg daily. Would not restart BB given continued BB with HR in the 50's off of BB.   - Serial abdominal exams and NV checks

## 2024-12-30 NOTE — ASSESSMENT & PLAN NOTE
Wound Team Summary Assessment: The wound care team came to bedside to assess the patient's BLE wounds related to a fall.  The patient has dry wounds his bilateral second toes. The patient feet were cleansed with vashe wound cleanser and gently pat dry. Since the wounds are dry, barrier film was applied to the wounds and left open to air.  The patient has a LLE laceration that currently has steri strips over the laceration. The steri strips are dry and intact with dried blood strikethrough. The patient also has 2 dry wounds on the LLE that were cleansed with vashe, barrier film applied and left open to air.    Wound Team Plan: Recommendation: Daily    Bilateral feet wounds: Cleanse the feet with vashe wound cleanser and gently pat dry   Apply barrier film to the wounds and leave open to air    Left lower extremities: Cleanse the wounds with vashe wound cleanser and gently pat dry   Apply barrier film on the dry wounds and leave open to air or apply mepilex lite silicone dressing   LLE laceration: Patient has steri strips in place. The steri strips will fall off in time. A mepilex lite was applied over top.      Pancreatic Lesion  - CT C/A/P 12/26/24 showed left mid clavicle fx, rib fx, pancreatic lesion mucinosis cystic neoplasm vs serous cystic lesion. Outpatient MRCP recommended.     Pulmonary Nodules  - CT Chest 12/26/24 showed few scattered pulmonary nodules measuring up to 7mm   - Noncontrast CT in 3-6 months recommended

## 2024-12-30 NOTE — PROGRESS NOTES
VASCULAR SURGERY PROGRESS NOTE  Assessment/Plan   Elian Betancourt is 79 y.o. 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     Remains asymptomatic.    Plan:  Continue impulse control  Monitor for signs of malperfusion  Will arrange for 1 month follow-up with CT angio chest/abdomen/pelvis  Please contact vascular surgery with any questions or concerns    D/w attending, Dr. Nando Davis MD  Vascular Surgery [id number] or Epic Chat      Subjective   No adverse events overnight. No chest or back pain. Denies abdominal pain, abdomen is soft and nontender.     Objective   Vitals:  Heart Rate:  [48-69]   Temp:  [36.5 °C (97.7 °F)-37.2 °C (99 °F)]   Resp:  [17-19]   BP: ()/(55-72)   Weight:  [74.5 kg (164 lb 3.9 oz)]   SpO2:  [93 %-96 %]     Exam:  Constitutional: No acute distress, resting comfortably  Neuro:  AOx0, following commands  CV: no tachycardia  Pulm: non-labored on nasal canula  GI: soft, non-tender, non-distended  Musculoskeletal: moving all extremities  Extremities: no edema  Pulse Exam: palpable bilateral DP pulses    Labs:  Results from last 7 days   Lab Units 12/29/24 2009 12/28/24 1915 12/28/24  0526   WBC AUTO x10*3/uL 5.7 4.7 4.8   HEMOGLOBIN g/dL 8.6* 8.7* 8.0*   PLATELETS AUTO x10*3/uL 145* 139* 121*      Results from last 7 days   Lab Units 12/29/24 2009 12/29/24  0801 12/28/24 1915   SODIUM mmol/L 141 141 138   POTASSIUM mmol/L 4.0 4.4 4.0   CHLORIDE mmol/L 110* 108* 106   CO2 mmol/L 27 26 27   BUN mg/dL 20 19 22   CREATININE mg/dL 0.79 0.67 0.76   GLUCOSE mg/dL 127* 103* 140*   MAGNESIUM mg/dL 1.78  --  1.94   PHOSPHORUS mg/dL 3.1  --  3.3      Results from last 7 days   Lab Units 12/26/24  0720   INR  1.2   PROTIME seconds 11.9

## 2024-12-30 NOTE — PROGRESS NOTES
"Elian Betancourt is a 79 y.o. male on day 4 of admission presenting with Dissection of descending aorta (Multi).    Subjective   Patient follows commands. Answers questions appropriately at times. \"I thought there was another Nurse Practitioner\". Lifts legs, shook my hand. BP labile. After holding BB, SR HR in the 55-69. Sitter at bedside.     Objective     Physical Exam  HENT:      Mouth/Throat:      Mouth: Mucous membranes are moist.   Cardiovascular:      Rate and Rhythm: Bradycardia present.   Pulmonary:      Comments: Poor effort   Abdominal:      General: Bowel sounds are normal.      Palpations: Abdomen is soft.      Tenderness: There is no abdominal tenderness. There is no guarding.   Musculoskeletal:      Comments: Rigid, weakness   Skin:     General: Skin is warm and dry.      Findings: Bruising present.      Comments: Laceration to left leg   Neurological:      Mental Status: He is alert. Mental status is at baseline.      Coordination: Coordination abnormal.         Last Recorded Vitals  Blood pressure 143/71, pulse 53, temperature 36.7 °C (98.1 °F), temperature source Temporal, resp. rate 18, weight 74.5 kg (164 lb 3.9 oz), SpO2 93%.  Intake/Output last 3 Shifts:  I/O last 3 completed shifts:  In: 960 (12.9 mL/kg) [P.O.:960]  Out: 1300 (17.4 mL/kg) [Urine:1300 (0.5 mL/kg/hr)]  Weight: 74.5 kg     Relevant Results  Scheduled medications  captopril, 12.5 mg, oral, TID  docusate sodium, 100 mg, oral, Daily  enoxaparin, 40 mg, subcutaneous, q24h  [Held by provider] metoprolol succinate XL, 12.5 mg, oral, Daily  OLANZapine zydis, 5 mg, oral, Once  perflutren protein A microsphere, 0.5 mL, intravenous, Once in imaging  polyethylene glycol, 17 g, oral, Daily  sulfur hexafluoride microsphr, 2 mL, intravenous, Once in imaging      Continuous medications     PRN medications     Results for orders placed or performed during the hospital encounter of 12/26/24 (from the past 24 hours)   POCT GLUCOSE   Result Value Ref " Range    POCT Glucose 122 (H) 74 - 99 mg/dL   CBC   Result Value Ref Range    WBC 5.7 4.4 - 11.3 x10*3/uL    nRBC 0.0 0.0 - 0.0 /100 WBCs    RBC 2.73 (L) 4.50 - 5.90 x10*6/uL    Hemoglobin 8.6 (L) 13.5 - 17.5 g/dL    Hematocrit 26.3 (L) 41.0 - 52.0 %    MCV 96 80 - 100 fL    MCH 31.5 26.0 - 34.0 pg    MCHC 32.7 32.0 - 36.0 g/dL    RDW 13.3 11.5 - 14.5 %    Platelets 145 (L) 150 - 450 x10*3/uL   Renal function panel   Result Value Ref Range    Glucose 127 (H) 74 - 99 mg/dL    Sodium 141 136 - 145 mmol/L    Potassium 4.0 3.5 - 5.3 mmol/L    Chloride 110 (H) 98 - 107 mmol/L    Bicarbonate 27 21 - 32 mmol/L    Anion Gap 8 (L) 10 - 20 mmol/L    Urea Nitrogen 20 6 - 23 mg/dL    Creatinine 0.79 0.50 - 1.30 mg/dL    eGFR 90 >60 mL/min/1.73m*2    Calcium 8.1 (L) 8.6 - 10.6 mg/dL    Phosphorus 3.1 2.5 - 4.9 mg/dL    Albumin 2.4 (L) 3.4 - 5.0 g/dL   Magnesium   Result Value Ref Range    Magnesium 1.78 1.60 - 2.40 mg/dL      Transthoracic Echo (TTE) Complete   Final Result      CT angio chest abdomen pelvis   Final Result   Type B aortic dissection involving zones 3 through 9 with all of the   primary arteries of the abdomen originating from the true lumen.   Severely displaced, subacute appearing left mid clavicular fracture.   Multiple left-sided nondisplaced subacute rib fractures. Pancreatic   tail multiloculated, macrocystic lesion which could represent   mucinous cystic neoplasm versus serous cystic neoplasm. Nonemergent,   outpatient MRCP could better evaluate if clinically indicated. Other   minor findings as above.        I personally reviewed the images/study and I agree with the findings   as stated by Dr. Michele Martínez. This study was interpreted at   [facility], [place], [place].        Signed by: Aubrey Hoyt 12/26/2024 10:38 PM   Dictation workstation:   [id number]           CT Chest 12/26/24  1. Age-indeterminate type B aortic dissection extending from the mid  aortic arch into  the visualized portion of the abdominal aorta. There  is associated mild dilatation of the proximal descending aorta  measuring 3.9 cm. The celiac, superior mesenteric and bilateral renal  arteries arise from the true lumen. The true lumen is mildly  compressed by the false lumen.  2. Mild dilatation of the ascending aorta measuring 4.1 cm.  3. Cardiomegaly with severe coronary artery calcifications.  4. Small bilateral pleural effusions with bibasilar areas of  atelectasis.  5. Few scattered small pulmonary nodules measuring up to 7 mm along  the minor fissure. Non-contrast chest CT at 3-6 months is recommended.  If the nodules are stable at time of repeat CT, then future CT at  18-24 months (from today's scan) is considered optional for low-risk  patients, but is recommended for high-risk patients (Per Fleischner  Society guidelines).  6. Large hiatal hernia with intrathoracic stomach.  7. Hepatic steatosis.  8. Multiple loculated cystic structure in the gastrosplenic ligament  measuring 6.4 cm x 4.8 cm. Findings may be secondary to a pancreatic  pseudocyst, although technically indeterminate.  Recommend follow-up  multiphase contrast-enhanced CT of the abdomen  9. Findings of chronic pancreatitis.    CT Chest/Abdomen/Pelvis 12/26/24  Type B aortic dissection involving zones 3 through 9 with all of the  primary arteries of the abdomen originating from the true lumen.  Severely displaced, subacute appearing left mid clavicular fracture.  Multiple left-sided nondisplaced subacute rib fractures. Pancreatic  tail multiloculated, macrocystic lesion which could represent  mucinous cystic neoplasm versus serous cystic neoplasm. Nonemergent,  outpatient MRCP could better evaluate if clinically indicated. Other  minor findings as above.          Assessment/Plan   Mr. Betancourt is a 79M with PMHx of dementia, DDD, HTN, DLD, recurrent falls who presents to [facility] ED for evaluation of LLE laceration after fall incidentally  found to have type B aortic dissection. Transferred to  CICU for further evaluation of dissection. CTA CAP showing type B dissection, bilateral renal arteries, celiac trunk, superior mesenteric artery and inferior mesenteric artery arise from the true lumen.   Assessment & Plan  Dissection of descending aorta (Multi)  Type B Dissection  HTN  - CT Chest 12/26- Age-indeterminate type B aortic dissection extending from the mid aortic arch into the visualized portion of the abdominal aorta. There is associated mild dilatation of theproximal descending aorta measuring 3.9 cm. The celiac, superior mesenteric and bilateral renal arteries arise from the true lumen. The true lumen is mildly compressed by the false lumen.   - CTA CAP 12/26- type B dissection, bilateral renal arteries, celiac trunk, superior mesenteric artery   and inferior mesenteric artery arise from the true lumen.   - s/p nicardipine drip  - TTE 12/28: EF 63%, probable moderate aortic regurg  - Vascular surgery consult rec conservative therapy with impulse control   - Cardiac surgery consult (given dissection starts at aortic arch) recommends conservative therapy with impulse control   - Impulse control (HR<60, SBP<120)  - 12/29 No previous home meds   - 12/29 heart rate 40 - 50's hold beta blocker   - 12/29 c/w  captopril and determine long acting HTN   - 12/30 Transition captopril to lisinopril 5mg daily. If BP not to goal, will increase to 10mg daily. Would not restart BB given continued BB with HR in the 50's off of BB.   - Serial abdominal exams and NV checks   Frailty  Dementia   Recurrent falls  - CTH/C-spine- no evidence for an acute fracture or subluxation of the cervical spine. No acute intracranial pathology.   - Delirium precautions  - 12/29 Family at bedside full care at home   - 12/30 sitter at bedside, mentation wax and wanes. PT rec high intensity therapy. TCC to discuss with family who originally wanted home with home care.   - ? Falls  due to neurologic issue such as parkinsons? Patient to follow up with PCP.     Anemia  - Hgb ~13  - 12/26 Iron 58, UIBC 208, TIBC 266, %Sat 22, ferritin 49  - Hgb on admission 11.5-->10 -->8.2-->7.6-->8.-->8.7-->8.6  - Maintain active T&S (12/29)  - 12/30 no active bleed identified  - monitor Hgb daily and follow up with PCP outpatient        Injury of left lower extremity  Wound Team Summary Assessment: The wound care team came to bedside to assess the patient's BLE wounds related to a fall.  The patient has dry wounds his bilateral second toes. The patient feet were cleansed with vashe wound cleanser and gently pat dry. Since the wounds are dry, barrier film was applied to the wounds and left open to air.  The patient has a LLE laceration that currently has steri strips over the laceration. The steri strips are dry and intact with dried blood strikethrough. The patient also has 2 dry wounds on the LLE that were cleansed with vashe, barrier film applied and left open to air.    Wound Team Plan: Recommendation: Daily    Bilateral feet wounds: Cleanse the feet with vashe wound cleanser and gently pat dry   Apply barrier film to the wounds and leave open to air    Left lower extremities: Cleanse the wounds with vashe wound cleanser and gently pat dry   Apply barrier film on the dry wounds and leave open to air or apply mepilex lite silicone dressing   LLE laceration: Patient has steri strips in place. The steri strips will fall off in time. A mepilex lite was applied over top.      Pancreatic Lesion  - CT C/A/P 12/26/24 showed left mid clavicle fx, rib fx, pancreatic lesion mucinosis cystic neoplasm vs serous cystic lesion. Outpatient MRCP recommended.     Pulmonary Nodules  - CT Chest 12/26/24 showed few scattered pulmonary nodules measuring up to 7mm   - Noncontrast CT in 3-6 months recommended     DVT ppx:lovenox   DISPO: pending BP control, patient is full care at home with wife.  Daughter is a nurse and there is  a son.  Children are involved.  Home with homecare. PT mod intensity.  TCC to discuss PT recs with wife.     All labs, vital signs, tests & imaging results, and medications were reviewed.  Seen and discussed with Dr. Martina Anderson, SANCHO-CNP

## 2024-12-31 ENCOUNTER — APPOINTMENT (OUTPATIENT)
Dept: CARDIOLOGY | Facility: HOSPITAL | Age: 79
DRG: 300 | End: 2024-12-31
Payer: MEDICARE

## 2024-12-31 LAB
ALBUMIN SERPL BCP-MCNC: 2.5 G/DL (ref 3.4–5)
ANION GAP SERPL CALC-SCNC: 11 MMOL/L (ref 10–20)
ATRIAL RATE: 141 BPM
ATRIAL RATE: 43 BPM
BUN SERPL-MCNC: 22 MG/DL (ref 6–23)
CALCIUM SERPL-MCNC: 8.3 MG/DL (ref 8.6–10.6)
CHLORIDE SERPL-SCNC: 106 MMOL/L (ref 98–107)
CO2 SERPL-SCNC: 27 MMOL/L (ref 21–32)
CREAT SERPL-MCNC: 0.6 MG/DL (ref 0.5–1.3)
EGFRCR SERPLBLD CKD-EPI 2021: >90 ML/MIN/1.73M*2
ERYTHROCYTE [DISTWIDTH] IN BLOOD BY AUTOMATED COUNT: 13.5 % (ref 11.5–14.5)
GLUCOSE SERPL-MCNC: 129 MG/DL (ref 74–99)
HCT VFR BLD AUTO: 26.1 % (ref 41–52)
HGB BLD-MCNC: 8.9 G/DL (ref 13.5–17.5)
MAGNESIUM SERPL-MCNC: 1.9 MG/DL (ref 1.6–2.4)
MCH RBC QN AUTO: 31.6 PG (ref 26–34)
MCHC RBC AUTO-ENTMCNC: 34.1 G/DL (ref 32–36)
MCV RBC AUTO: 93 FL (ref 80–100)
NRBC BLD-RTO: 0 /100 WBCS (ref 0–0)
P AXIS: 62 DEGREES
P OFFSET: 190 MS
P ONSET: 129 MS
PHOSPHATE SERPL-MCNC: 3.4 MG/DL (ref 2.5–4.9)
PLATELET # BLD AUTO: 154 X10*3/UL (ref 150–450)
POTASSIUM SERPL-SCNC: 3.9 MMOL/L (ref 3.5–5.3)
PR INTERVAL: 152 MS
Q ONSET: 203 MS
Q ONSET: 205 MS
QRS COUNT: 14 BEATS
QRS COUNT: 7 BEATS
QRS DURATION: 144 MS
QRS DURATION: 146 MS
QT INTERVAL: 436 MS
QT INTERVAL: 524 MS
QTC CALCULATION(BAZETT): 442 MS
QTC CALCULATION(BAZETT): 524 MS
QTC FREDERICIA: 468 MS
QTC FREDERICIA: 493 MS
R AXIS: -36 DEGREES
R AXIS: -57 DEGREES
RBC # BLD AUTO: 2.82 X10*6/UL (ref 4.5–5.9)
SODIUM SERPL-SCNC: 140 MMOL/L (ref 136–145)
T AXIS: 108 DEGREES
T AXIS: 74 DEGREES
T OFFSET: 421 MS
T OFFSET: 467 MS
VENTRICULAR RATE: 43 BPM
VENTRICULAR RATE: 87 BPM
WBC # BLD AUTO: 5.2 X10*3/UL (ref 4.4–11.3)

## 2024-12-31 PROCEDURE — 2500000001 HC RX 250 WO HCPCS SELF ADMINISTERED DRUGS (ALT 637 FOR MEDICARE OP): Performed by: NURSE PRACTITIONER

## 2024-12-31 PROCEDURE — 85027 COMPLETE CBC AUTOMATED: CPT | Performed by: NURSE PRACTITIONER

## 2024-12-31 PROCEDURE — 93005 ELECTROCARDIOGRAM TRACING: CPT

## 2024-12-31 PROCEDURE — 80069 RENAL FUNCTION PANEL: CPT | Performed by: NURSE PRACTITIONER

## 2024-12-31 PROCEDURE — 99232 SBSQ HOSP IP/OBS MODERATE 35: CPT | Performed by: STUDENT IN AN ORGANIZED HEALTH CARE EDUCATION/TRAINING PROGRAM

## 2024-12-31 PROCEDURE — 36415 COLL VENOUS BLD VENIPUNCTURE: CPT | Performed by: NURSE PRACTITIONER

## 2024-12-31 PROCEDURE — 1100000001 HC PRIVATE ROOM DAILY

## 2024-12-31 PROCEDURE — 2500000004 HC RX 250 GENERAL PHARMACY W/ HCPCS (ALT 636 FOR OP/ED): Performed by: NURSE PRACTITIONER

## 2024-12-31 PROCEDURE — 83735 ASSAY OF MAGNESIUM: CPT | Performed by: NURSE PRACTITIONER

## 2024-12-31 RX ADMIN — ENOXAPARIN SODIUM 40 MG: 100 INJECTION SUBCUTANEOUS at 16:00

## 2024-12-31 RX ADMIN — LISINOPRIL 5 MG: 5 TABLET ORAL at 09:13

## 2024-12-31 RX ADMIN — DOCUSATE SODIUM 100 MG: 100 CAPSULE, LIQUID FILLED ORAL at 06:04

## 2024-12-31 ASSESSMENT — COGNITIVE AND FUNCTIONAL STATUS - GENERAL
EATING MEALS: A LITTLE
DAILY ACTIVITIY SCORE: 13
WALKING IN HOSPITAL ROOM: A LOT
DRESSING REGULAR LOWER BODY CLOTHING: A LOT
HELP NEEDED FOR BATHING: A LOT
MOVING FROM LYING ON BACK TO SITTING ON SIDE OF FLAT BED WITH BEDRAILS: A LOT
MOBILITY SCORE: 10
PERSONAL GROOMING: A LOT
DRESSING REGULAR LOWER BODY CLOTHING: A LOT
DRESSING REGULAR UPPER BODY CLOTHING: A LOT
PERSONAL GROOMING: A LOT
TURNING FROM BACK TO SIDE WHILE IN FLAT BAD: A LOT
WALKING IN HOSPITAL ROOM: TOTAL
MOVING TO AND FROM BED TO CHAIR: A LOT
TOILETING: A LOT
DAILY ACTIVITIY SCORE: 13
MOVING FROM LYING ON BACK TO SITTING ON SIDE OF FLAT BED WITH BEDRAILS: A LOT
CLIMB 3 TO 5 STEPS WITH RAILING: TOTAL
TURNING FROM BACK TO SIDE WHILE IN FLAT BAD: A LOT
DRESSING REGULAR UPPER BODY CLOTHING: A LOT
CLIMB 3 TO 5 STEPS WITH RAILING: A LOT
TOILETING: A LOT
MOVING TO AND FROM BED TO CHAIR: A LOT
HELP NEEDED FOR BATHING: A LOT
EATING MEALS: A LITTLE
STANDING UP FROM CHAIR USING ARMS: A LOT
STANDING UP FROM CHAIR USING ARMS: A LOT
MOBILITY SCORE: 12

## 2024-12-31 ASSESSMENT — PAIN - FUNCTIONAL ASSESSMENT
PAIN_FUNCTIONAL_ASSESSMENT: 0-10
PAIN_FUNCTIONAL_ASSESSMENT: 0-10

## 2024-12-31 ASSESSMENT — PAIN SCALES - GENERAL
PAINLEVEL_OUTOF10: 0 - NO PAIN
PAINLEVEL_OUTOF10: 0 - NO PAIN

## 2024-12-31 ASSESSMENT — ACTIVITIES OF DAILY LIVING (ADL): LACK_OF_TRANSPORTATION: NO

## 2024-12-31 NOTE — CARE PLAN
The patient's goals for the shift include  verbalizing adequate comfort level by end of shift.    The clinical goals for the shift include Patient will remain safe and free from falls throughout [facility].    Over the shift, the patient did not make progress toward the following goals. Barriers to progression include arousability. Recommendations to address these barriers include letting patient sleep at night; letting him remain awake during the day.    Problem: Pain - Adult  Goal: Verbalizes/displays adequate comfort level or baseline comfort level  Outcome: Not Progressing     Over the shift, the patient did make progress toward the following goals.       Problem: Skin  Goal: Prevent/manage excess moisture  Outcome: Progressing  Goal: Prevent/minimize sheer/friction injuries  Outcome: Progressing

## 2024-12-31 NOTE — PROGRESS NOTES
TCC spoke with patient's spouse (Wendy Betancourt, [phone]) regarding discharge planning. Spouse wants patient to go to SNF for rehabilitation. Referrals submitted for the following:    [facility]  [facility] ([facility])    1110am- Spouse prefers [facility] if patient is accepted. TCC will continue to follow for discharge planning.      ABRAHAN Ahumada, RN  [facility], [facility]  Transitional Care Coordinator, Mon-Fri  Cell: [phone], Office: [phone]  Email: [email or web address]

## 2024-12-31 NOTE — ASSESSMENT & PLAN NOTE
- Wound Care consulted, recs ordered    Pancreatic Lesion  - CT C/A/P 12/26/24 showed left mid clavicle fx, rib fx, pancreatic lesion mucinosis cystic neoplasm vs serous cystic lesion  - Outpatient MRCP recommended.     Pulmonary Nodules  - CT Chest 12/26/24 showed few scattered pulmonary nodules measuring up to 7mm   - Noncontrast CT in 3-6 months recommended

## 2024-12-31 NOTE — PROGRESS NOTES
Elian Betancourt is a 79 y.o. male on day 5 of admission presenting with Dissection of descending aorta (Multi).    Interval Events:   No acute events overnight     Subjective   Patient seen and assessed this morning, lying in bed, NAD. Denies any CP or SOB, remains on RA. Patient follows commands. Intermittently answers questions. Keeps eyes closed during majority of exam. Sitter remains at bedside.     Today's Plan/Updates:   - HR remains in 50's off Metoprolol, SBP at goal on Lisinopril 5 daily  - spouse agreeable to SNF, per [facility] pending acceptance to facility    Objective     Physical Exam  Constitutional:       General: He is sleeping.      Comments: Frail appearing   HENT:      Mouth/Throat:      Mouth: Mucous membranes are moist.   Cardiovascular:      Rate and Rhythm: Bradycardia present.      Pulses: Normal pulses.   Pulmonary:      Effort: No respiratory distress.      Breath sounds: Normal breath sounds.      Comments: Poor effort, comfortable on RA  Abdominal:      General: Bowel sounds are normal.      Palpations: Abdomen is soft.      Tenderness: There is no abdominal tenderness. There is no guarding.   Musculoskeletal:      Right lower leg: No edema.      Left lower leg: No edema.      Comments: Rigid, weakness   Skin:     General: Skin is warm and dry.      Findings: Bruising present.      Comments: Laceration to left leg   Neurological:      Mental Status: He is easily aroused. Mental status is at baseline. He is disoriented.      Coordination: Coordination abnormal.   Psychiatric:         Behavior: Behavior is slowed. Behavior is cooperative.         Cognition and Memory: Cognition is impaired. Memory is impaired.         Last Recorded Vitals  Blood pressure 111/62, pulse 53, temperature 36.7 °C (98.1 °F), temperature source Temporal, resp. rate 20, weight 74.3 kg (163 lb 12.8 oz), SpO2 95%.  Intake/Output last 3 Shifts:  I/O last 3 completed shifts:  In: 960 (12.9 mL/kg) [P.O.:960]  Out: 1000 (13.5  mL/kg) [Urine:1000 (0.4 mL/kg/hr)]  Weight: 74.3 kg     Relevant Results  Scheduled medications  docusate sodium, 100 mg, oral, Daily  enoxaparin, 40 mg, subcutaneous, q24h  lisinopril, 5 mg, oral, Daily  [Held by provider] metoprolol succinate XL, 12.5 mg, oral, Daily  OLANZapine zydis, 5 mg, oral, Once  perflutren protein A microsphere, 0.5 mL, intravenous, Once in imaging  polyethylene glycol, 17 g, oral, Daily  sulfur hexafluoride microsphr, 2 mL, intravenous, Once in imaging      Continuous medications     PRN medications     Results for orders placed or performed during the hospital encounter of 12/26/24 (from the past 24 hours)   CBC   Result Value Ref Range    WBC 5.8 4.4 - 11.3 x10*3/uL    nRBC 0.0 0.0 - 0.0 /100 WBCs    RBC 2.66 (L) 4.50 - 5.90 x10*6/uL    Hemoglobin 8.3 (L) 13.5 - 17.5 g/dL    Hematocrit 23.9 (L) 41.0 - 52.0 %    MCV 90 80 - 100 fL    MCH 31.2 26.0 - 34.0 pg    MCHC 34.7 32.0 - 36.0 g/dL    RDW 13.5 11.5 - 14.5 %    Platelets 148 (L) 150 - 450 x10*3/uL   Renal function panel   Result Value Ref Range    Glucose 131 (H) 74 - 99 mg/dL    Sodium 140 136 - 145 mmol/L    Potassium 4.2 3.5 - 5.3 mmol/L    Chloride 108 (H) 98 - 107 mmol/L    Bicarbonate 27 21 - 32 mmol/L    Anion Gap 9 (L) 10 - 20 mmol/L    Urea Nitrogen 24 (H) 6 - 23 mg/dL    Creatinine 0.60 0.50 - 1.30 mg/dL    eGFR >90 >60 mL/min/1.73m*2    Calcium 8.6 8.6 - 10.6 mg/dL    Phosphorus 3.3 2.5 - 4.9 mg/dL    Albumin 2.5 (L) 3.4 - 5.0 g/dL   Magnesium   Result Value Ref Range    Magnesium 1.83 1.60 - 2.40 mg/dL   Electrocardiogram, 12-lead PRN ACS symptoms   Result Value Ref Range    Ventricular Rate 87 BPM    Atrial Rate 141 BPM    QRS Duration 146 ms    QT Interval 436 ms    QTC Calculation(Bazett) 524 ms    R Axis -57 degrees    T Axis 108 degrees    QRS Count 14 beats    Q Onset 203 ms    T Offset 421 ms    QTC Fredericia 493 ms   Electrocardiogram, 12-lead PRN ACS symptoms   Result Value Ref Range    Ventricular Rate 56 BPM     Atrial Rate 56 BPM    GA Interval 118 ms    QRS Duration 136 ms    QT Interval 496 ms    QTC Calculation(Bazett) 478 ms    P Axis 50 degrees    R Axis -53 degrees    T Axis 83 degrees    QRS Count 9 beats    Q Onset 219 ms    P Onset 160 ms    P Offset 202 ms    T Offset 467 ms    QTC Fredericia 485 ms      Transthoracic Echo (TTE) Complete   Final Result      CT angio chest abdomen pelvis   Final Result   Type B aortic dissection involving zones 3 through 9 with all of the   primary arteries of the abdomen originating from the true lumen.   Severely displaced, subacute appearing left mid clavicular fracture.   Multiple left-sided nondisplaced subacute rib fractures. Pancreatic   tail multiloculated, macrocystic lesion which could represent   mucinous cystic neoplasm versus serous cystic neoplasm. Nonemergent,   outpatient MRCP could better evaluate if clinically indicated. Other   minor findings as above.        I personally reviewed the images/study and I agree with the findings   as stated by Dr. Michele Martínez. This study was interpreted at   [place], [place].        Signed by: Aubrey Hoyt 12/26/2024 10:38 PM   Dictation workstation:   [id number]           CT Chest 12/26/24  1. Age-indeterminate type B aortic dissection extending from the mid  aortic arch into the visualized portion of the abdominal aorta. There  is associated mild dilatation of the proximal descending aorta  measuring 3.9 cm. The celiac, superior mesenteric and bilateral renal  arteries arise from the true lumen. The true lumen is mildly  compressed by the false lumen.  2. Mild dilatation of the ascending aorta measuring 4.1 cm.  3. Cardiomegaly with severe coronary artery calcifications.  4. Small bilateral pleural effusions with bibasilar areas of  atelectasis.  5. Few scattered small pulmonary nodules measuring up to 7 mm along  the minor fissure. Non-contrast chest CT at 3-6 months is  recommended.  If the nodules are stable at time of repeat CT, then future CT at  18-24 months (from today's scan) is considered optional for low-risk  patients, but is recommended for high-risk patients (Per Fleischner  Society guidelines).  6. Large hiatal hernia with intrathoracic stomach.  7. Hepatic steatosis.  8. Multiple loculated cystic structure in the gastrosplenic ligament  measuring 6.4 cm x 4.8 cm. Findings may be secondary to a pancreatic  pseudocyst, although technically indeterminate.  Recommend follow-up  multiphase contrast-enhanced CT of the abdomen  9. Findings of chronic pancreatitis.    CT Chest/Abdomen/Pelvis 12/26/24  Type B aortic dissection involving zones 3 through 9 with all of the  primary arteries of the abdomen originating from the true lumen.  Severely displaced, subacute appearing left mid clavicular fracture.  Multiple left-sided nondisplaced subacute rib fractures. Pancreatic  tail multiloculated, macrocystic lesion which could represent  mucinous cystic neoplasm versus serous cystic neoplasm. Nonemergent,  outpatient MRCP could better evaluate if clinically indicated. Other  minor findings as above.          Assessment/Plan   Mr. Betancourt is a 79M with PMHx of dementia, DDD, HTN, DLD, recurrent falls who presents to [facility] ED for evaluation of LLE laceration after fall incidentally found to have type B aortic dissection. Transferred to  CICU for further evaluation of dissection. CTA CAP showing type B dissection, bilateral renal arteries, celiac trunk, superior mesenteric artery and inferior mesenteric artery arise from the true lumen.   Assessment & Plan  Dissection of descending aorta (Multi)  Type B Dissection  HTN  - CT Chest 12/26- Age-indeterminate type B aortic dissection extending from the mid aortic arch into the visualized portion of the abdominal aorta. There is associated mild dilatation of theproximal descending aorta measuring 3.9 cm. The celiac, superior mesenteric  and bilateral renal arteries arise from the true lumen. The true lumen is mildly compressed by the false lumen.   - CTA CAP 12/26- type B dissection, bilateral renal arteries, celiac trunk, superior mesenteric artery   and inferior mesenteric artery arise from the true lumen.   - s/p nicardipine drip  - TTE 12/28: EF 63%, probable moderate aortic regurg  - Vascular surgery consult rec conservative therapy with impulse control   - Cardiac surgery consult (given dissection starts at aortic arch) recommends conservative therapy with impulse control   - Impulse control (Goal HR<60, SBP<120), captopril transitioned to lisinopril  - HR remains in 50's off Metoprolol, SBP at goal on Lisinopril 5 mg daily  - no BB given continued Bradycardia with HR in the 50's   - Serial abdominal exams and NV checks   Frailty  Dementia   Recurrent falls  - CTH/C-spine- no evidence for an acute fracture or subluxation of the cervical spine. No acute intracranial pathology.   - Delirium precautions  - per spouse, at baseline patient able to feed himself, ambulate, and use commode  - sitter remains at bedside, mentation wax and wanes  - PO Tylenol PRN for pain  - PT/OT Consult, OOB to chair  - Sleep/wake cycle normalization   - Patient to follow up with PCP regarding cause of recurrent falls     Anemia  - baseline Hgb ~13  - 12/26 Iron 58, UIBC 208, TIBC 266, %Sat 22, ferritin 49  - admit Hgb  11.5, Hgb today: 8.3 (8.6, 8.7, 8.0, 7.6, 8.2, 10.0)   - no s/s of active bleeding  - monitor Hgb daily, follow up with PCP outpatient   - transfuse for Hgb <7    Injury of left lower extremity  - Wound Care consulted, recs ordered    Pancreatic Lesion  - CT C/A/P 12/26/24 showed left mid clavicle fx, rib fx, pancreatic lesion mucinosis cystic neoplasm vs serous cystic lesion  - Outpatient MRCP recommended.     Pulmonary Nodules  - CT Chest 12/26/24 showed few scattered pulmonary nodules measuring up to 7mm   - Noncontrast CT in 3-6 months recommended      Dispo:   Pending impulse control, lives with spouse, daughter is a nurse, and son is involved.    PT/OT rec moderate intensity at discharge, spouse agreeable to SNF  - per T[facility] pending acceptance to facility    DVT ppx: subq Lovenox    Code Status: DNR and No Intubation   NOK: Wendy Betancourt, spouse: [phone]    All labs, vital signs, tests & imaging results, and medications were reviewed.    Seen and discussed with Dr. Evan Rogers, APRN-CNP

## 2024-12-31 NOTE — CARE PLAN
Problem: Skin  Goal: Decreased wound size/increased tissue granulation at next dressing change  Outcome: Progressing  Flowsheets (Taken 12/31/2024 1707)  Decreased wound size/increased tissue granulation at next dressing change:   Promote sleep for wound healing   Protective dressings over bony prominences  Goal: Participates in plan/prevention/treatment measures  Outcome: Progressing  Flowsheets (Taken 12/31/2024 1707)  Participates in plan/prevention/treatment measures:   Elevate heels   Increase activity/out of bed for meals  Goal: Prevent/manage excess moisture  Outcome: Progressing  Flowsheets (Taken 12/31/2024 1707)  Prevent/manage excess moisture:   Moisturize dry skin   Monitor for/manage infection if present  Goal: Prevent/minimize sheer/friction injuries  Outcome: Progressing  Flowsheets (Taken 12/31/2024 1707)  Prevent/minimize sheer/friction injuries:   Use pull sheet   HOB 30 degrees or less   Turn/reposition every 2 hours/use positioning/transfer devices  Goal: Promote/optimize nutrition  Outcome: Progressing  Flowsheets (Taken 12/31/2024 1707)  Promote/optimize nutrition:   Consume > 50% meals/supplements   Offer water/supplements/favorite foods   Monitor/record intake including meals  Goal: Promote skin healing  Outcome: Progressing  Flowsheets (Taken 12/31/2024 1707)  Promote skin healing:   Protective dressings over bony prominences   Turn/reposition every 2 hours/use positioning/transfer devices

## 2024-12-31 NOTE — ASSESSMENT & PLAN NOTE
Type B Dissection  HTN  - CT Chest 12/26- Age-indeterminate type B aortic dissection extending from the mid aortic arch into the visualized portion of the abdominal aorta. There is associated mild dilatation of theproximal descending aorta measuring 3.9 cm. The celiac, superior mesenteric and bilateral renal arteries arise from the true lumen. The true lumen is mildly compressed by the false lumen.   - CTA CAP 12/26- type B dissection, bilateral renal arteries, celiac trunk, superior mesenteric artery   and inferior mesenteric artery arise from the true lumen.   - s/p nicardipine drip  - TTE 12/28: EF 63%, probable moderate aortic regurg  - Vascular surgery consult rec conservative therapy with impulse control   - Cardiac surgery consult (given dissection starts at aortic arch) recommends conservative therapy with impulse control   - Impulse control (Goal HR<60, SBP<120), captopril transitioned to lisinopril  - HR remains in 50's off Metoprolol, SBP at goal on Lisinopril 5 mg daily  - no BB given continued Bradycardia with HR in the 50's   - Serial abdominal exams and NV checks

## 2024-12-31 NOTE — ASSESSMENT & PLAN NOTE
Dementia   Recurrent falls  - CTH/C-spine- no evidence for an acute fracture or subluxation of the cervical spine. No acute intracranial pathology.   - Delirium precautions  - per spouse, at baseline patient able to feed himself, ambulate, and use commode  - sitter remains at bedside, mentation wax and wanes  - PO Tylenol PRN for pain  - PT/OT Consult, OOB to chair  - Sleep/wake cycle normalization   - Patient to follow up with PCP regarding cause of recurrent falls     Anemia  - baseline Hgb ~13  - 12/26 Iron 58, UIBC 208, TIBC 266, %Sat 22, ferritin 49  - admit Hgb  11.5, Hgb today: 8.3 (8.6, 8.7, 8.0, 7.6, 8.2, 10.0)   - no s/s of active bleeding  - monitor Hgb daily, follow up with PCP outpatient   - transfuse for Hgb <7

## 2025-01-01 VITALS
TEMPERATURE: 95.5 F | DIASTOLIC BLOOD PRESSURE: 51 MMHG | WEIGHT: 164.8 LBS | SYSTOLIC BLOOD PRESSURE: 95 MMHG | RESPIRATION RATE: 18 BRPM | OXYGEN SATURATION: 92 % | BODY MASS INDEX: 24.34 KG/M2 | HEART RATE: 57 BPM
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
## 2025-01-01 PROBLEM — F03.90 DEMENTIA: Status: ACTIVE | Noted: 2025-01-01

## 2025-01-01 PROCEDURE — 2500000004 HC RX 250 GENERAL PHARMACY W/ HCPCS (ALT 636 FOR OP/ED): Performed by: NURSE PRACTITIONER

## 2025-01-01 PROCEDURE — 2500000004 HC RX 250 GENERAL PHARMACY W/ HCPCS (ALT 636 FOR OP/ED)

## 2025-01-01 PROCEDURE — 97535 SELF CARE MNGMENT TRAINING: CPT | Mod: GO

## 2025-01-01 PROCEDURE — 2500000001 HC RX 250 WO HCPCS SELF ADMINISTERED DRUGS (ALT 637 FOR MEDICARE OP)

## 2025-01-01 PROCEDURE — 97530 THERAPEUTIC ACTIVITIES: CPT | Mod: GO

## 2025-01-01 PROCEDURE — 99238 HOSP IP/OBS DSCHRG MGMT 30/<: CPT | Performed by: INTERNAL MEDICINE

## 2025-01-01 PROCEDURE — 2500000001 HC RX 250 WO HCPCS SELF ADMINISTERED DRUGS (ALT 637 FOR MEDICARE OP): Performed by: NURSE PRACTITIONER

## 2025-01-01 RX ORDER — POTASSIUM CHLORIDE 1.5 G/1.58G
20 POWDER, FOR SOLUTION ORAL ONCE
Status: COMPLETED | OUTPATIENT
Start: 2025-01-01 | End: 2025-01-01

## 2025-01-01 RX ORDER — LISINOPRIL 10 MG/1
10 TABLET ORAL DAILY
Start: 2025-01-02

## 2025-01-01 RX ORDER — LISINOPRIL 10 MG/1
10 TABLET ORAL DAILY
Status: DISCONTINUED | OUTPATIENT
Start: 2025-01-01 | End: 2025-01-01 | Stop reason: HOSPADM

## 2025-01-01 RX ORDER — LANOLIN ALCOHOL/MO/W.PET/CERES
800 CREAM (GRAM) TOPICAL ONCE
Status: COMPLETED | OUTPATIENT
Start: 2025-01-01 | End: 2025-01-01

## 2025-01-01 RX ADMIN — MAGNESIUM OXIDE TAB 400 MG (241.3 MG ELEMENTAL MG) 800 MG: 400 (241.3 MG) TAB at 08:41

## 2025-01-01 RX ADMIN — LISINOPRIL 10 MG: 10 TABLET ORAL at 08:41

## 2025-01-01 RX ADMIN — DOCUSATE SODIUM 100 MG: 100 CAPSULE, LIQUID FILLED ORAL at 05:30

## 2025-01-01 RX ADMIN — POTASSIUM CHLORIDE 20 MEQ: 1.5 POWDER, FOR SOLUTION ORAL at 08:41

## 2025-01-01 RX ADMIN — POLYETHYLENE GLYCOL 3350 17 G: 17 POWDER, FOR SOLUTION ORAL at 08:41

## 2025-01-01 RX ADMIN — ENOXAPARIN SODIUM 40 MG: 100 INJECTION SUBCUTANEOUS at 17:00

## 2025-01-01 ASSESSMENT — COGNITIVE AND FUNCTIONAL STATUS - GENERAL
DRESSING REGULAR LOWER BODY CLOTHING: A LOT
DAILY ACTIVITIY SCORE: 13
MOVING TO AND FROM BED TO CHAIR: A LOT
STANDING UP FROM CHAIR USING ARMS: A LOT
HELP NEEDED FOR BATHING: A LOT
TURNING FROM BACK TO SIDE WHILE IN FLAT BAD: A LOT
DAILY ACTIVITIY SCORE: 12
PERSONAL GROOMING: A LOT
DRESSING REGULAR UPPER BODY CLOTHING: A LOT
CLIMB 3 TO 5 STEPS WITH RAILING: TOTAL
TOILETING: A LOT
DRESSING REGULAR LOWER BODY CLOTHING: TOTAL
HELP NEEDED FOR BATHING: A LOT
MOVING FROM LYING ON BACK TO SITTING ON SIDE OF FLAT BED WITH BEDRAILS: A LOT
EATING MEALS: A LITTLE
EATING MEALS: A LITTLE
WALKING IN HOSPITAL ROOM: A LOT
PERSONAL GROOMING: A LITTLE
DRESSING REGULAR UPPER BODY CLOTHING: A LOT
TOILETING: TOTAL
MOBILITY SCORE: 11

## 2025-01-01 ASSESSMENT — PAIN SCALES - GENERAL
PAINLEVEL_OUTOF10: 0 - NO PAIN
PAINLEVEL_OUTOF10: 0 - NO PAIN

## 2025-01-01 ASSESSMENT — PAIN - FUNCTIONAL ASSESSMENT
PAIN_FUNCTIONAL_ASSESSMENT: 0-10
PAIN_FUNCTIONAL_ASSESSMENT: 0-10

## 2025-01-01 ASSESSMENT — ACTIVITIES OF DAILY LIVING (ADL): HOME_MANAGEMENT_TIME_ENTRY: 15

## 2025-01-01 NOTE — DISCHARGE INSTRUCTIONS
Mr. Betancourt presented to [facility] ED for evaluation of LLE laceration, while being evaluated for the fall he was found to have a Type B aortic dissection for which he was transferred to [facility] for further evaluation. Both Vascular and Cardiac Surgery were consulted, given patient is asymptomatic and high surgical risk, decision was made to medically manage by controlling his HR and BP. Please follow up with Vascular Surgery with repeat CT angio chest/abdomen/pelvis as scheduled. Please call to reschedule if unable to attend.    Imaging performed also showed a displaced, subacute left mid clavicle fracture, multiple left-sided nondisplaced subacute rib fractures, pancreatic lesion (which could represent  mucinous cystic neoplasm versus serous cystic neoplasm), and a few scattered pulmonary nodules. Outpatient MRCP recommended if clinically indicated, non-contrast chest CT at 3-6 months is recommended. Please follow up with your PCP for further imaging.     Please take the medications listed on these instructions as prescribed until you are seen at a follow up appointment.     It was a pleasure to have met and care for you, Happy New Year!

## 2025-01-01 NOTE — CARE PLAN
Problem: Skin  Goal: Decreased wound size/increased tissue granulation at next dressing change  Outcome: Progressing  Flowsheets (Taken 1/1/2025 1526)  Decreased wound size/increased tissue granulation at next dressing change:   Promote sleep for wound healing   Protective dressings over bony prominences  Goal: Participates in plan/prevention/treatment measures  Outcome: Progressing  Flowsheets (Taken 1/1/2025 1526)  Participates in plan/prevention/treatment measures:   Elevate heels   Increase activity/out of bed for meals  Goal: Prevent/manage excess moisture  Outcome: Progressing  Flowsheets (Taken 1/1/2025 1526)  Prevent/manage excess moisture:   Moisturize dry skin   Monitor for/manage infection if present  Goal: Prevent/minimize sheer/friction injuries  Outcome: Progressing  Flowsheets (Taken 1/1/2025 1526)  Prevent/minimize sheer/friction injuries:   Increase activity/out of bed for meals   Use pull sheet   Turn/reposition every 2 hours/use positioning/transfer devices  Goal: Promote/optimize nutrition  Outcome: Progressing  Flowsheets (Taken 1/1/2025 1526)  Promote/optimize nutrition:   Consume > 50% meals/supplements   Offer water/supplements/favorite foods  Goal: Promote skin healing  Outcome: Progressing  Flowsheets (Taken 1/1/2025 1526)  Promote skin healing:   Turn/reposition every 2 hours/use positioning/transfer devices   Assess skin/pad under line(s)/device(s)

## 2025-01-01 NOTE — PROGRESS NOTES
01/01/25 1330   Discharge Planning   Expected Discharge Disposition SNF   Does the patient need discharge transport arranged? Yes   RoundTrip coordination needed? Yes     TCC notified pt's spouse by phone that both facilities have accepted patient at this time. T[facility] notified [facility] that they are FOC and have asked when the bed will be available. TCC to continue to follow. Verbal acknowledgement of IMM2 letter received over phone.    Addendum 1529- Pt's team agreeable for discharge tonight. [place] agent also willing to accept transfer today. 7000 completed. Nurse,  and team notified that patient will be picked up by [facility] service at 630 pm today. Contact number is [phone].  Number to call report is [phone]. Pt's spouse notified by phone of arrangements for transportation and discharge to SNF today.    Chanda Adams RN  (Weekend Transitional Care Coordinator-TCC, send Epic message)

## 2025-01-01 NOTE — PROGRESS NOTES
Occupational Therapy    Occupational Therapy Treatment    Name: Elian Betancourt  MRN: [id number]  : 1945  Date: 25  Room: [address] [place] [address]      Time Calculation  Start Time: 1154  Stop Time: 1226  Time Calculation (min): 32 min    Assessment:  Barriers to Discharge Home: Caregiver assistance, Physical needs, Cognition needs  Caregiver Assistance: Caregiver assistance needed per identified barriers - however, level of patient's required assistance exceeds assistance available at home  Cognition Needs: 24hr supervision for safety awareness needed, Cognition-related high falls risk, Insight of patient limited regarding functional ability/needs  Physical Needs: 24hr mobility assistance needed, 24hr ADL assistance needed  Evaluation/Treatment Tolerance: Patient tolerated treatment well  Medical Staff Made Aware: Yes  End of Session Communication: Bedside nurse  End of Session Patient Position: Bed, 3 rail up, Alarm off, not on at start of session  Plan:  Treatment Interventions: ADL retraining, Functional transfer training, UE strengthening/ROM, Endurance training, Cognitive reorientation, Neuromuscular reeducation, Fine motor coordination activities, Compensatory technique education  OT Frequency: 2 times per week  OT Discharge Recommendations: Moderate intensity level of continued care, 24 hr supervision due to cognition  Equipment Recommended upon Discharge:  (TBD)  OT Recommended Transfer Status: Maximum assist, Assist of 2  OT - OK to Discharge: Yes    Subjective   General:  OT Last Visit  OT Received On: 25  Reason for Referral: Presented to OSH for bleeding leg laceration; transferred to [facility] for type B dissection  Past Medical History Relevant to Rehab: dementia, DDD, HTN, DLD, recurrent falls  Prior to Session Communication: Bedside nurse  Patient Position Received: Bed, 4 rail up, Alarm off, caregiver present  Family/Caregiver Present: No  General Comment: Pt pleasantly confused and agreeable to  therapy. Pt conversational but only oriented to self. Pt tolerated sitting EOB with CGA and sit <> stand with Max A x 1   Precautions:  Medical Precautions: Cardiac precautions, Fall precautions  Precautions Comment: Impulse control: HR<60, SBP<120  Vitals:  HR 50-62    Lines/Tubes/Drains:  External Urinary Catheter (Active)   Number of days: 5       Cognition:  Overall Cognitive Status: Impaired, Impaired at baseline  Arousal/Alertness: Appropriate responses to stimuli  Orientation Level: Disoriented to place, Disoriented to time, Disoriented to situation (only oriented to self)  Following Commands: Follows one step commands with increased time (followed 90% simple commands)  Cognition Comments: Pt pleasantly confused and only oriented to self. Conversational during session but making statements that did not make sense. Pt cooperative for session completing grooming tasks bed level and sitting EOB.  Insight: Severe  Impulsive: Within functional limits  Processing Speed: Delayed    Pain Assessment:  Pain Assessment  Pain Assessment: 0-10  0-10 (Numeric) Pain Score: 0 - No pain     Objective   Activities of Daily Living:      Grooming  Grooming Level of Assistance: Minimum assistance, Setup, Moderate assistance  Grooming Where Assessed: Bed level  Grooming Comments: Pt washed face with Mod A needed for thoroughness of task with cues for initiation and completion of task. Pt completed oral hygiene management brushing teeth in bed with set-up and CGA. Pt required therapist to open toothpaste and set-up onto toothbrush. Pt able to use B hands to hold toothbrush and brush teeth, required assist to switch toothbrush for cup to rinse and spit but able to grasp cup with SBA-CGA with R hand. Increased time for completion of task.                Bed Mobility/Transfers:   Bed Mobility  Bed Mobility: Yes  Bed Mobility 1  Bed Mobility 1: Supine to sitting, Sitting to supine  Level of Assistance 1: Maximum assistance (x 1  assist)  Bed Mobility Comments 1: HOB elevated, drawsheet used. Pt able to assist in walking B LE towards EOB slightly and reach for bedrail with L UE, cues provided for initiation and sequencing.  Transfers  Transfer: Yes  Transfer 1  Technique 1: Sit to stand, Stand to sit  Transfer Device 1:  (B arm in arm assist with LE blocking)  Transfer Level of Assistance 1: Maximum assistance (x 1 assist)  Trials/Comments 1: completed 2 trials with cues for UE/LE placement and sequencing. Completed squat pivot towards HOB x 2 trials to assist in getting pt higher in bed. Pt required Max A with therapist positioned in front of pt and blocking R LE for transfer. Pt achieved partial stand ~ 25-50% stand.                Balance:  Dynamic Sitting Balance  Dynamic Sitting-Level of Assistance: Contact guard  Static Sitting Balance  Static Sitting-Level of Assistance: Contact guard  Static Standing Balance  Static Standing-Level of Assistance: Maximum assistance    Therapy/Activity:      Therapeutic Activity  Therapeutic Activity Performed: Yes  Therapeutic Activity 1: Pt sat EOB x 15 min with CGA and flexed kyphotic posture and protracted shoulders and cervical flexion.         Outcome Measures:  [facility] Daily Activity  Putting on and taking off regular lower body clothing: Total  Bathing (including washing, rinsing, drying): A lot  Putting on and taking off regular upper body clothing: A lot  Toileting, which includes using toilet, bedpan or urinal: Total  Taking care of personal grooming such as brushing teeth: A little  Eating Meals: A little  Daily Activity - Total Score: 12     Education Documentation  Body Mechanics, taught by Amanda Velasquez OT at 1/1/2025  1:21 PM.  Learner: Patient  Readiness: Acceptance  Method: Demonstration, Explanation  Response: Needs Reinforcement  Comment: OT POC    Precautions, taught by Amanda Velasquez OT at 1/1/2025  1:21 PM.  Learner: Patient  Readiness: Acceptance  Method: Demonstration,  Explanation  Response: Needs Reinforcement  Comment: OT POC    ADL Training, taught by Amanda Velasquez OT at 1/1/2025  1:21 PM.  Learner: Patient  Readiness: Acceptance  Method: Demonstration, Explanation  Response: Needs Reinforcement  Comment: OT POC    Education Comments  No comments found.        Goals:  Encounter Problems       Encounter Problems (Active)       ADLs       Patient will complete grooming tasks with CGA in order to maximize functional Indep with task completion.  (Progressing)       Start:  12/27/24    Expected End:  01/10/25               BALANCE       Pt will increase static/dynamic sit/stand to Good to increase safety and indep with functional task completion.   (Progressing)       Start:  12/27/24    Expected End:  01/10/25               COGNITION/SAFETY       Pt will follow simple, one step command accurately 50% of the time throughout duration of treatment, demonstrating improved cognition for participation in functional tasks.  (Progressing)       Start:  12/27/24    Expected End:  01/10/25               EXERCISE/STRENGTHENING       Pt will increase overall BUE strength to 4/5 in order to increase functional task participation.  (Progressing)       Start:  12/27/24    Expected End:  01/10/25            Pt will increase endurance to tolerate 15-20min of activity with no more than 1 rest break in order to increase ability to engage in ADL completion.  (Progressing)       Start:  12/27/24    Expected End:  01/10/25               TRANSFERS       Patient will complete functional transfers using least restrictive device with  CGA  in order to maximize functional potential and increase safety.  (Progressing)       Start:  12/27/24    Expected End:  01/10/25                     01/01/25 at 1:22 PM   Amanda Velasquez, OT   [phone]

## 2025-01-01 NOTE — ASSESSMENT & PLAN NOTE
Dementia   Recurrent falls  - CTH/C-spine- no evidence for an acute fracture or subluxation of the cervical spine. No acute intracranial pathology.   - Delirium precautions  - per spouse, at baseline patient able to feed himself, ambulate, and use commode  - sitter free since 12/31 at 1500  - mentation wax and wanes, awake and alert today  - PO Tylenol PRN for pain  - PT/OT Consult, OOB to chair  - Sleep/wake cycle normalization   - Patient to follow up with PCP regarding cause of recurrent falls     Acute Anemia  - baseline Hgb ~13  - 12/26 Iron 58, UIBC 208, TIBC 266, %Sat 22, ferritin 49  - admit Hgb  11.5, Hgb today: 8.9 (8.38.6, 8.7, 8.0, 7.6, 8.2, 10.0)   - no s/s of active bleeding  - monitor Hgb daily, follow up with PCP outpatient   - transfuse for Hgb <7

## 2025-01-01 NOTE — ASSESSMENT & PLAN NOTE
Type B Dissection  HTN  - CT Chest 12/26- Age-indeterminate type B aortic dissection extending from the mid aortic arch into the visualized portion of the abdominal aorta. There is associated mild dilatation of theproximal descending aorta measuring 3.9 cm. The celiac, superior mesenteric and bilateral renal arteries arise from the true lumen. The true lumen is mildly compressed by the false lumen.   - CTA CAP 12/26- type B dissection, bilateral renal arteries, celiac trunk, superior mesenteric artery   and inferior mesenteric artery arise from the true lumen.   - s/p nicardipine drip  - TTE 12/28: EF 63%, probable moderate aortic regurg  - Vascular surgery consult rec conservative therapy with impulse control   - Cardiac surgery consult (given dissection starts at aortic arch) recommends conservative therapy with impulse control   - Impulse control (Goal HR<60, SBP<120), captopril transitioned to lisinopril, previously on metoprolol  - -130, increase Lisinopril to 10 mg daily  - no BB given continued Bradycardia with HR in the 50's   - Serial abdominal exams and NV checks

## 2025-01-01 NOTE — PROGRESS NOTES
Physical Therapy                 Therapy Communication Note    Patient Name: Elian Betancourt  MRN: [id number]  Department: [place] [address]  Room: [facility]  Today's Date: 1/1/2025     Discipline: Physical Therapy    PT Missed Visit: Yes     Missed Visit Reason: Missed Visit Reason:  (Patient eating lunch, will check back for PT treat)    Missed Time: Attempt    Comment:

## 2025-01-01 NOTE — DISCHARGE SUMMARY
Discharge Diagnosis  Dissection of descending aorta (Multi)    Issues Requiring Follow-Up  See below    Test Results Pending At Discharge  Pending Labs       No current pending labs.          Hospital Course  Mr. Betancourt is a 79M with PMHx of dementia, DDD, HTN, DLD, recurrent falls who presents to [facility] ED for evaluation of LLE laceration after fall incidentally found to have type B aortic dissection. Transferred to  CICU for further evaluation of dissection. CTA CAP showing type B dissection, bilateral renal arteries, celiac trunk, superior mesenteric artery and inferior mesenteric artery arise from the true lumen.     CICU Course:   Patient started on Nicardipine drip, transitioned to captopril and Metoprolol.     Cardiac Surgery (given dissection starts at aortic arch) and Vascular Surgery both consulted. Given patient asymptomatic, chronic appearing on imaging, and high surgical risk, decision made to continue with impulse control and follow up outpatient with Vascular Surgery in 1 month with repeat CT angio chest/abdomen/pelvis.     Patient transferred to [facility] for further management.    HVI Course:  Metoprolol discontinued given bradycardia. Patient remained SB with HR 50's. Captopril transitioned to Lisinopril, dose optimized as indicated.     Patient with acute anemia of unclear etiology, no s/s of active bleeding, iron studies wnl. Hgb stable at 8.9 at discharge.    CT C/A/P 12/26/24 showed a displaced, subacute left mid clavicle fracture, multiple left-sided nondisplaced subacute rib fractures, pancreatic lesion (which could represent mucinous cystic neoplasm versus serous cystic neoplasm), outpatient MRCP recommended if clinically indicated.     CT Chest 12/26/24 showed few scattered pulmonary nodules measuring up to 7mm, non-contrast chest CT in  3-6 months is recommended.     PT/OT recommended Moderate intensity, patient's family agreeable.    Patient denied having any other home going needs. Follow  up appointments scheduled.     Upon review of medications, lab values, and vital signs. Pt was deemed stable for discharge in satisfactory condition.     Discharge weight: 74.8 kg (Bed weight)     More than 60 minutes were spent in coordinating patient discharge.      Physical Exam At Time of Discharge  Constitutional:       General: He is awake. He is not in acute distress.     Comments: Frail appearing   HENT:      Mouth/Throat:      Mouth: Mucous membranes are moist.   Cardiovascular:      Rate and Rhythm: Bradycardia present.      Pulses: Normal pulses.   Pulmonary:      Effort: No respiratory distress.      Breath sounds: Normal breath sounds.      Comments: Poor effort, comfortable on RA  Abdominal:      General: Bowel sounds are normal.      Palpations: Abdomen is soft.      Tenderness: There is no abdominal tenderness. There is no guarding.   Musculoskeletal:      Right lower leg: No edema.      Left lower leg: No edema.      Comments: Rigid, weakness   Skin:     General: Skin is warm and dry.      Findings: Bruising present.      Comments: Laceration to left leg   Neurological:      Mental Status: He is alert. Mental status is at baseline. He is disoriented.      Coordination: Coordination abnormal.   Psychiatric:         Behavior: Behavior is slowed. Behavior is cooperative.         Cognition and Memory: Cognition is impaired. Memory is impaired.     Home Medications     Medication List      START taking these medications     lisinopril 10 mg tablet; Take 1 tablet (10 mg) by mouth once daily.;   Start taking on: January 2, 2025       Outpatient Follow-Up  Future Appointments   Date Time Provider Department Center   1/29/2025  3:30 PM FRANKIE CT 1 WESCT [facility]   1/29/2025  4:20 PM Tamanna Collier MD [id number] [facility]   4/17/2025  2:30 PM Warren Mascorro MD [id number] [facility]       Leander Rogers, APRN-CNP

## 2025-01-01 NOTE — PROGRESS NOTES
Elian Betancourt is a 79 y.o. male on day 6 of admission presenting with Dissection of descending aorta (Multi).    Interval Events:   No acute events overnight     Subjective   Patient seen and assessed this morning, lying in bed, NAD. Denies any CP or SOB, remains on RA. Much more awake and alert today, interactive, responsive and feeding himself. No sitter since 12/31 at 1500. Spouse updated via telephone    Today's Plan/Updates:   - -130, increase Lisinopril to 10 mg daily, HR remains 50's  - per TCC accepted to SNF, pending facility bed availability and transportation arrangement possibly discharge today vs tomorrow    Objective     Physical Exam  Constitutional:       General: He is awake. He is not in acute distress.     Comments: Frail appearing   HENT:      Mouth/Throat:      Mouth: Mucous membranes are moist.   Cardiovascular:      Rate and Rhythm: Bradycardia present.      Pulses: Normal pulses.   Pulmonary:      Effort: No respiratory distress.      Breath sounds: Normal breath sounds.      Comments: Poor effort, comfortable on RA  Abdominal:      General: Bowel sounds are normal.      Palpations: Abdomen is soft.      Tenderness: There is no abdominal tenderness. There is no guarding.   Musculoskeletal:      Right lower leg: No edema.      Left lower leg: No edema.      Comments: Rigid, weakness   Skin:     General: Skin is warm and dry.      Findings: Bruising present.      Comments: Laceration to left leg   Neurological:      Mental Status: He is alert. Mental status is at baseline. He is disoriented.      Coordination: Coordination abnormal.   Psychiatric:         Behavior: Behavior is slowed. Behavior is cooperative.         Cognition and Memory: Cognition is impaired. Memory is impaired.         Last Recorded Vitals  Blood pressure 115/64, pulse 60, temperature 36.8 °C (98.2 °F), temperature source Temporal, resp. rate 18, weight 74.8 kg (164 lb 12.8 oz), SpO2 97%.  Intake/Output last 3  Shifts:  I/O last 3 completed shifts:  In: 340 (4.5 mL/kg) [P.O.:340]  Out: 1050 (14 mL/kg) [Urine:1050 (0.4 mL/kg/hr)]  Weight: 74.8 kg     Relevant Results  Scheduled medications  docusate sodium, 100 mg, oral, Daily  enoxaparin, 40 mg, subcutaneous, q24h  lisinopril, 10 mg, oral, Daily  perflutren protein A microsphere, 0.5 mL, intravenous, Once in imaging  polyethylene glycol, 17 g, oral, Daily  sulfur hexafluoride microsphr, 2 mL, intravenous, Once in imaging      Continuous medications     PRN medications     Results for orders placed or performed during the hospital encounter of 12/26/24 (from the past 24 hours)   CBC   Result Value Ref Range    WBC 5.2 4.4 - 11.3 x10*3/uL    nRBC 0.0 0.0 - 0.0 /100 WBCs    RBC 2.82 (L) 4.50 - 5.90 x10*6/uL    Hemoglobin 8.9 (L) 13.5 - 17.5 g/dL    Hematocrit 26.1 (L) 41.0 - 52.0 %    MCV 93 80 - 100 fL    MCH 31.6 26.0 - 34.0 pg    MCHC 34.1 32.0 - 36.0 g/dL    RDW 13.5 11.5 - 14.5 %    Platelets 154 150 - 450 x10*3/uL   Renal function panel   Result Value Ref Range    Glucose 129 (H) 74 - 99 mg/dL    Sodium 140 136 - 145 mmol/L    Potassium 3.9 3.5 - 5.3 mmol/L    Chloride 106 98 - 107 mmol/L    Bicarbonate 27 21 - 32 mmol/L    Anion Gap 11 10 - 20 mmol/L    Urea Nitrogen 22 6 - 23 mg/dL    Creatinine 0.60 0.50 - 1.30 mg/dL    eGFR >90 >60 mL/min/1.73m*2    Calcium 8.3 (L) 8.6 - 10.6 mg/dL    Phosphorus 3.4 2.5 - 4.9 mg/dL    Albumin 2.5 (L) 3.4 - 5.0 g/dL   Magnesium   Result Value Ref Range    Magnesium 1.90 1.60 - 2.40 mg/dL      Transthoracic Echo (TTE) Complete   Final Result      CT angio chest abdomen pelvis   Final Result   Type B aortic dissection involving zones 3 through 9 with all of the   primary arteries of the abdomen originating from the true lumen.   Severely displaced, subacute appearing left mid clavicular fracture.   Multiple left-sided nondisplaced subacute rib fractures. Pancreatic   tail multiloculated, macrocystic lesion which could represent    mucinous cystic neoplasm versus serous cystic neoplasm. Nonemergent,   outpatient MRCP could better evaluate if clinically indicated. Other   minor findings as above.        I personally reviewed the images/study and I agree with the findings   as stated by Dr. Michele Martínez. This study was interpreted at   [facility], [place], [place].        Signed by: Aubrey Hoyt 12/26/2024 10:38 PM   Dictation workstation:   [id number]           CT Chest 12/26/24  1. Age-indeterminate type B aortic dissection extending from the mid  aortic arch into the visualized portion of the abdominal aorta. There  is associated mild dilatation of the proximal descending aorta  measuring 3.9 cm. The celiac, superior mesenteric and bilateral renal  arteries arise from the true lumen. The true lumen is mildly  compressed by the false lumen.  2. Mild dilatation of the ascending aorta measuring 4.1 cm.  3. Cardiomegaly with severe coronary artery calcifications.  4. Small bilateral pleural effusions with bibasilar areas of  atelectasis.  5. Few scattered small pulmonary nodules measuring up to 7 mm along  the minor fissure. Non-contrast chest CT at 3-6 months is recommended.  If the nodules are stable at time of repeat CT, then future CT at  18-24 months (from today's scan) is considered optional for low-risk  patients, but is recommended for high-risk patients (Per Fleischner  Society guidelines).  6. Large hiatal hernia with intrathoracic stomach.  7. Hepatic steatosis.  8. Multiple loculated cystic structure in the gastrosplenic ligament  measuring 6.4 cm x 4.8 cm. Findings may be secondary to a pancreatic  pseudocyst, although technically indeterminate.  Recommend follow-up  multiphase contrast-enhanced CT of the abdomen  9. Findings of chronic pancreatitis.    CT Chest/Abdomen/Pelvis 12/26/24  Type B aortic dissection involving zones 3 through 9 with all of the  primary arteries of the abdomen  originating from the true lumen.  Severely displaced, subacute appearing left mid clavicular fracture.  Multiple left-sided nondisplaced subacute rib fractures. Pancreatic  tail multiloculated, macrocystic lesion which could represent  mucinous cystic neoplasm versus serous cystic neoplasm. Nonemergent,  outpatient MRCP could better evaluate if clinically indicated. Other  minor findings as above.          Assessment/Plan   Mr. Betancourt is a 79M with PMHx of dementia, DDD, HTN, DLD, recurrent falls who presents to [facility] ED for evaluation of LLE laceration after fall incidentally found to have type B aortic dissection. Transferred to  CICU for further evaluation of dissection. CTA CAP showing type B dissection, bilateral renal arteries, celiac trunk, superior mesenteric artery and inferior mesenteric artery arise from the true lumen.   Assessment & Plan  Dissection of descending aorta (Multi)  Type B Dissection  HTN  - CT Chest 12/26- Age-indeterminate type B aortic dissection extending from the mid aortic arch into the visualized portion of the abdominal aorta. There is associated mild dilatation of theproximal descending aorta measuring 3.9 cm. The celiac, superior mesenteric and bilateral renal arteries arise from the true lumen. The true lumen is mildly compressed by the false lumen.   - CTA CAP 12/26- type B dissection, bilateral renal arteries, celiac trunk, superior mesenteric artery   and inferior mesenteric artery arise from the true lumen.   - s/p nicardipine drip  - TTE 12/28: EF 63%, probable moderate aortic regurg  - Vascular surgery consult rec conservative therapy with impulse control   - Cardiac surgery consult (given dissection starts at aortic arch) recommends conservative therapy with impulse control   - Impulse control (Goal HR<60, SBP<120), captopril transitioned to lisinopril, previously on metoprolol  - -130, increase Lisinopril to 10 mg daily  - no BB given continued Bradycardia with  HR in the 50's   - Serial abdominal exams and NV checks   Frailty  Dementia   Recurrent falls  - CTH/C-spine- no evidence for an acute fracture or subluxation of the cervical spine. No acute intracranial pathology.   - Delirium precautions  - per spouse, at baseline patient able to feed himself, ambulate, and use commode  - sitter free since 12/31 at 1500  - mentation wax and wanes, awake and alert today  - PO Tylenol PRN for pain  - PT/OT Consult, OOB to chair  - Sleep/wake cycle normalization   - Patient to follow up with PCP regarding cause of recurrent falls     Acute Anemia  - baseline Hgb ~13  - 12/26 Iron 58, UIBC 208, TIBC 266, %Sat 22, ferritin 49  - admit Hgb  11.5, Hgb today: 8.9 (8.38.6, 8.7, 8.0, 7.6, 8.2, 10.0)   - no s/s of active bleeding  - monitor Hgb daily, follow up with PCP outpatient   - transfuse for Hgb <7    Injury of left lower extremity  - Wound Care consulted, recs ordered    Pancreatic Lesion  - CT C/A/P 12/26/24 showed left mid clavicle fx, rib fx, pancreatic lesion mucinosis cystic neoplasm vs serous cystic lesion  - Outpatient MRCP recommended.     Pulmonary Nodules  - CT Chest 12/26/24 showed few scattered pulmonary nodules measuring up to 7mm   - Noncontrast CT in 3-6 months recommended     Dispo:   Pending impulse control, lives with spouse, daughter is a nurse, and son is involved.    PT/OT rec moderate intensity at discharge, spouse agreeable to SNF  - per TCC accepted to SNF, pending facility bed availability and transportation arrangement possibly discharge today vs tomorrow    DVT ppx: subq Lovenox    Code Status: DNR and No Intubation   NOK: Wendy Betancourt, spouse: [phone]    All labs, vital signs, tests & imaging results, and medications were reviewed.    Seen and discussed with Dr. Diego Rogers, APRN-CNP

## 2025-01-03 LAB
ATRIAL RATE: 56 BPM
P AXIS: 50 DEGREES
P OFFSET: 202 MS
P ONSET: 160 MS
PR INTERVAL: 118 MS
Q ONSET: 219 MS
QRS COUNT: 9 BEATS
QRS DURATION: 136 MS
QT INTERVAL: 496 MS
QTC CALCULATION(BAZETT): 478 MS
QTC FREDERICIA: 485 MS
R AXIS: -53 DEGREES
T AXIS: 83 DEGREES
T OFFSET: 467 MS
VENTRICULAR RATE: 56 BPM

## 2025-01-05 ENCOUNTER — NURSING HOME VISIT (OUTPATIENT)
Dept: POST ACUTE CARE | Facility: EXTERNAL LOCATION | Age: 80
End: 2025-01-05
Payer: MEDICARE

## 2025-01-05 DIAGNOSIS — N40.0 BENIGN PROSTATIC HYPERPLASIA, UNSPECIFIED WHETHER LOWER URINARY TRACT SYMPTOMS PRESENT: ICD-10-CM

## 2025-01-05 DIAGNOSIS — R53.1 WEAKNESS: ICD-10-CM

## 2025-01-05 DIAGNOSIS — Z87.81 H/O CLAVICLE FRACTURE: ICD-10-CM

## 2025-01-05 DIAGNOSIS — S22.49XD CLOSED FRACTURE OF MULTIPLE RIBS WITH ROUTINE HEALING, UNSPECIFIED LATERALITY, SUBSEQUENT ENCOUNTER: ICD-10-CM

## 2025-01-05 DIAGNOSIS — I71.00 DISSECTION OF AORTA, UNSPECIFIED PORTION OF AORTA (MULTI): Primary | ICD-10-CM

## 2025-01-05 DIAGNOSIS — S81.819D LACERATION OF LOWER EXTREMITY, UNSPECIFIED LATERALITY, SUBSEQUENT ENCOUNTER: ICD-10-CM

## 2025-01-05 DIAGNOSIS — I10 HYPERTENSION, UNSPECIFIED TYPE: ICD-10-CM

## 2025-01-05 DIAGNOSIS — M19.90 OSTEOARTHRITIS, UNSPECIFIED OSTEOARTHRITIS TYPE, UNSPECIFIED SITE: ICD-10-CM

## 2025-01-05 DIAGNOSIS — Z91.81 AT RISK FOR FALLING: ICD-10-CM

## 2025-01-05 DIAGNOSIS — I48.91 ATRIAL FIBRILLATION, UNSPECIFIED TYPE (MULTI): ICD-10-CM

## 2025-01-05 DIAGNOSIS — E78.5 HYPERLIPIDEMIA, UNSPECIFIED HYPERLIPIDEMIA TYPE: ICD-10-CM

## 2025-01-05 DIAGNOSIS — F03.90 DEMENTIA, UNSPECIFIED DEMENTIA SEVERITY, UNSPECIFIED DEMENTIA TYPE, UNSPECIFIED WHETHER BEHAVIORAL, PSYCHOTIC, OR MOOD DISTURBANCE OR ANXIETY (MULTI): ICD-10-CM

## 2025-01-05 PROCEDURE — 99306 1ST NF CARE HIGH MDM 50: CPT | Performed by: INTERNAL MEDICINE

## 2025-01-05 NOTE — LETTER
Patient: Elian Betancourt  : 1945    Encounter Date: 2025    PLACE OF SERVICE:  [facility]    This is new/initial history and physical.    Subjective  Patient ID: Elian Betancourt is a 79 y.o. male who presents for Annual Exam and new/initial history.    Mr. Elian Betancourt is an 79-year-old male with a history of dementia with recent fall and leg laceration.  He was found to have an aortic dissection.  He has undergone Vascular Surgery consult.  He requires supportive care.    Review of Systems   Constitutional:  Negative for chills and fever.   Cardiovascular:  Negative for chest pain.   All other systems reviewed and are negative.    Objective  /76   Pulse 76   Temp 36.6 °C (97.9 °F)   Resp 16     Physical Exam  Vitals reviewed.   Constitutional:       Comments: This is a well-developed and well-nourished male, lying in bed, appearing weak.   HENT:      Right Ear: Tympanic membrane, ear canal and external ear normal.      Left Ear: Tympanic membrane, ear canal and external ear normal.   Eyes:      General: No scleral icterus.     Pupils: Pupils are equal, round, and reactive to light.   Neck:      Vascular: No carotid bruit.   Cardiovascular:      Heart sounds: Normal heart sounds, S1 normal and S2 normal. No murmur heard.     No friction rub.   Pulmonary:      Effort: Pulmonary effort is normal.      Breath sounds: Normal breath sounds and air entry.   Abdominal:      Palpations: There is no hepatomegaly, splenomegaly or mass.   Musculoskeletal:         General: No swelling or deformity. Normal range of motion.      Cervical back: Neck supple.      Right lower leg: No edema.      Left lower leg: No edema.      Comments: The patient's leg showed wound care employed.   Lymphadenopathy:      Cervical: No cervical adenopathy.      Upper Body:      Right upper body: No axillary adenopathy.      Left upper body: No axillary adenopathy.      Lower Body: No right inguinal  adenopathy. No left inguinal adenopathy.   Neurological:      Mental Status: He is lethargic.      Cranial Nerves: Cranial nerves 2-12 are intact. No cranial nerve deficit.      Sensory: No sensory deficit.      Motor: Motor function is intact. No weakness.      Gait: Gait is intact.      Deep Tendon Reflexes: Reflexes normal.      Comments: The patient is oriented x2.   Psychiatric:         Mood and Affect: Mood normal. Mood is not anxious or depressed. Affect is not angry.         Behavior: Behavior is not agitated.         Thought Content: Thought content normal.         Judgment: Judgment normal.     LAB WORK: Laboratory studies were reviewed.    Assessment/Plan  Problem List Items Addressed This Visit             ICD-10-CM       Cardiac and Vasculature    Hypertension (Chronic) I10       Genitourinary and Reproductive    Benign prostatic hyperplasia N40.0       Neuro    Dementia F03.90     Other Visit Diagnoses         Codes    Dissection of aorta, unspecified portion of aorta (Multi)    -  Primary I71.00    Laceration of lower extremity, unspecified laterality, subsequent encounter     S81.819D    Closed fracture of multiple ribs with routine healing, unspecified laterality, subsequent encounter     S22.49XD    H/O clavicle fracture     Z87.81    Hyperlipidemia, unspecified hyperlipidemia type     E78.5    Osteoarthritis, unspecified osteoarthritis type, unspecified site     M19.90    Weakness     R53.1    At risk for falling     Z91.81        1. Aortic dissection.  Follow-up with Vascular Surgery.  2. Dementia, unchanged.  3. Leg laceration.  Continue wound care.  4. Rib fractures, on pain control.  5. Clavicle fracture, on pain control.  6. Hypertension, med controlled.  7. Hyperlipidemia, on statin.  8. BPH, on tamsulosin.  9. Osteoarthritis, on Tylenol.  10. Weakness, on PT/OT.  11. Fall risk, on fall precautions.    Scribe Attestation  By signing my name below, IReny, Scribdickson attest that this  documentation has been prepared under the direction and in the presence of Yvonne Goldman MD.     All medical record entries made by the scribe were personally dictated by me I have reviewed the chart and agree the record accurately reflects my personal performance of his history physical examination and management      Electronically Signed By: Yvonne Goldman MD   1/16/25  1:30 AM

## 2025-01-06 LAB
ATRIAL RATE: 141 BPM
ATRIAL RATE: 43 BPM
P AXIS: 62 DEGREES
P OFFSET: 190 MS
P ONSET: 129 MS
PR INTERVAL: 152 MS
Q ONSET: 203 MS
Q ONSET: 205 MS
QRS COUNT: 14 BEATS
QRS COUNT: 7 BEATS
QRS DURATION: 144 MS
QRS DURATION: 146 MS
QT INTERVAL: 436 MS
QT INTERVAL: 524 MS
QTC CALCULATION(BAZETT): 442 MS
QTC CALCULATION(BAZETT): 524 MS
QTC FREDERICIA: 468 MS
QTC FREDERICIA: 493 MS
R AXIS: -36 DEGREES
R AXIS: -57 DEGREES
T AXIS: 108 DEGREES
T AXIS: 74 DEGREES
T OFFSET: 421 MS
T OFFSET: 467 MS
VENTRICULAR RATE: 43 BPM
VENTRICULAR RATE: 87 BPM

## 2025-01-12 ENCOUNTER — NURSING HOME VISIT (OUTPATIENT)
Dept: POST ACUTE CARE | Facility: EXTERNAL LOCATION | Age: 80
End: 2025-01-12
Payer: MEDICARE

## 2025-01-12 DIAGNOSIS — I71.00 DISSECTION OF AORTA, UNSPECIFIED PORTION OF AORTA (MULTI): ICD-10-CM

## 2025-01-12 DIAGNOSIS — Z91.81 AT RISK FOR FALLING: ICD-10-CM

## 2025-01-12 DIAGNOSIS — S81.819D LACERATION OF LOWER EXTREMITY, UNSPECIFIED LATERALITY, SUBSEQUENT ENCOUNTER: ICD-10-CM

## 2025-01-12 DIAGNOSIS — R45.6 VIOLENT BEHAVIOR: ICD-10-CM

## 2025-01-12 DIAGNOSIS — S22.49XD CLOSED FRACTURE OF MULTIPLE RIBS WITH ROUTINE HEALING, UNSPECIFIED LATERALITY, SUBSEQUENT ENCOUNTER: ICD-10-CM

## 2025-01-12 DIAGNOSIS — R45.1 AGITATION: Primary | ICD-10-CM

## 2025-01-12 DIAGNOSIS — E78.5 HYPERLIPIDEMIA, UNSPECIFIED HYPERLIPIDEMIA TYPE: ICD-10-CM

## 2025-01-12 DIAGNOSIS — M19.90 OSTEOARTHRITIS, UNSPECIFIED OSTEOARTHRITIS TYPE, UNSPECIFIED SITE: ICD-10-CM

## 2025-01-12 DIAGNOSIS — N40.0 BENIGN PROSTATIC HYPERPLASIA, UNSPECIFIED WHETHER LOWER URINARY TRACT SYMPTOMS PRESENT: ICD-10-CM

## 2025-01-12 DIAGNOSIS — F03.90 DEMENTIA, UNSPECIFIED DEMENTIA SEVERITY, UNSPECIFIED DEMENTIA TYPE, UNSPECIFIED WHETHER BEHAVIORAL, PSYCHOTIC, OR MOOD DISTURBANCE OR ANXIETY (MULTI): ICD-10-CM

## 2025-01-12 DIAGNOSIS — Z87.81 H/O CLAVICLE FRACTURE: ICD-10-CM

## 2025-01-12 DIAGNOSIS — I10 HYPERTENSION, UNSPECIFIED TYPE: ICD-10-CM

## 2025-01-12 PROCEDURE — 99309 SBSQ NF CARE MODERATE MDM 30: CPT | Performed by: INTERNAL MEDICINE

## 2025-01-12 NOTE — LETTER
Patient: Elian Betancourt  : 1945    Encounter Date: 2025    PLACE OF SERVICE:  [facility]    This is a subsequent visit.    Subjective  Patient ID: Elian Betancourt is a 79 y.o. male who presents for Follow-up.    Mr. Elian Betancourt is an 79-year-old male with a history of dementia with aortic dissection.  He has had a recent episode of agitation with violent behavior.  He requires supportive care.    Review of Systems   Constitutional:  Negative for chills and fever.   Cardiovascular:  Negative for chest pain.   All other systems reviewed and are negative.    Objective  /80   Pulse 82   Temp 36.6 °C (97.9 °F)   Resp 16     Physical Exam  Vitals reviewed.   Constitutional:       General: He is not in acute distress.     Comments: This is a well-developed and well-nourished male, sitting in a chair   HENT:      Right Ear: Tympanic membrane, ear canal and external ear normal.      Left Ear: Tympanic membrane, ear canal and external ear normal.   Eyes:      General: No scleral icterus.     Pupils: Pupils are equal, round, and reactive to light.   Neck:      Vascular: No carotid bruit.   Cardiovascular:      Heart sounds: Normal heart sounds, S1 normal and S2 normal. No murmur heard.     No friction rub.   Pulmonary:      Effort: Pulmonary effort is normal.      Breath sounds: Normal breath sounds and air entry.   Abdominal:      Palpations: There is no hepatomegaly, splenomegaly or mass.   Musculoskeletal:         General: No swelling or deformity. Normal range of motion.      Cervical back: Neck supple.      Right lower leg: No edema.      Left lower leg: No edema.   Lymphadenopathy:      Cervical: No cervical adenopathy.      Upper Body:      Right upper body: No axillary adenopathy.      Left upper body: No axillary adenopathy.      Lower Body: No right inguinal adenopathy. No left inguinal adenopathy.   Neurological:      Mental Status: He is oriented to person, place,  and time.      Cranial Nerves: Cranial nerves 2-12 are intact. No cranial nerve deficit.      Sensory: No sensory deficit.      Motor: Motor function is intact. No weakness.      Gait: Gait is intact.      Deep Tendon Reflexes: Reflexes normal.   Psychiatric:         Mood and Affect: Mood normal. Mood is not anxious or depressed. Affect is not angry.         Behavior: Behavior is not agitated.         Thought Content: Thought content normal.         Judgment: Judgment normal.     LAB WORK:  Laboratory studies were reviewed.    Assessment/Plan  Problem List Items Addressed This Visit             ICD-10-CM       Cardiac and Vasculature    Hypertension (Chronic) I10       Genitourinary and Reproductive    Benign prostatic hyperplasia N40.0       Neuro    Dementia F03.90     Other Visit Diagnoses         Codes    Agitation    -  Primary R45.1    Violent behavior     R45.6    Dissection of aorta, unspecified portion of aorta (Multi)     I71.00    Closed fracture of multiple ribs with routine healing, unspecified laterality, subsequent encounter     S22.49XD    H/O clavicle fracture     Z87.81    Laceration of lower extremity, unspecified laterality, subsequent encounter     S81.819D    Hyperlipidemia, unspecified hyperlipidemia type     E78.5    Osteoarthritis, unspecified osteoarthritis type, unspecified site     M19.90    At risk for falling     Z91.81        1. Recent agitation with violent behavior, supportive care.  2. Aortic dissection.  Follow-up with Vascular Surgery.  3. Rib fracture, on pain control.  4. Clavicle fracture, on pain control.  5. Leg laceration.  Continue wound care.  6. Dementia, unchanged.  7. BPH, on tamsulosin.  8. Hypertension, med controlled.  9. Hyperlipidemia, on statin.  10. Osteoarthritis, on Tylenol.  11. Fall risk, on fall precautions.    Scribe Attestation  By signing my name below, Reny FREEMAN Scribe attest that this documentation has been prepared under the direction and in the  presence of Yvonne Goldman MD.     All medical record entries made by the scribe were personally dictated by me I have reviewed the chart and agree the record accurately reflects my personal performance of his history physical examination and management      Electronically Signed By: Yvonne Goldman MD   1/19/25 12:16 AM

## 2025-01-13 VITALS
RESPIRATION RATE: 16 BRPM | DIASTOLIC BLOOD PRESSURE: 76 MMHG | HEART RATE: 76 BPM | SYSTOLIC BLOOD PRESSURE: 124 MMHG | TEMPERATURE: 97.9 F

## 2025-01-13 ASSESSMENT — ENCOUNTER SYMPTOMS
FEVER: 0
CHILLS: 0

## 2025-01-13 NOTE — PROGRESS NOTES
PLACE OF SERVICE:  [facility]    This is new/initial history and physical.    Subjective   Patient ID: Elian Betancourt is a 79 y.o. male who presents for Annual Exam and new/initial history.    Mr. Elian Betancourt is an 79-year-old male with a history of dementia with recent fall and leg laceration.  He was found to have an aortic dissection.  He has undergone Vascular Surgery consult.  He requires supportive care.    Review of Systems   Constitutional:  Negative for chills and fever.   Cardiovascular:  Negative for chest pain.   All other systems reviewed and are negative.    Objective   /76   Pulse 76   Temp 36.6 °C (97.9 °F)   Resp 16     Physical Exam  Vitals reviewed.   Constitutional:       Comments: This is a well-developed and well-nourished male, lying in bed, appearing weak.   HENT:      Right Ear: Tympanic membrane, ear canal and external ear normal.      Left Ear: Tympanic membrane, ear canal and external ear normal.   Eyes:      General: No scleral icterus.     Pupils: Pupils are equal, round, and reactive to light.   Neck:      Vascular: No carotid bruit.   Cardiovascular:      Heart sounds: Normal heart sounds, S1 normal and S2 normal. No murmur heard.     No friction rub.   Pulmonary:      Effort: Pulmonary effort is normal.      Breath sounds: Normal breath sounds and air entry.   Abdominal:      Palpations: There is no hepatomegaly, splenomegaly or mass.   Musculoskeletal:         General: No swelling or deformity. Normal range of motion.      Cervical back: Neck supple.      Right lower leg: No edema.      Left lower leg: No edema.      Comments: The patient's leg showed wound care employed.   Lymphadenopathy:      Cervical: No cervical adenopathy.      Upper Body:      Right upper body: No axillary adenopathy.      Left upper body: No axillary adenopathy.      Lower Body: No right inguinal adenopathy. No left inguinal adenopathy.   Neurological:      Mental Status:  He is lethargic.      Cranial Nerves: Cranial nerves 2-12 are intact. No cranial nerve deficit.      Sensory: No sensory deficit.      Motor: Motor function is intact. No weakness.      Gait: Gait is intact.      Deep Tendon Reflexes: Reflexes normal.      Comments: The patient is oriented x2.   Psychiatric:         Mood and Affect: Mood normal. Mood is not anxious or depressed. Affect is not angry.         Behavior: Behavior is not agitated.         Thought Content: Thought content normal.         Judgment: Judgment normal.     LAB WORK: Laboratory studies were reviewed.    Assessment/Plan   Problem List Items Addressed This Visit             ICD-10-CM       Cardiac and Vasculature    Hypertension (Chronic) I10       Genitourinary and Reproductive    Benign prostatic hyperplasia N40.0       Neuro    Dementia F03.90     Other Visit Diagnoses         Codes    Dissection of aorta, unspecified portion of aorta (Multi)    -  Primary I71.00    Laceration of lower extremity, unspecified laterality, subsequent encounter     S81.819D    Closed fracture of multiple ribs with routine healing, unspecified laterality, subsequent encounter     S22.49XD    H/O clavicle fracture     Z87.81    Hyperlipidemia, unspecified hyperlipidemia type     E78.5    Osteoarthritis, unspecified osteoarthritis type, unspecified site     M19.90    Weakness     R53.1    At risk for falling     Z91.81        1. Aortic dissection.  Follow-up with Vascular Surgery.  2. Dementia, unchanged.  3. Leg laceration.  Continue wound care.  4. Rib fractures, on pain control.  5. Clavicle fracture, on pain control.  6. Hypertension, med controlled.  7. Hyperlipidemia, on statin.  8. BPH, on tamsulosin.  9. Osteoarthritis, on Tylenol.  10. Weakness, on PT/OT.  11. Fall risk, on fall precautions.    Scribe Attestation  By signing my name below, IReny Scribe attest that this documentation has been prepared under the direction and in the presence of  Yvonne Goldman MD.     All medical record entries made by the scribe were personally dictated by me I have reviewed the chart and agree the record accurately reflects my personal performance of his history physical examination and management

## 2025-01-14 VITALS
DIASTOLIC BLOOD PRESSURE: 80 MMHG | RESPIRATION RATE: 16 BRPM | TEMPERATURE: 97.9 F | HEART RATE: 82 BPM | SYSTOLIC BLOOD PRESSURE: 126 MMHG

## 2025-01-14 ASSESSMENT — ENCOUNTER SYMPTOMS
CHILLS: 0
FEVER: 0

## 2025-01-14 NOTE — PROGRESS NOTES
PLACE OF SERVICE:  [facility]    This is a subsequent visit.    Subjective   Patient ID: Elian Betancourt is a 79 y.o. male who presents for Follow-up.    Mr. Elian Betancourt is an 79-year-old male with a history of dementia with aortic dissection.  He has had a recent episode of agitation with violent behavior.  He requires supportive care.    Review of Systems   Constitutional:  Negative for chills and fever.   Cardiovascular:  Negative for chest pain.   All other systems reviewed and are negative.    Objective   /80   Pulse 82   Temp 36.6 °C (97.9 °F)   Resp 16     Physical Exam  Vitals reviewed.   Constitutional:       General: He is not in acute distress.     Comments: This is a well-developed and well-nourished male, sitting in a chair   HENT:      Right Ear: Tympanic membrane, ear canal and external ear normal.      Left Ear: Tympanic membrane, ear canal and external ear normal.   Eyes:      General: No scleral icterus.     Pupils: Pupils are equal, round, and reactive to light.   Neck:      Vascular: No carotid bruit.   Cardiovascular:      Heart sounds: Normal heart sounds, S1 normal and S2 normal. No murmur heard.     No friction rub.   Pulmonary:      Effort: Pulmonary effort is normal.      Breath sounds: Normal breath sounds and air entry.   Abdominal:      Palpations: There is no hepatomegaly, splenomegaly or mass.   Musculoskeletal:         General: No swelling or deformity. Normal range of motion.      Cervical back: Neck supple.      Right lower leg: No edema.      Left lower leg: No edema.   Lymphadenopathy:      Cervical: No cervical adenopathy.      Upper Body:      Right upper body: No axillary adenopathy.      Left upper body: No axillary adenopathy.      Lower Body: No right inguinal adenopathy. No left inguinal adenopathy.   Neurological:      Mental Status: He is oriented to person, place, and time.      Cranial Nerves: Cranial nerves 2-12 are intact. No  cranial nerve deficit.      Sensory: No sensory deficit.      Motor: Motor function is intact. No weakness.      Gait: Gait is intact.      Deep Tendon Reflexes: Reflexes normal.   Psychiatric:         Mood and Affect: Mood normal. Mood is not anxious or depressed. Affect is not angry.         Behavior: Behavior is not agitated.         Thought Content: Thought content normal.         Judgment: Judgment normal.     LAB WORK:  Laboratory studies were reviewed.    Assessment/Plan   Problem List Items Addressed This Visit             ICD-10-CM       Cardiac and Vasculature    Hypertension (Chronic) I10       Genitourinary and Reproductive    Benign prostatic hyperplasia N40.0       Neuro    Dementia F03.90     Other Visit Diagnoses         Codes    Agitation    -  Primary R45.1    Violent behavior     R45.6    Dissection of aorta, unspecified portion of aorta (Multi)     I71.00    Closed fracture of multiple ribs with routine healing, unspecified laterality, subsequent encounter     S22.49XD    H/O clavicle fracture     Z87.81    Laceration of lower extremity, unspecified laterality, subsequent encounter     S81.819D    Hyperlipidemia, unspecified hyperlipidemia type     E78.5    Osteoarthritis, unspecified osteoarthritis type, unspecified site     M19.90    At risk for falling     Z91.81        1. Recent agitation with violent behavior, supportive care.  2. Aortic dissection.  Follow-up with Vascular Surgery.  3. Rib fracture, on pain control.  4. Clavicle fracture, on pain control.  5. Leg laceration.  Continue wound care.  6. Dementia, unchanged.  7. BPH, on tamsulosin.  8. Hypertension, med controlled.  9. Hyperlipidemia, on statin.  10. Osteoarthritis, on Tylenol.  11. Fall risk, on fall precautions.    Scribe Attestation  By signing my name below, IReny Scribe attest that this documentation has been prepared under the direction and in the presence of Yvonne Goldman MD.     All medical record entries  made by the scribe were personally dictated by me I have reviewed the chart and agree the record accurately reflects my personal performance of his history physical examination and management

## 2025-01-16 PROBLEM — I48.91 ATRIAL FIBRILLATION, UNSPECIFIED TYPE (MULTI): Status: ACTIVE | Noted: 2025-01-16

## 2025-01-18 ENCOUNTER — NURSING HOME VISIT (OUTPATIENT)
Dept: POST ACUTE CARE | Facility: EXTERNAL LOCATION | Age: 80
End: 2025-01-18
Payer: MEDICARE

## 2025-01-18 DIAGNOSIS — S81.819D LACERATION OF LOWER EXTREMITY, UNSPECIFIED LATERALITY, SUBSEQUENT ENCOUNTER: ICD-10-CM

## 2025-01-18 DIAGNOSIS — M19.90 OSTEOARTHRITIS, UNSPECIFIED OSTEOARTHRITIS TYPE, UNSPECIFIED SITE: ICD-10-CM

## 2025-01-18 DIAGNOSIS — Z91.81 AT RISK FOR FALLING: ICD-10-CM

## 2025-01-18 DIAGNOSIS — E78.5 HYPERLIPIDEMIA, UNSPECIFIED HYPERLIPIDEMIA TYPE: ICD-10-CM

## 2025-01-18 DIAGNOSIS — R53.1 WEAKNESS: ICD-10-CM

## 2025-01-18 DIAGNOSIS — Z87.81 H/O CLAVICLE FRACTURE: ICD-10-CM

## 2025-01-18 DIAGNOSIS — S22.49XD CLOSED FRACTURE OF MULTIPLE RIBS WITH ROUTINE HEALING, UNSPECIFIED LATERALITY, SUBSEQUENT ENCOUNTER: ICD-10-CM

## 2025-01-18 DIAGNOSIS — I71.00 DISSECTION OF AORTA, UNSPECIFIED PORTION OF AORTA (MULTI): ICD-10-CM

## 2025-01-18 DIAGNOSIS — F03.90 DEMENTIA, UNSPECIFIED DEMENTIA SEVERITY, UNSPECIFIED DEMENTIA TYPE, UNSPECIFIED WHETHER BEHAVIORAL, PSYCHOTIC, OR MOOD DISTURBANCE OR ANXIETY (MULTI): Primary | ICD-10-CM

## 2025-01-18 DIAGNOSIS — R45.6 VIOLENT BEHAVIOR: ICD-10-CM

## 2025-01-18 DIAGNOSIS — N40.0 BENIGN PROSTATIC HYPERPLASIA, UNSPECIFIED WHETHER LOWER URINARY TRACT SYMPTOMS PRESENT: ICD-10-CM

## 2025-01-18 DIAGNOSIS — I10 HYPERTENSION, UNSPECIFIED TYPE: ICD-10-CM

## 2025-01-18 PROCEDURE — 99309 SBSQ NF CARE MODERATE MDM 30: CPT | Performed by: INTERNAL MEDICINE

## 2025-01-18 NOTE — LETTER
Patient: Elian Betancourt  : 1945    Encounter Date: 2025    PLACE OF SERVICE:  [facility].    This is a subsequent visit.    Subjective  Patient ID: Elian Betancourt is a 79 y.o. male who presents for Follow-up.    Mr. Elian Betancourt is an 79-year-old male with history of dementia with recent aortic dissection.  He is followed by Vascular Surgery.  He requires supportive care.    Review of Systems   Constitutional:  Negative for chills and fever.   Cardiovascular:  Negative for chest pain.   All other systems reviewed and are negative.    Objective  /78   Pulse 82   Temp 36.6 °C (97.9 °F)   Resp 16     Physical Exam  Vitals reviewed.   Constitutional:       General: He is not in acute distress.     Comments: This is a well-developed and well-nourished male, sitting in a chair.   HENT:      Right Ear: Tympanic membrane, ear canal and external ear normal.      Left Ear: Tympanic membrane, ear canal and external ear normal.   Eyes:      General: No scleral icterus.     Pupils: Pupils are equal, round, and reactive to light.   Neck:      Vascular: No carotid bruit.   Cardiovascular:      Heart sounds: Normal heart sounds, S1 normal and S2 normal. No murmur heard.     No friction rub.   Pulmonary:      Effort: Pulmonary effort is normal.      Breath sounds: Normal breath sounds and air entry.   Abdominal:      Palpations: There is no hepatomegaly, splenomegaly or mass.   Musculoskeletal:         General: No swelling or deformity. Normal range of motion.      Cervical back: Neck supple.      Right lower leg: No edema.      Left lower leg: No edema.   Lymphadenopathy:      Cervical: No cervical adenopathy.      Upper Body:      Right upper body: No axillary adenopathy.      Left upper body: No axillary adenopathy.      Lower Body: No right inguinal adenopathy. No left inguinal adenopathy.   Skin:     Comments: The patient is alert and oriented x2.   Neurological:      Mental  Status: He is oriented to person, place, and time.      Cranial Nerves: Cranial nerves 2-12 are intact. No cranial nerve deficit.      Sensory: No sensory deficit.      Motor: Motor function is intact. No weakness.      Gait: Gait is intact.      Deep Tendon Reflexes: Reflexes normal.   Psychiatric:         Mood and Affect: Mood normal. Mood is not anxious or depressed. Affect is not angry.         Behavior: Behavior is not agitated.         Thought Content: Thought content normal.         Judgment: Judgment normal.     LAB WORK:  Laboratory studies were reviewed.    Assessment/Plan  Problem List Items Addressed This Visit             ICD-10-CM       Cardiac and Vasculature    Hypertension (Chronic) I10       Genitourinary and Reproductive    Benign prostatic hyperplasia N40.0       Neuro    Dementia - Primary F03.90     Other Visit Diagnoses         Codes    Dissection of aorta, unspecified portion of aorta (Multi)     I71.00    Closed fracture of multiple ribs with routine healing, unspecified laterality, subsequent encounter     S22.49XD    H/O clavicle fracture     Z87.81    Laceration of lower extremity, unspecified laterality, subsequent encounter     S81.819D    Violent behavior     R45.6    Hyperlipidemia, unspecified hyperlipidemia type     E78.5    Osteoarthritis, unspecified osteoarthritis type, unspecified site     M19.90    Weakness     R53.1    At risk for falling     Z91.81        1. Dementia, unchanged.  2. Aortic dissection.  Follow-up with Vascular Surgery.  3. Rib fracture, on pain control.  4. Clavicle fracture, on pain control.  5. Leg laceration.  Continue wound care.  6. Recent violent behavior, on supportive care.  7. Hyperlipidemia, on statin.  8. Hypertension, medically controlled.  9. BPH, on tamulosin.  10. Osteoarthritis, on Tylenol.  11. Weakness, on PT/OT.  12. Fall risk, on fall precautions.    Scribe Attestation  By signing my name below, ICheryl, Scribe attest that this  documentation has been prepared under the direction and in the presence of Yvonne Goldman MD.     All medical record entries made by the scribe were personally dictated by me I have reviewed the chart and agree the record accurately reflects my personal performance of his history physical examination and management      Electronically Signed By: Yvonne Goldman MD   1/20/25  9:09 PM

## 2025-01-20 VITALS
HEART RATE: 82 BPM | SYSTOLIC BLOOD PRESSURE: 126 MMHG | RESPIRATION RATE: 16 BRPM | TEMPERATURE: 97.9 F | DIASTOLIC BLOOD PRESSURE: 78 MMHG

## 2025-01-20 ASSESSMENT — ENCOUNTER SYMPTOMS
CHILLS: 0
FEVER: 0
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
## 2025-01-22 ENCOUNTER — TELEPHONE (OUTPATIENT)
Dept: PRIMARY CARE | Facility: CLINIC | Age: 80
End: 2025-01-22
Payer: MEDICARE

## 2025-01-22 NOTE — TELEPHONE ENCOUNTER
LV 10/17/24, NV 4/17/25    Pt was discharged from [facility] yesterday.  Pt is agitated, anxious,  and driving his wife nuts.  Please advice    [phone]    [facility] [address]

## 2025-01-27 ENCOUNTER — TELEPHONE (OUTPATIENT)
Dept: PRIMARY CARE | Facility: CLINIC | Age: 80
End: 2025-01-27
Payer: MEDICARE

## 2025-01-27 NOTE — TELEPHONE ENCOUNTER
Notified home care that Dr Mascorro would follow and sign home care orders.  Where would you like me to put on the schedule?

## 2025-01-27 NOTE — TELEPHONE ENCOUNTER
Pt DC'd from [facility] on 1/12/25 and home care asking if you will sign and follow orders for home care.  Please advise.  Ph: [phone] option 1    LV 10/17/24, NV 4/17/25

## 2025-01-29 ENCOUNTER — APPOINTMENT (OUTPATIENT)
Dept: RADIOLOGY | Facility: HOSPITAL | Age: 80
End: 2025-01-29
Payer: MEDICARE

## 2025-01-29 ENCOUNTER — APPOINTMENT (OUTPATIENT)
Dept: VASCULAR SURGERY | Facility: CLINIC | Age: 80
End: 2025-01-29
Payer: MEDICARE

## 2025-01-31 ENCOUNTER — HOSPITAL ENCOUNTER (INPATIENT)
Facility: HOSPITAL | Age: 80
End: 2025-01-31
Attending: EMERGENCY MEDICINE | Admitting: INTERNAL MEDICINE
Payer: MEDICARE

## 2025-01-31 ENCOUNTER — APPOINTMENT (OUTPATIENT)
Dept: RADIOLOGY | Facility: HOSPITAL | Age: 80
End: 2025-01-31
Payer: MEDICARE

## 2025-01-31 DIAGNOSIS — F03.90 DEMENTIA, UNSPECIFIED DEMENTIA SEVERITY, UNSPECIFIED DEMENTIA TYPE, UNSPECIFIED WHETHER BEHAVIORAL, PSYCHOTIC, OR MOOD DISTURBANCE OR ANXIETY (MULTI): ICD-10-CM

## 2025-01-31 DIAGNOSIS — K59.09 OTHER CONSTIPATION: ICD-10-CM

## 2025-01-31 DIAGNOSIS — R11.2 NAUSEA AND VOMITING, UNSPECIFIED VOMITING TYPE: ICD-10-CM

## 2025-01-31 DIAGNOSIS — Z87.39 H/O DEGENERATIVE DISC DISEASE: ICD-10-CM

## 2025-01-31 DIAGNOSIS — E86.0 DEHYDRATION: ICD-10-CM

## 2025-01-31 DIAGNOSIS — U07.1 COVID-19 VIRUS INFECTION: ICD-10-CM

## 2025-01-31 DIAGNOSIS — I48.91 ATRIAL FIBRILLATION, UNSPECIFIED TYPE (MULTI): ICD-10-CM

## 2025-01-31 DIAGNOSIS — R41.82 ALTERED MENTAL STATUS, UNSPECIFIED ALTERED MENTAL STATUS TYPE: Primary | ICD-10-CM

## 2025-01-31 LAB
ALBUMIN SERPL BCP-MCNC: 3 G/DL (ref 3.4–5)
ALP SERPL-CCNC: 69 U/L (ref 33–136)
ALT SERPL W P-5'-P-CCNC: 26 U/L (ref 10–52)
ANION GAP SERPL CALCULATED.3IONS-SCNC: 11 MMOL/L (ref 10–20)
APPEARANCE UR: CLEAR
AST SERPL W P-5'-P-CCNC: 48 U/L (ref 9–39)
BASOPHILS # BLD AUTO: 0 X10*3/UL (ref 0–0.1)
BASOPHILS NFR BLD AUTO: 0 %
BILIRUB SERPL-MCNC: 0.7 MG/DL (ref 0–1.2)
BILIRUB UR STRIP.AUTO-MCNC: NEGATIVE MG/DL
BUN SERPL-MCNC: 31 MG/DL (ref 6–23)
CALCIUM SERPL-MCNC: 8.3 MG/DL (ref 8.6–10.3)
CHLORIDE SERPL-SCNC: 108 MMOL/L (ref 98–107)
CO2 SERPL-SCNC: 26 MMOL/L (ref 21–32)
COLOR UR: YELLOW
CREAT SERPL-MCNC: 0.75 MG/DL (ref 0.5–1.3)
EGFRCR SERPLBLD CKD-EPI 2021: >90 ML/MIN/1.73M*2
EOSINOPHIL # BLD AUTO: 0 X10*3/UL (ref 0–0.4)
EOSINOPHIL NFR BLD AUTO: 0 %
ERYTHROCYTE [DISTWIDTH] IN BLOOD BY AUTOMATED COUNT: 13.7 % (ref 11.5–14.5)
FLUAV RNA RESP QL NAA+PROBE: NOT DETECTED
FLUBV RNA RESP QL NAA+PROBE: NOT DETECTED
GLUCOSE SERPL-MCNC: 95 MG/DL (ref 74–99)
GLUCOSE UR STRIP.AUTO-MCNC: NORMAL MG/DL
HCT VFR BLD AUTO: 31.8 % (ref 41–52)
HGB BLD-MCNC: 10 G/DL (ref 13.5–17.5)
IMM GRANULOCYTES # BLD AUTO: 0.02 X10*3/UL (ref 0–0.5)
IMM GRANULOCYTES NFR BLD AUTO: 0.4 % (ref 0–0.9)
KETONES UR STRIP.AUTO-MCNC: ABNORMAL MG/DL
LEUKOCYTE ESTERASE UR QL STRIP.AUTO: NEGATIVE
LYMPHOCYTES # BLD AUTO: 0.42 X10*3/UL (ref 0.8–3)
LYMPHOCYTES NFR BLD AUTO: 8.5 %
MAGNESIUM SERPL-MCNC: 1.91 MG/DL (ref 1.6–2.4)
MCH RBC QN AUTO: 28.6 PG (ref 26–34)
MCHC RBC AUTO-ENTMCNC: 31.4 G/DL (ref 32–36)
MCV RBC AUTO: 91 FL (ref 80–100)
MONOCYTES # BLD AUTO: 0.44 X10*3/UL (ref 0.05–0.8)
MONOCYTES NFR BLD AUTO: 8.9 %
MUCOUS THREADS #/AREA URNS AUTO: ABNORMAL /LPF
NEUTROPHILS # BLD AUTO: 4.09 X10*3/UL (ref 1.6–5.5)
NEUTROPHILS NFR BLD AUTO: 82.2 %
NITRITE UR QL STRIP.AUTO: NEGATIVE
NRBC BLD-RTO: 0 /100 WBCS (ref 0–0)
PH UR STRIP.AUTO: 5.5 [PH]
PLATELET # BLD AUTO: 157 X10*3/UL (ref 150–450)
POTASSIUM SERPL-SCNC: 4 MMOL/L (ref 3.5–5.3)
PROT SERPL-MCNC: 6 G/DL (ref 6.4–8.2)
PROT UR STRIP.AUTO-MCNC: ABNORMAL MG/DL
RBC # BLD AUTO: 3.5 X10*6/UL (ref 4.5–5.9)
RBC # UR STRIP.AUTO: ABNORMAL /UL
RBC #/AREA URNS AUTO: >20 /HPF
RSV RNA RESP QL NAA+PROBE: NOT DETECTED
SARS-COV-2 RNA RESP QL NAA+PROBE: DETECTED
SODIUM SERPL-SCNC: 141 MMOL/L (ref 136–145)
SP GR UR STRIP.AUTO: 1.03
SQUAMOUS #/AREA URNS AUTO: ABNORMAL /HPF
UROBILINOGEN UR STRIP.AUTO-MCNC: NORMAL MG/DL
WBC # BLD AUTO: 5 X10*3/UL (ref 4.4–11.3)
WBC #/AREA URNS AUTO: ABNORMAL /HPF

## 2025-01-31 PROCEDURE — 96361 HYDRATE IV INFUSION ADD-ON: CPT

## 2025-01-31 PROCEDURE — 83735 ASSAY OF MAGNESIUM: CPT | Performed by: EMERGENCY MEDICINE

## 2025-01-31 PROCEDURE — 1100000001 HC PRIVATE ROOM DAILY

## 2025-01-31 PROCEDURE — 36415 COLL VENOUS BLD VENIPUNCTURE: CPT | Performed by: EMERGENCY MEDICINE

## 2025-01-31 PROCEDURE — 99285 EMERGENCY DEPT VISIT HI MDM: CPT | Performed by: EMERGENCY MEDICINE

## 2025-01-31 PROCEDURE — 71045 X-RAY EXAM CHEST 1 VIEW: CPT

## 2025-01-31 PROCEDURE — 2500000004 HC RX 250 GENERAL PHARMACY W/ HCPCS (ALT 636 FOR OP/ED): Performed by: INTERNAL MEDICINE

## 2025-01-31 PROCEDURE — 80053 COMPREHEN METABOLIC PANEL: CPT | Performed by: EMERGENCY MEDICINE

## 2025-01-31 PROCEDURE — 96360 HYDRATION IV INFUSION INIT: CPT

## 2025-01-31 PROCEDURE — 2500000004 HC RX 250 GENERAL PHARMACY W/ HCPCS (ALT 636 FOR OP/ED): Performed by: FAMILY MEDICINE

## 2025-01-31 PROCEDURE — 2500000004 HC RX 250 GENERAL PHARMACY W/ HCPCS (ALT 636 FOR OP/ED): Performed by: EMERGENCY MEDICINE

## 2025-01-31 PROCEDURE — 85025 COMPLETE CBC W/AUTO DIFF WBC: CPT | Performed by: EMERGENCY MEDICINE

## 2025-01-31 PROCEDURE — 87637 SARSCOV2&INF A&B&RSV AMP PRB: CPT | Performed by: EMERGENCY MEDICINE

## 2025-01-31 PROCEDURE — 71045 X-RAY EXAM CHEST 1 VIEW: CPT | Mod: FOREIGN READ | Performed by: RADIOLOGY

## 2025-01-31 PROCEDURE — 81001 URINALYSIS AUTO W/SCOPE: CPT | Performed by: EMERGENCY MEDICINE

## 2025-01-31 PROCEDURE — 99222 1ST HOSP IP/OBS MODERATE 55: CPT | Performed by: INTERNAL MEDICINE

## 2025-01-31 RX ORDER — PANTOPRAZOLE SODIUM 40 MG/10ML
40 INJECTION, POWDER, LYOPHILIZED, FOR SOLUTION INTRAVENOUS
Status: DISPENSED | OUTPATIENT
Start: 2025-02-01

## 2025-01-31 RX ORDER — TALC
3 POWDER (GRAM) TOPICAL NIGHTLY PRN
Status: ACTIVE | OUTPATIENT
Start: 2025-01-31

## 2025-01-31 RX ORDER — ACETAMINOPHEN 650 MG/1
650 SUPPOSITORY RECTAL EVERY 4 HOURS PRN
Status: ACTIVE | OUTPATIENT
Start: 2025-01-31

## 2025-01-31 RX ORDER — GUAIFENESIN/DEXTROMETHORPHAN 100-10MG/5
5 SYRUP ORAL EVERY 4 HOURS PRN
Status: DISPENSED | OUTPATIENT
Start: 2025-01-31

## 2025-01-31 RX ORDER — GUAIFENESIN 600 MG/1
600 TABLET, EXTENDED RELEASE ORAL EVERY 12 HOURS PRN
Status: ACTIVE | OUTPATIENT
Start: 2025-01-31

## 2025-01-31 RX ORDER — POLYETHYLENE GLYCOL 3350 17 G/17G
17 POWDER, FOR SOLUTION ORAL DAILY
Status: DISCONTINUED | OUTPATIENT
Start: 2025-01-31 | End: 2025-01-31

## 2025-01-31 RX ORDER — POLYETHYLENE GLYCOL 3350 17 G/17G
17 POWDER, FOR SOLUTION ORAL DAILY
Status: DISPENSED | OUTPATIENT
Start: 2025-01-31

## 2025-01-31 RX ORDER — DOCUSATE SODIUM 100 MG/1
100 CAPSULE, LIQUID FILLED ORAL 2 TIMES DAILY
Status: DISPENSED | OUTPATIENT
Start: 2025-01-31

## 2025-01-31 RX ORDER — ACETAMINOPHEN 160 MG/5ML
650 SOLUTION ORAL EVERY 4 HOURS PRN
Status: ACTIVE | OUTPATIENT
Start: 2025-01-31

## 2025-01-31 RX ORDER — PANTOPRAZOLE SODIUM 40 MG/1
40 TABLET, DELAYED RELEASE ORAL
Status: ACTIVE | OUTPATIENT
Start: 2025-02-01

## 2025-01-31 RX ORDER — ONDANSETRON 4 MG/1
4 TABLET, ORALLY DISINTEGRATING ORAL EVERY 8 HOURS PRN
Status: ACTIVE | OUTPATIENT
Start: 2025-01-31

## 2025-01-31 RX ORDER — LISINOPRIL 10 MG/1
10 TABLET ORAL DAILY
Status: DISPENSED | OUTPATIENT
Start: 2025-01-31

## 2025-01-31 RX ORDER — DEXTROSE MONOHYDRATE, SODIUM CHLORIDE, AND POTASSIUM CHLORIDE 50; 1.49; 4.5 G/1000ML; G/1000ML; G/1000ML
100 INJECTION, SOLUTION INTRAVENOUS CONTINUOUS
Status: DISPENSED | OUTPATIENT
Start: 2025-01-31

## 2025-01-31 RX ORDER — ACETAMINOPHEN 325 MG/1
650 TABLET ORAL EVERY 4 HOURS PRN
Status: ACTIVE | OUTPATIENT
Start: 2025-01-31

## 2025-01-31 RX ORDER — ENOXAPARIN SODIUM 100 MG/ML
40 INJECTION SUBCUTANEOUS DAILY
Status: DISPENSED | OUTPATIENT
Start: 2025-01-31

## 2025-01-31 RX ORDER — ENOXAPARIN SODIUM 100 MG/ML
40 INJECTION SUBCUTANEOUS DAILY
Status: DISCONTINUED | OUTPATIENT
Start: 2025-01-31 | End: 2025-01-31

## 2025-01-31 RX ORDER — ONDANSETRON HYDROCHLORIDE 2 MG/ML
4 INJECTION, SOLUTION INTRAVENOUS EVERY 8 HOURS PRN
Status: ACTIVE | OUTPATIENT
Start: 2025-01-31

## 2025-01-31 RX ORDER — LISINOPRIL 10 MG/1
10 TABLET ORAL DAILY
Status: DISCONTINUED | OUTPATIENT
Start: 2025-01-31 | End: 2025-01-31

## 2025-01-31 RX ADMIN — ENOXAPARIN SODIUM 40 MG: 40 INJECTION SUBCUTANEOUS at 20:58

## 2025-01-31 RX ADMIN — SODIUM CHLORIDE 1000 ML: 900 INJECTION, SOLUTION INTRAVENOUS at 15:00

## 2025-01-31 RX ADMIN — DEXTROSE MONOHYDRATE, SODIUM CHLORIDE, AND POTASSIUM CHLORIDE 100 ML/HR: 50; 4.5; 1.49 INJECTION, SOLUTION INTRAVENOUS at 20:57

## 2025-01-31 SDOH — HEALTH STABILITY: MENTAL HEALTH: HOW OFTEN DO YOU HAVE SIX OR MORE DRINKS ON ONE OCCASION?: PATIENT UNABLE TO ANSWER

## 2025-01-31 SDOH — SOCIAL STABILITY: SOCIAL INSECURITY
WITHIN THE LAST YEAR, HAVE YOU BEEN RAPED OR FORCED TO HAVE ANY KIND OF SEXUAL ACTIVITY BY YOUR PARTNER OR EX-PARTNER?: PATIENT UNABLE TO ANSWER

## 2025-01-31 SDOH — SOCIAL STABILITY: SOCIAL INSECURITY: WITHIN THE LAST YEAR, HAVE YOU BEEN AFRAID OF YOUR PARTNER OR EX-PARTNER?: PATIENT UNABLE TO ANSWER

## 2025-01-31 SDOH — ECONOMIC STABILITY: HOUSING INSECURITY
IN THE LAST 12 MONTHS, WAS THERE A TIME WHEN YOU WERE NOT ABLE TO PAY THE MORTGAGE OR RENT ON TIME?: PATIENT UNABLE TO ANSWER

## 2025-01-31 SDOH — SOCIAL STABILITY: SOCIAL INSECURITY
WITHIN THE LAST YEAR, HAVE YOU BEEN HUMILIATED OR EMOTIONALLY ABUSED IN OTHER WAYS BY YOUR PARTNER OR EX-PARTNER?: PATIENT UNABLE TO ANSWER

## 2025-01-31 SDOH — SOCIAL STABILITY: SOCIAL INSECURITY: HAVE YOU HAD ANY THOUGHTS OF HARMING ANYONE ELSE?: UNABLE TO ASSESS

## 2025-01-31 SDOH — ECONOMIC STABILITY: FOOD INSECURITY
HOW HARD IS IT FOR YOU TO PAY FOR THE VERY BASICS LIKE FOOD, HOUSING, MEDICAL CARE, AND HEATING?: PATIENT UNABLE TO ANSWER

## 2025-01-31 SDOH — HEALTH STABILITY: MENTAL HEALTH: HOW MANY DRINKS CONTAINING ALCOHOL DO YOU HAVE ON A TYPICAL DAY WHEN YOU ARE DRINKING?: PATIENT UNABLE TO ANSWER

## 2025-01-31 SDOH — SOCIAL STABILITY: SOCIAL INSECURITY
WITHIN THE LAST YEAR, HAVE YOU BEEN KICKED, HIT, SLAPPED, OR OTHERWISE PHYSICALLY HURT BY YOUR PARTNER OR EX-PARTNER?: PATIENT UNABLE TO ANSWER

## 2025-01-31 SDOH — ECONOMIC STABILITY: FOOD INSECURITY
WITHIN THE PAST 12 MONTHS, THE FOOD YOU BOUGHT JUST DIDN'T LAST AND YOU DIDN'T HAVE MONEY TO GET MORE.: PATIENT UNABLE TO ANSWER

## 2025-01-31 SDOH — ECONOMIC STABILITY: HOUSING INSECURITY: AT ANY TIME IN THE PAST 12 MONTHS, WERE YOU HOMELESS OR LIVING IN A SHELTER (INCLUDING NOW)?: PATIENT UNABLE TO ANSWER

## 2025-01-31 SDOH — ECONOMIC STABILITY: INCOME INSECURITY
IN THE PAST 12 MONTHS HAS THE ELECTRIC, GAS, OIL, OR WATER COMPANY THREATENED TO SHUT OFF SERVICES IN YOUR HOME?: PATIENT UNABLE TO ANSWER

## 2025-01-31 SDOH — SOCIAL STABILITY: SOCIAL INSECURITY: ARE THERE ANY APPARENT SIGNS OF INJURIES/BEHAVIORS THAT COULD BE RELATED TO ABUSE/NEGLECT?: UNABLE TO ASSESS

## 2025-01-31 SDOH — SOCIAL STABILITY: SOCIAL INSECURITY: WERE YOU ABLE TO COMPLETE ALL THE BEHAVIORAL HEALTH SCREENINGS?: NO

## 2025-01-31 SDOH — SOCIAL STABILITY: SOCIAL INSECURITY: DO YOU FEEL UNSAFE GOING BACK TO THE PLACE WHERE YOU ARE LIVING?: UNABLE TO ASSESS

## 2025-01-31 SDOH — ECONOMIC STABILITY: HOUSING INSECURITY: IN THE PAST 12 MONTHS, HOW MANY TIMES HAVE YOU MOVED WHERE YOU WERE LIVING?: 0

## 2025-01-31 SDOH — ECONOMIC STABILITY: TRANSPORTATION INSECURITY
IN THE PAST 12 MONTHS, HAS LACK OF TRANSPORTATION KEPT YOU FROM MEDICAL APPOINTMENTS OR FROM GETTING MEDICATIONS?: PATIENT UNABLE TO ANSWER

## 2025-01-31 SDOH — SOCIAL STABILITY: SOCIAL INSECURITY: DO YOU FEEL ANYONE HAS EXPLOITED OR TAKEN ADVANTAGE OF YOU FINANCIALLY OR OF YOUR PERSONAL PROPERTY?: UNABLE TO ASSESS

## 2025-01-31 SDOH — SOCIAL STABILITY: SOCIAL INSECURITY: HAS ANYONE EVER THREATENED TO HURT YOUR FAMILY OR YOUR PETS?: UNABLE TO ASSESS

## 2025-01-31 SDOH — SOCIAL STABILITY: SOCIAL INSECURITY: DOES ANYONE TRY TO KEEP YOU FROM HAVING/CONTACTING OTHER FRIENDS OR DOING THINGS OUTSIDE YOUR HOME?: UNABLE TO ASSESS

## 2025-01-31 SDOH — SOCIAL STABILITY: SOCIAL INSECURITY

## 2025-01-31 SDOH — ECONOMIC STABILITY: FOOD INSECURITY
WITHIN THE PAST 12 MONTHS, YOU WORRIED THAT YOUR FOOD WOULD RUN OUT BEFORE YOU GOT THE MONEY TO BUY MORE.: PATIENT UNABLE TO ANSWER

## 2025-01-31 SDOH — SOCIAL STABILITY: SOCIAL INSECURITY: ARE YOU OR HAVE YOU BEEN THREATENED OR ABUSED PHYSICALLY, EMOTIONALLY, OR SEXUALLY BY ANYONE?: UNABLE TO ASSESS

## 2025-01-31 SDOH — SOCIAL STABILITY: SOCIAL INSECURITY: ABUSE: ADULT

## 2025-01-31 SDOH — HEALTH STABILITY: MENTAL HEALTH: HOW OFTEN DO YOU HAVE A DRINK CONTAINING ALCOHOL?: PATIENT UNABLE TO ANSWER

## 2025-01-31 ASSESSMENT — ACTIVITIES OF DAILY LIVING (ADL)
FEEDING YOURSELF: DEPENDENT
JUDGMENT_ADEQUATE_SAFELY_COMPLETE_DAILY_ACTIVITIES: NO
WALKS IN HOME: DEPENDENT
GROOMING: DEPENDENT
DRESSING YOURSELF: DEPENDENT
PATIENT'S MEMORY ADEQUATE TO SAFELY COMPLETE DAILY ACTIVITIES?: NO
ASSISTIVE_DEVICE: OTHER (COMMENT)
LACK_OF_TRANSPORTATION: PATIENT UNABLE TO ANSWER
TOILETING: DEPENDENT
HEARING - RIGHT EAR: UNABLE TO ASSESS
LACK_OF_TRANSPORTATION: PATIENT UNABLE TO ANSWER
HEARING - LEFT EAR: UNABLE TO ASSESS
BATHING: DEPENDENT
ADEQUATE_TO_COMPLETE_ADL: YES

## 2025-01-31 ASSESSMENT — LIFESTYLE VARIABLES
HOW MANY STANDARD DRINKS CONTAINING ALCOHOL DO YOU HAVE ON A TYPICAL DAY: PATIENT UNABLE TO ANSWER
AUDIT-C TOTAL SCORE: -1
HOW OFTEN DO YOU HAVE 6 OR MORE DRINKS ON ONE OCCASION: PATIENT UNABLE TO ANSWER
SKIP TO QUESTIONS 9-10: 0
AUDIT-C TOTAL SCORE: -1
HOW OFTEN DO YOU HAVE A DRINK CONTAINING ALCOHOL: PATIENT UNABLE TO ANSWER
SKIP TO QUESTIONS 9-10: 0
AUDIT-C TOTAL SCORE: -1

## 2025-01-31 ASSESSMENT — COGNITIVE AND FUNCTIONAL STATUS - GENERAL
WALKING IN HOSPITAL ROOM: TOTAL
PATIENT BASELINE BEDBOUND: YES
MOVING TO AND FROM BED TO CHAIR: A LOT
TURNING FROM BACK TO SIDE WHILE IN FLAT BAD: A LOT
MOBILITY SCORE: 9
DRESSING REGULAR LOWER BODY CLOTHING: A LOT
TOILETING: A LOT
EATING MEALS: A LOT
DAILY ACTIVITIY SCORE: 12
PERSONAL GROOMING: A LOT
HELP NEEDED FOR BATHING: A LOT
MOVING FROM LYING ON BACK TO SITTING ON SIDE OF FLAT BED WITH BEDRAILS: A LOT
CLIMB 3 TO 5 STEPS WITH RAILING: TOTAL
DRESSING REGULAR UPPER BODY CLOTHING: A LOT
STANDING UP FROM CHAIR USING ARMS: TOTAL

## 2025-01-31 ASSESSMENT — PAIN SCALES - GENERAL: PAINLEVEL_OUTOF10: 0 - NO PAIN

## 2025-01-31 ASSESSMENT — PAIN - FUNCTIONAL ASSESSMENT
PAIN_FUNCTIONAL_ASSESSMENT: PAINAD (PAIN ASSESSMENT IN ADVANCED DEMENTIA SCALE)
PAIN_FUNCTIONAL_ASSESSMENT: 0-10

## 2025-01-31 ASSESSMENT — PAIN SCALES - PAIN ASSESSMENT IN ADVANCED DEMENTIA (PAINAD)
BODYLANGUAGE: TENSE, DISTRESSED PACING, FIDGETING
BREATHING: NORMAL
CONSOLABILITY: NO NEED TO CONSOLE
FACIALEXPRESSION: SMILING OR INEXPRESSIVE
TOTALSCORE: 1

## 2025-01-31 ASSESSMENT — PAIN DESCRIPTION - PROGRESSION: CLINICAL_PROGRESSION: NOT CHANGED

## 2025-01-31 NOTE — PROGRESS NOTES
Pharmacy Medication History Review    Elian Betancourt is a 79 y.o. male admitted for altered mental status. Pharmacy reviewed the patient's [id number] medications and allergies for accuracy.    Medications ADDED:  N/A  Medications CHANGED:  N/A  Medications REMOVED:   N/A    The list below reflects the updated PTA list.   Prior to Admission Medications   Prescriptions Last Dose Informant Patient Reported? Taking?   lisinopril 10 mg tablet 1/30/2025 Morning  No Yes   Sig: Take 1 tablet (10 mg) by mouth once daily.      Facility-Administered Medications: None        The list below reflects the updated allergy list. Please review each documented allergy for additional clarification and justification.  Allergies  Reviewed by Cristy Hong on 1/31/2025   No Known Allergies         Patient was unable to be assessed for M2B at discharge.     Sources:   Pharmacy dispense history  Spouse     Additional Comments:  Patient was unable to speak to me but his wife was in the room and was able to confirm he is only taking Lisinopril. Patient hasn't had is dose for the day.      Cristy Hong  Pharmacy Technician  01/31/25     Secure Chat preferred

## 2025-01-31 NOTE — TELEPHONE ENCOUNTER
Wendy states she needs to put pt in the nursing home.  She is unable to take care of him any longer.  Pt has fallen out of bed 3 times in the past two days.  Please advice.    [phone]

## 2025-01-31 NOTE — ED PROVIDER NOTES
EMERGENCY DEPARTMENT ENCOUNTER      Pt Name: Elian Betancourt  MRN: [id number]  Birthdate 1945  Date of evaluation: 1/31/2025  ED Provider: Earl Gallardo DO     CHIEF COMPLAINT       Chief Complaint   Patient presents with    Altered Mental Status     Pt bib ems for c/o AMS. Pt has been more altered than usual and fell three times yesterday. Pt is not on thinners, did not hit head, and no LOC.          HISTORY OF PRESENT ILLNESS   (Location/Symptom, Timing/Onset, Context/Setting, Quality, Duration, Modifying Factors, Severity) Note limiting factors.       HPI    Elian Betancourt is a 79 y.o. who presents to the emergency department for evaluation of worsening alteration mental status and agitation.  Patient has history of dementia for direction of PCP.  Additionally has had nonfocal increasing weakness small falls elastase not strike had is DNR    Nursing Notes were reviewed.    History obtained from :  patient    Outside records reviewed: N/A      History/Collateral information obtained from: N/A     Chronic conditions affecting care: Dementia        Patient's PDMP reviewed personally    ED Course as of 01/31/25 1645   Fri Jan 31, 2025   1429 Patient awake in bed, advancing dementia DNR no intubation [SL]   1430 EKG interpretation  Obtained 1424 reviewed 1428  Sinus rhythm short WI no acute ST elevation depression signs of acute ischemia tremulous QTc 452   rate 69  Per my independent interpretation [SL]   1600 Patient found to be COVID positive [SL]      ED Course User Index  [SL] Earl Gallardo DO         Diagnoses as of 01/31/25 1645   Altered mental status, unspecified altered mental status type   Dehydration   Dementia, unspecified dementia severity, unspecified dementia type, unspecified whether behavioral, psychotic, or mood disturbance or anxiety (Multi)   COVID-19 virus infection        Discussion/ MDM:      All results/imaging obtained reviewed and interpreted, results  trended/compared with previous levels if available to evaluate for abnormality contributing to today´s presentation  After reviewing patient´s comorbidities, severity of history of presenting illness, labs andimaging if obtained in conjunction with physical exam and course in emergency department, deemed to have potential for deterioration/progression of symptoms that could lead to multiple morbidities or mortality, decision made that patient requiresfurther observation/evaluation/treatment and patient admitted to appropriate service,patient/family understand and agree with plan.     Chart created with voice recognition software, errors may be present due to software´sinterpretation        REVIEW OF SYSTEMS     Review of Systems  Pertinent positives and negatives as per HPI    PAST MEDICAL HISTORY     Past Medical History:   Diagnosis Date    Abdominal pain 03/08/2024    Agitation     Allergic rhinitis     Aortic dissection (Multi)     Arthritis     At risk for falling     Back pain     BPH (benign prostatic hyperplasia)     Cellulitis of left lower limb 04/26/2023    Chronic maxillary sinusitis 09/17/2023    Chronic pancreatitis (Multi)     Clavicle fracture     Closed fracture of acromial end of clavicle 04/09/2024    Contusion of face 03/08/2024    DDD (degenerative disc disease), lumbar     DDD (degenerative disc disease), thoracic     Dementia     Difficulty in walking, not elsewhere classified 04/26/2023    Dysfunction of eustachian tube 09/17/2023    Epistaxis 09/17/2023    Facial laceration 03/08/2024    Hiatal hernia     HTN (hypertension)     Hypercholesteremia     Hyperlipidemia     Injury of back 09/17/2023    Leg laceration     Leukopenia     Muscle weakness (generalized) 04/26/2023    Nasal discharge 09/17/2023    Osteoarthritis     Pain of left shoulder region 04/09/2024    Personal history of other specified conditions 06/23/2014    History of shortness of breath    Polyp of nasal cavity 03/08/2024     Repeated falls 04/26/2023    Rib fractures     Scoliosis 04/26/2023    Varicose veins of both legs with edema     Violent behavior     Weakness 04/25/2023       SURGICAL HISTORY       Past Surgical History:   Procedure Laterality Date    MYRINGOTOMY W/ TUBES  10/30/2013    Myringotomy - With Ventilating Tube Insertion    SEPTOPLASTY  09/16/2014    Septoplasty    TONSILLECTOMY  10/30/2013    Tonsillectomy With Adenoidectomy       CURRENT MEDICATIONS       Previous Medications    LISINOPRIL 10 MG TABLET    Take 1 tablet (10 mg) by mouth once daily.       ALLERGIES     Patient has no known allergies.    FAMILY HISTORY       Family History   Problem Relation Name Age of Onset    Hypertension Mother      Dementia Mother      Lung cancer Father      Stroke Other Grandfather         SOCIAL HISTORY       Social History     Socioeconomic History    Marital status:    Tobacco Use    Smoking status: Never     Passive exposure: Never    Smokeless tobacco: Never   Vaping Use    Vaping status: Never Used   Substance and Sexual Activity    Alcohol use: Never     Alcohol/week: 3.0 standard drinks of alcohol     Types: 3 Shots of liquor per week    Drug use: Never     Social Drivers of Health     Financial Resource Strain: Low Risk  (12/31/2024)    Overall Financial Resource Strain (CARDIA)     Difficulty of Paying Living Expenses: Not hard at all   Transportation Needs: No Transportation Needs (12/31/2024)    PRAPARE - Transportation     Lack of Transportation (Medical): No     Lack of Transportation (Non-Medical): No   Housing Stability: Unknown (12/31/2024)    Housing Stability Vital Sign     Unable to Pay for Housing in the Last Year: No     Homeless in the Last Year: No       PHYSICAL EXAM       ED Triage Vitals [01/31/25 1423]   Temperature Heart Rate Respirations BP   37.1 °C (98.7 °F) (!) 123 (!) 24 153/86      Pulse Ox Temp Source Heart Rate Source Patient Position   95 % Temporal -- --      BP Location FiO2 (%)      -- --         Physical Exam  GEN: patient nontoxic appearing, no acute distress in room, well-nourished well-developed  HEENT: pupils equal and round bilaterally no nystagmus no gaze preference, face symmetric no   droop  HEART: heart rate regular and mildly tachycardic, rhythm regular, no murmur, no peripheral pitting edema ,no   asymmetrical swelling of extremities, distal pulses equal and intact bilaterally  LUNGS: clear to auscultation bilaterally no rhonchi rales or wheezes, intermittent nonproductive cough  Extremities: Moving all extremities spontaneously, pelvis stable to compression, negative logroll testing bilaterally  NEURO: Awake and alert in room, no focal acute deficit,  DIAGNOSTIC RESULTS     RADIOLOGY (Per Emergency Physician):     Interpretation per the Radiologist below, if available at the time of this note:  XR chest 1 view   Final Result   1. No acute pulmonary pathology.   2. Large hiatal hernia.   Signed by Monico Quinteros MD            LABS:  Labs Reviewed   COMPREHENSIVE METABOLIC PANEL - Abnormal       Result Value    Glucose 95      Sodium 141      Potassium 4.0      Chloride 108 (*)     Bicarbonate 26      Anion Gap 11      Urea Nitrogen 31 (*)     Creatinine 0.75      eGFR >90      Calcium 8.3 (*)     Albumin 3.0 (*)     Alkaline Phosphatase 69      Total Protein 6.0 (*)     AST 48 (*)     Bilirubin, Total 0.7      ALT 26     CBC WITH AUTO DIFFERENTIAL - Abnormal    WBC 5.0      nRBC 0.0      RBC 3.50 (*)     Hemoglobin 10.0 (*)     Hematocrit 31.8 (*)     MCV 91      MCH 28.6      MCHC 31.4 (*)     RDW 13.7      Platelets 157      Neutrophils % 82.2      Immature Granulocytes %, Automated 0.4      Lymphocytes % 8.5      Monocytes % 8.9      Eosinophils % 0.0      Basophils % 0.0      Neutrophils Absolute 4.09      Immature Granulocytes Absolute, Automated 0.02      Lymphocytes Absolute 0.42 (*)     Monocytes Absolute 0.44      Eosinophils Absolute 0.00      Basophils Absolute 0.00      SARS-COV-2 AND INFLUENZA A/B PCR - Abnormal    Flu A Result Not Detected      Flu B Result Not Detected      Coronavirus 2019, PCR Detected (*)     Narrative:     This assay is an FDA-cleared, in vitro diagnostic nucleic acid amplification test for the qualitative detection and differentiation of SARS CoV-2/ Influenza A/B from nasopharyngeal specimens collected from individuals with signs and symptoms of respiratory tract infections, and has been validated for use at [facility]. Negative results do not preclude COVID-19/ Influenza A/B infections and should not be used as the sole basis for diagnosis, treatment, or other management decisions. Testing for SARS CoV-2 is recommended only for patients who meet current clinical and/or epidemiological criteria defined by federal, state, or local public health directives.   URINALYSIS WITH REFLEX CULTURE AND MICROSCOPIC - Abnormal    Color, Urine Yellow      Appearance, Urine Clear      Specific Gravity, Urine 1.031      pH, Urine 5.5      Protein, Urine 50 (1+) (*)     Glucose, Urine Normal      Blood, Urine 0.2 (2+) (*)     Ketones, Urine 10 (1+) (*)     Bilirubin, Urine NEGATIVE      Urobilinogen, Urine Normal      Nitrite, Urine NEGATIVE      Leukocyte Esterase, Urine NEGATIVE     URINALYSIS MICROSCOPIC WITH REFLEX CULTURE - Abnormal    WBC, Urine 1-5      RBC, Urine >20 (*)     Squamous Epithelial Cells, Urine 1-9 (SPARSE)      Mucus, Urine 2+     MAGNESIUM - Normal    Magnesium 1.91     RSV PCR - Normal    RSV PCR Not Detected      Narrative:     This assay is an FDA-cleared, in vitro diagnostic nucleic acid amplification test for the detection of RSV from nasopharyngeal specimens, and has been validated for use at [facility]. Negative results do not preclude RSV infections, and should not be used as the sole basis for diagnosis, treatment, or other management decisions. If Influenza A/B and RSV PCR results are  "negative, testing for Parainfluenza virus, Adenovirus and Metapneumovirus is routinely performed for pediatric oncology and intensive care inpatients at [facility] and is available on other patients by placing an add-on request.       URINALYSIS WITH REFLEX CULTURE AND MICROSCOPIC    Narrative:     The following orders were created for panel order Urinalysis with Reflex Culture and Microscopic.  Procedure                               Abnormality         Status                     ---------                               -----------         ------                     Urinalysis with Reflex C...[[id number]]  Abnormal            Final result               Extra Urine Gray Tube[[id number]]                                                         Please view results for these tests on the individual orders.   EXTRA URINE GRAY TUBE       All other labs were within normal range or not returned as of this dictation.    EMERGENCY DEPARTMENT COURSE :   Vitals:    Vitals:    01/31/25 1423 01/31/25 1516   BP: 153/86 153/86   Pulse: (!) 123 (!) 115   Resp: (!) 24 16   Temp: 37.1 °C (98.7 °F)    TempSrc: Temporal    SpO2: 95% 94%   Weight: 68.8 kg (151 lb 10.8 oz)    Height: 1.651 m (5' 5\")        Medications   sodium chloride 0.9 % bolus 1,000 mL (1,000 mL intravenous New Bag 1/31/25 1500)             PROCEDURES:  Unless otherwise noted below, none     Procedures    CRITICAL CARE TIME       FINAL IMPRESSION      1. Altered mental status, unspecified altered mental status type    2. Dehydration    3. Dementia, unspecified dementia severity, unspecified dementia type, unspecified whether behavioral, psychotic, or mood disturbance or anxiety (Multi)    4. COVID-19 virus infection          DISPOSITION    Admit 01/31/2025 04:16:28 PM    PATIENT REFERRED TO:  No follow-up provider specified.    DISCHARGE MEDICATIONS:  New Prescriptions    No medications on file          (Comment: Please note this report has been produced using speech " recognition software and may contain errors related to that system including errors in grammar, punctuation, and spelling, as well as words and phrases that may be inappropriate.  If there are any questions or concerns please feel free to contact the dictating provider for clarification.)    Earl Gallardo DO (electronically signed)  Emergency Medicine Provider     Earl Gallardo DO  01/31/25 0188

## 2025-02-01 LAB
ALBUMIN SERPL BCP-MCNC: 2.6 G/DL (ref 3.4–5)
ALP SERPL-CCNC: 58 U/L (ref 33–136)
ALT SERPL W P-5'-P-CCNC: 25 U/L (ref 10–52)
ANION GAP SERPL CALCULATED.3IONS-SCNC: 10 MMOL/L (ref 10–20)
AST SERPL W P-5'-P-CCNC: 49 U/L (ref 9–39)
BILIRUB SERPL-MCNC: 0.5 MG/DL (ref 0–1.2)
BUN SERPL-MCNC: 22 MG/DL (ref 6–23)
CALCIUM SERPL-MCNC: 7.7 MG/DL (ref 8.6–10.3)
CHLORIDE SERPL-SCNC: 109 MMOL/L (ref 98–107)
CO2 SERPL-SCNC: 26 MMOL/L (ref 21–32)
CREAT SERPL-MCNC: 0.58 MG/DL (ref 0.5–1.3)
EGFRCR SERPLBLD CKD-EPI 2021: >90 ML/MIN/1.73M*2
ERYTHROCYTE [DISTWIDTH] IN BLOOD BY AUTOMATED COUNT: 13.7 % (ref 11.5–14.5)
GLUCOSE SERPL-MCNC: 114 MG/DL (ref 74–99)
HCT VFR BLD AUTO: 28.7 % (ref 41–52)
HGB BLD-MCNC: 8.9 G/DL (ref 13.5–17.5)
MCH RBC QN AUTO: 28.1 PG (ref 26–34)
MCHC RBC AUTO-ENTMCNC: 31 G/DL (ref 32–36)
MCV RBC AUTO: 91 FL (ref 80–100)
NRBC BLD-RTO: 0 /100 WBCS (ref 0–0)
PLATELET # BLD AUTO: 145 X10*3/UL (ref 150–450)
POTASSIUM SERPL-SCNC: 3.5 MMOL/L (ref 3.5–5.3)
PROT SERPL-MCNC: 5.1 G/DL (ref 6.4–8.2)
RBC # BLD AUTO: 3.17 X10*6/UL (ref 4.5–5.9)
SODIUM SERPL-SCNC: 141 MMOL/L (ref 136–145)
WBC # BLD AUTO: 4.2 X10*3/UL (ref 4.4–11.3)

## 2025-02-01 PROCEDURE — 2500000001 HC RX 250 WO HCPCS SELF ADMINISTERED DRUGS (ALT 637 FOR MEDICARE OP): Performed by: INTERNAL MEDICINE

## 2025-02-01 PROCEDURE — 97165 OT EVAL LOW COMPLEX 30 MIN: CPT | Mod: GO

## 2025-02-01 PROCEDURE — 99233 SBSQ HOSP IP/OBS HIGH 50: CPT | Performed by: INTERNAL MEDICINE

## 2025-02-01 PROCEDURE — 97163 PT EVAL HIGH COMPLEX 45 MIN: CPT | Mod: GP

## 2025-02-01 PROCEDURE — 85027 COMPLETE CBC AUTOMATED: CPT | Performed by: INTERNAL MEDICINE

## 2025-02-01 PROCEDURE — 2500000004 HC RX 250 GENERAL PHARMACY W/ HCPCS (ALT 636 FOR OP/ED): Performed by: INTERNAL MEDICINE

## 2025-02-01 PROCEDURE — 2500000001 HC RX 250 WO HCPCS SELF ADMINISTERED DRUGS (ALT 637 FOR MEDICARE OP): Performed by: FAMILY MEDICINE

## 2025-02-01 PROCEDURE — 36415 COLL VENOUS BLD VENIPUNCTURE: CPT | Performed by: INTERNAL MEDICINE

## 2025-02-01 PROCEDURE — 2500000004 HC RX 250 GENERAL PHARMACY W/ HCPCS (ALT 636 FOR OP/ED): Performed by: FAMILY MEDICINE

## 2025-02-01 PROCEDURE — 1100000001 HC PRIVATE ROOM DAILY

## 2025-02-01 PROCEDURE — 84075 ASSAY ALKALINE PHOSPHATASE: CPT | Performed by: INTERNAL MEDICINE

## 2025-02-01 RX ADMIN — LISINOPRIL 10 MG: 10 TABLET ORAL at 09:52

## 2025-02-01 RX ADMIN — DEXTROSE MONOHYDRATE, SODIUM CHLORIDE, AND POTASSIUM CHLORIDE 100 ML/HR: 50; 4.5; 1.49 INJECTION, SOLUTION INTRAVENOUS at 17:37

## 2025-02-01 RX ADMIN — DOCUSATE SODIUM 100 MG: 100 CAPSULE, LIQUID FILLED ORAL at 09:52

## 2025-02-01 RX ADMIN — POLYETHYLENE GLYCOL 3350 17 G: 17 POWDER, FOR SOLUTION ORAL at 09:51

## 2025-02-01 RX ADMIN — DOCUSATE SODIUM 100 MG: 100 CAPSULE, LIQUID FILLED ORAL at 20:42

## 2025-02-01 RX ADMIN — PANTOPRAZOLE SODIUM 40 MG: 40 INJECTION, POWDER, FOR SOLUTION INTRAVENOUS at 06:01

## 2025-02-01 RX ADMIN — ENOXAPARIN SODIUM 40 MG: 40 INJECTION SUBCUTANEOUS at 09:51

## 2025-02-01 RX ADMIN — DEXTROSE MONOHYDRATE, SODIUM CHLORIDE, AND POTASSIUM CHLORIDE 100 ML/HR: 50; 4.5; 1.49 INJECTION, SOLUTION INTRAVENOUS at 06:56

## 2025-02-01 ASSESSMENT — COGNITIVE AND FUNCTIONAL STATUS - GENERAL
CLIMB 3 TO 5 STEPS WITH RAILING: TOTAL
EATING MEALS: A LOT
TURNING FROM BACK TO SIDE WHILE IN FLAT BAD: TOTAL
MOBILITY SCORE: 6
PERSONAL GROOMING: A LOT
DAILY ACTIVITIY SCORE: 6
TURNING FROM BACK TO SIDE WHILE IN FLAT BAD: A LOT
TOILETING: TOTAL
DAILY ACTIVITIY SCORE: 12
DRESSING REGULAR LOWER BODY CLOTHING: A LOT
STANDING UP FROM CHAIR USING ARMS: TOTAL
WALKING IN HOSPITAL ROOM: TOTAL
HELP NEEDED FOR BATHING: TOTAL
HELP NEEDED FOR BATHING: A LOT
STANDING UP FROM CHAIR USING ARMS: TOTAL
DRESSING REGULAR LOWER BODY CLOTHING: TOTAL
CLIMB 3 TO 5 STEPS WITH RAILING: TOTAL
TOILETING: A LOT
MOVING TO AND FROM BED TO CHAIR: TOTAL
EATING MEALS: TOTAL
DRESSING REGULAR UPPER BODY CLOTHING: A LOT
MOBILITY SCORE: 9
MOVING TO AND FROM BED TO CHAIR: A LOT
MOVING FROM LYING ON BACK TO SITTING ON SIDE OF FLAT BED WITH BEDRAILS: TOTAL
DRESSING REGULAR UPPER BODY CLOTHING: TOTAL
WALKING IN HOSPITAL ROOM: TOTAL
PERSONAL GROOMING: TOTAL
MOVING FROM LYING ON BACK TO SITTING ON SIDE OF FLAT BED WITH BEDRAILS: A LOT

## 2025-02-01 ASSESSMENT — ACTIVITIES OF DAILY LIVING (ADL)
ADL_ASSISTANCE: NEEDS ASSISTANCE
BATHING_ASSISTANCE: TOTAL
ADL_ASSISTANCE: NEEDS ASSISTANCE

## 2025-02-01 ASSESSMENT — PAIN - FUNCTIONAL ASSESSMENT
PAIN_FUNCTIONAL_ASSESSMENT: FLACC (FACE, LEGS, ACTIVITY, CRY, CONSOLABILITY)
PAIN_FUNCTIONAL_ASSESSMENT: FLACC (FACE, LEGS, ACTIVITY, CRY, CONSOLABILITY)

## 2025-02-01 ASSESSMENT — PAIN SCALES - GENERAL
PAINLEVEL_OUTOF10: 0 - NO PAIN

## 2025-02-01 NOTE — CARE PLAN
The patient's goals for the shift include unabe to assess    The clinical goals for the shift include safety, comfort      Problem: Skin  Goal: Decreased wound size/increased tissue granulation at next dressing change  2/1/2025 1225 by Skylar Moeller RN  Outcome: Progressing  2/1/2025 1225 by Skylar Moeller RN  Flowsheets (Taken 2/1/2025 1225)  Decreased wound size/increased tissue granulation at next dressing change:   Protective dressings over bony prominences   Promote sleep for wound healing   Utilize specialty bed per algorithm  Goal: Participates in plan/prevention/treatment measures  Outcome: Progressing  Goal: Prevent/manage excess moisture  Outcome: Progressing  Goal: Prevent/minimize sheer/friction injuries  Outcome: Progressing  Goal: Promote/optimize nutrition  Outcome: Progressing  Goal: Promote skin healing  Outcome: Progressing     Problem: Fall/Injury  Goal: Not fall by end of shift  Outcome: Progressing  Goal: Be free from injury by end of the shift  Outcome: Progressing  Goal: Verbalize understanding of personal risk factors for fall in the hospital  Outcome: Progressing  Goal: Verbalize understanding of risk factor reduction measures to prevent injury from fall in the home  Outcome: Progressing  Goal: Use assistive devices by end of the shift  Outcome: Progressing  Goal: Pace activities to prevent fatigue by end of the shift  Outcome: Progressing

## 2025-02-01 NOTE — PROGRESS NOTES
"Elian Betancourt is a 79 y.o. male on day 1 of admission presenting with Altered mental status, unspecified altered mental status type.    Subjective   Patient very sleepy this morning.  Remains confused at his baseline       Objective     Physical Exam  Vitals reviewed.   Constitutional:       Appearance: Normal appearance.   HENT:      Head: Normocephalic and atraumatic.      Right Ear: Tympanic membrane, ear canal and external ear normal.      Left Ear: Tympanic membrane, ear canal and external ear normal.      Nose: Nose normal.      Mouth/Throat:      Pharynx: Oropharynx is clear.   Eyes:      Extraocular Movements: Extraocular movements intact.      Conjunctiva/sclera: Conjunctivae normal.      Pupils: Pupils are equal, round, and reactive to light.   Cardiovascular:      Rate and Rhythm: Normal rate and regular rhythm.      Pulses: Normal pulses.      Heart sounds: Normal heart sounds.   Pulmonary:      Effort: Pulmonary effort is normal.      Breath sounds: Normal breath sounds.   Abdominal:      General: Abdomen is flat. Bowel sounds are normal.      Palpations: Abdomen is soft.   Musculoskeletal:      Cervical back: Normal range of motion and neck supple.   Skin:     General: Skin is warm and dry.   Neurological:      General: No focal deficit present.      Mental Status: He is alert.   Psychiatric:         Mood and Affect: Mood normal.         Last Recorded Vitals  Blood pressure 147/87, pulse (!) 114, temperature 37.1 °C (98.8 °F), temperature source Temporal, resp. rate 20, height 1.651 m (5' 5\"), weight 68.8 kg (151 lb 10.8 oz), SpO2 94%.  Intake/Output last 3 Shifts:  No intake/output data recorded.    Relevant Results               Scheduled medications  docusate sodium, 100 mg, oral, BID  enoxaparin, 40 mg, subcutaneous, Daily  lisinopril, 10 mg, oral, Daily  pantoprazole, 40 mg, oral, Daily before breakfast   Or  pantoprazole, 40 mg, intravenous, Daily before breakfast  polyethylene glycol, 17 g, " oral, Daily      Continuous medications  potassium djbruak-T2-7.45%NaCl, 100 mL/hr, Last Rate: 100 mL/hr (02/01/25 0656)      PRN medications  PRN medications: acetaminophen **OR** acetaminophen **OR** acetaminophen, acetaminophen **OR** acetaminophen **OR** acetaminophen, benzocaine-menthol, dextromethorphan-guaifenesin, guaiFENesin, melatonin, ondansetron ODT **OR** ondansetron  Results for orders placed or performed during the hospital encounter of 01/31/25 (from the past 24 hours)   Comprehensive metabolic panel   Result Value Ref Range    Glucose 114 (H) 74 - 99 mg/dL    Sodium 141 136 - 145 mmol/L    Potassium 3.5 3.5 - 5.3 mmol/L    Chloride 109 (H) 98 - 107 mmol/L    Bicarbonate 26 21 - 32 mmol/L    Anion Gap 10 10 - 20 mmol/L    Urea Nitrogen 22 6 - 23 mg/dL    Creatinine 0.58 0.50 - 1.30 mg/dL    eGFR >90 >60 mL/min/1.73m*2    Calcium 7.7 (L) 8.6 - 10.3 mg/dL    Albumin 2.6 (L) 3.4 - 5.0 g/dL    Alkaline Phosphatase 58 33 - 136 U/L    Total Protein 5.1 (L) 6.4 - 8.2 g/dL    AST 49 (H) 9 - 39 U/L    Bilirubin, Total 0.5 0.0 - 1.2 mg/dL    ALT 25 10 - 52 U/L   CBC   Result Value Ref Range    WBC 4.2 (L) 4.4 - 11.3 x10*3/uL    nRBC 0.0 0.0 - 0.0 /100 WBCs    RBC 3.17 (L) 4.50 - 5.90 x10*6/uL    Hemoglobin 8.9 (L) 13.5 - 17.5 g/dL    Hematocrit 28.7 (L) 41.0 - 52.0 %    MCV 91 80 - 100 fL    MCH 28.1 26.0 - 34.0 pg    MCHC 31.0 (L) 32.0 - 36.0 g/dL    RDW 13.7 11.5 - 14.5 %    Platelets 145 (L) 150 - 450 x10*3/uL     XR chest 1 view    Result Date: 1/31/2025  STUDY: Chest Radiograph;  1/31/25 at 3:14 PM INDICATION: Shortness of breath. COMPARISON: Chest XR 12/26/24. ACCESSION NUMBER(S): [id number] ORDERING CLINICIAN: ELIE HOUSE TECHNIQUE:  Frontal chest was obtained at 1513 hours. FINDINGS: The examination is limited due to suboptimal patient positioning. CARDIOMEDIASTINAL SILHOUETTE: Cardiomediastinal silhouette is normal in size and configuration. There is a large hiatal hernia.  LUNGS: Lungs are  clear.  ABDOMEN: No remarkable upper abdominal findings.  BONES: No acute osseous changes.    1. No acute pulmonary pathology. 2. Large hiatal hernia. Signed by Monico Quinteros MD                  Assessment/Plan   Assessment & Plan  Altered mental status, unspecified altered mental status type    COVID-19    Dehydration    Atrial fibrillation, unspecified type (Multi)    Benign prostatic hyperplasia    Dementia    Disability of walking    Hypertension    Pure hypercholesterolemia    Clinically patient is not symptomatic with COVID  He has no respiratory symptoms  He is not hypoxic  White cell count normal  Will monitor him off the remdesivir  Blood pressure is stable  Chronic anemia he is at his baseline  D/W wife patient is really declining fast in past few weeks . He has dementia for past few years  Wants him to be DNR ml  Wants to consider hospice       I spent  minutes in the professional and overall care of this patient.      Deepali Goldman MD

## 2025-02-01 NOTE — CARE PLAN
Problem: Skin  Goal: Decreased wound size/increased tissue granulation at next dressing change  Outcome: Progressing  Goal: Participates in plan/prevention/treatment measures  Outcome: Progressing  Goal: Prevent/manage excess moisture  Outcome: Progressing  Goal: Prevent/minimize sheer/friction injuries  Outcome: Progressing  Goal: Promote/optimize nutrition  Outcome: Progressing  Goal: Promote skin healing  Outcome: Progressing   The patient's goals for the shift include unabe to assess    The clinical goals for the shift include safety, comfort

## 2025-02-01 NOTE — H&P
History Of Present Illness  Elian Betancourt is a 79 y.o. male presenting with altered mental status and 3 falls yesterday.  Patient has past medical history of dementia, DJD, hypertension, dyslipidemia, recurrent falls, type B aortic dissection, patient was recently discharged from the rehab facility.  His wife brought him to the hospital because she could not take care of things falling and his confusion is getting worse with increasing agitation.  Patient was found to have COVID-19, dehydration     Past Medical History  Past Medical History:   Diagnosis Date    Abdominal pain 03/08/2024    Agitation     Allergic rhinitis     Aortic dissection (Multi)     Arthritis     At risk for falling     Back pain     BPH (benign prostatic hyperplasia)     Cellulitis of left lower limb 04/26/2023    Chronic maxillary sinusitis 09/17/2023    Chronic pancreatitis (Multi)     Clavicle fracture     Closed fracture of acromial end of clavicle 04/09/2024    Contusion of face 03/08/2024    DDD (degenerative disc disease), lumbar     DDD (degenerative disc disease), thoracic     Dementia     Difficulty in walking, not elsewhere classified 04/26/2023    Dysfunction of eustachian tube 09/17/2023    Epistaxis 09/17/2023    Facial laceration 03/08/2024    Hiatal hernia     HTN (hypertension)     Hypercholesteremia     Hyperlipidemia     Injury of back 09/17/2023    Leg laceration     Leukopenia     Muscle weakness (generalized) 04/26/2023    Nasal discharge 09/17/2023    Osteoarthritis     Pain of left shoulder region 04/09/2024    Personal history of other specified conditions 06/23/2014    History of shortness of breath    Polyp of nasal cavity 03/08/2024    Repeated falls 04/26/2023    Rib fractures     Scoliosis 04/26/2023    Varicose veins of both legs with edema     Violent behavior     Weakness 04/25/2023       Surgical History  Past Surgical History:   Procedure Laterality Date    MYRINGOTOMY W/ TUBES  10/30/2013    Myringotomy -  "With Ventilating Tube Insertion    SEPTOPLASTY  09/16/2014    Septoplasty    TONSILLECTOMY  10/30/2013    Tonsillectomy With Adenoidectomy        Social History  He reports that he has never smoked. He has never been exposed to tobacco smoke. He has never used smokeless tobacco. He reports that he does not drink alcohol and does not use drugs.    Family History  Family History   Problem Relation Name Age of Onset    Hypertension Mother      Dementia Mother      Lung cancer Father      Stroke Other Grandfather         Allergies  Patient has no known allergies.    Review of Systems  Patient poor historian  Physical Exam  Vitals reviewed.   Constitutional:       Appearance: Normal appearance.   HENT:      Head: Normocephalic and atraumatic.      Right Ear: Tympanic membrane, ear canal and external ear normal.      Left Ear: Tympanic membrane, ear canal and external ear normal.      Nose: Nose normal.      Mouth/Throat:      Pharynx: Oropharynx is clear.   Eyes:      Extraocular Movements: Extraocular movements intact.      Conjunctiva/sclera: Conjunctivae normal.      Pupils: Pupils are equal, round, and reactive to light.   Cardiovascular:      Rate and Rhythm: Normal rate and regular rhythm.      Pulses: Normal pulses.      Heart sounds: Normal heart sounds.   Pulmonary:      Effort: Pulmonary effort is normal.      Breath sounds: Normal breath sounds.   Abdominal:      General: Abdomen is flat. Bowel sounds are normal.      Palpations: Abdomen is soft.   Musculoskeletal:      Cervical back: Normal range of motion and neck supple.   Skin:     General: Skin is warm and dry.   Neurological:      General: No focal deficit present.      Mental Status: He is alert.   Psychiatric:         Mood and Affect: Mood normal.          Last Recorded Vitals  Blood pressure (!) 143/94, pulse (!) 114, temperature 37.1 °C (98.8 °F), temperature source Temporal, resp. rate 22, height 1.651 m (5' 5\"), weight 68.8 kg (151 lb 10.8 oz), " SpO2 94%.    Relevant Results        Scheduled medications  docusate sodium, 100 mg, oral, BID  enoxaparin, 40 mg, subcutaneous, Daily  lisinopril, 10 mg, oral, Daily  [START ON 2/1/2025] pantoprazole, 40 mg, oral, Daily before breakfast   Or  [START ON 2/1/2025] pantoprazole, 40 mg, intravenous, Daily before breakfast  polyethylene glycol, 17 g, oral, Daily      Continuous medications  potassium uqvhizh-U0-2.45%NaCl, 100 mL/hr, Last Rate: 100 mL/hr (01/31/25 2057)      PRN medications  PRN medications: acetaminophen **OR** acetaminophen **OR** acetaminophen, acetaminophen **OR** acetaminophen **OR** acetaminophen, benzocaine-menthol, dextromethorphan-guaifenesin, guaiFENesin, melatonin, ondansetron ODT **OR** ondansetron  Results for orders placed or performed during the hospital encounter of 01/31/25 (from the past 24 hours)   Comprehensive metabolic panel   Result Value Ref Range    Glucose 95 74 - 99 mg/dL    Sodium 141 136 - 145 mmol/L    Potassium 4.0 3.5 - 5.3 mmol/L    Chloride 108 (H) 98 - 107 mmol/L    Bicarbonate 26 21 - 32 mmol/L    Anion Gap 11 10 - 20 mmol/L    Urea Nitrogen 31 (H) 6 - 23 mg/dL    Creatinine 0.75 0.50 - 1.30 mg/dL    eGFR >90 >60 mL/min/1.73m*2    Calcium 8.3 (L) 8.6 - 10.3 mg/dL    Albumin 3.0 (L) 3.4 - 5.0 g/dL    Alkaline Phosphatase 69 33 - 136 U/L    Total Protein 6.0 (L) 6.4 - 8.2 g/dL    AST 48 (H) 9 - 39 U/L    Bilirubin, Total 0.7 0.0 - 1.2 mg/dL    ALT 26 10 - 52 U/L   Magnesium   Result Value Ref Range    Magnesium 1.91 1.60 - 2.40 mg/dL   CBC and Auto Differential   Result Value Ref Range    WBC 5.0 4.4 - 11.3 x10*3/uL    nRBC 0.0 0.0 - 0.0 /100 WBCs    RBC 3.50 (L) 4.50 - 5.90 x10*6/uL    Hemoglobin 10.0 (L) 13.5 - 17.5 g/dL    Hematocrit 31.8 (L) 41.0 - 52.0 %    MCV 91 80 - 100 fL    MCH 28.6 26.0 - 34.0 pg    MCHC 31.4 (L) 32.0 - 36.0 g/dL    RDW 13.7 11.5 - 14.5 %    Platelets 157 150 - 450 x10*3/uL    Neutrophils % 82.2 40.0 - 80.0 %    Immature Granulocytes %,  Automated 0.4 0.0 - 0.9 %    Lymphocytes % 8.5 13.0 - 44.0 %    Monocytes % 8.9 2.0 - 10.0 %    Eosinophils % 0.0 0.0 - 6.0 %    Basophils % 0.0 0.0 - 2.0 %    Neutrophils Absolute 4.09 1.60 - 5.50 x10*3/uL    Immature Granulocytes Absolute, Automated 0.02 0.00 - 0.50 x10*3/uL    Lymphocytes Absolute 0.42 (L) 0.80 - 3.00 x10*3/uL    Monocytes Absolute 0.44 0.05 - 0.80 x10*3/uL    Eosinophils Absolute 0.00 0.00 - 0.40 x10*3/uL    Basophils Absolute 0.00 0.00 - 0.10 x10*3/uL   Sars-CoV-2 and Influenza A/B PCR   Result Value Ref Range    Flu A Result Not Detected Not Detected    Flu B Result Not Detected Not Detected    Coronavirus 2019, PCR Detected (A) Not Detected   RSV PCR   Result Value Ref Range    RSV PCR Not Detected Not Detected   Urinalysis with Reflex Culture and Microscopic   Result Value Ref Range    Color, Urine Yellow Light-Yellow, Yellow, Dark-Yellow    Appearance, Urine Clear Clear    Specific Gravity, Urine 1.031 1.005 - 1.035    pH, Urine 5.5 5.0, 5.5, 6.0, 6.5, 7.0, 7.5, 8.0    Protein, Urine 50 (1+) (A) NEGATIVE, 10 (TRACE), 20 (TRACE) mg/dL    Glucose, Urine Normal Normal mg/dL    Blood, Urine 0.2 (2+) (A) NEGATIVE    Ketones, Urine 10 (1+) (A) NEGATIVE mg/dL    Bilirubin, Urine NEGATIVE NEGATIVE    Urobilinogen, Urine Normal Normal mg/dL    Nitrite, Urine NEGATIVE NEGATIVE    Leukocyte Esterase, Urine NEGATIVE NEGATIVE   Urinalysis Microscopic   Result Value Ref Range    WBC, Urine 1-5 1-5, NONE /HPF    RBC, Urine >20 (A) NONE, 1-2, 3-5 /HPF    Squamous Epithelial Cells, Urine 1-9 (SPARSE) Reference range not established. /HPF    Mucus, Urine 2+ Reference range not established. /LPF     XR chest 1 view    Result Date: 1/31/2025  STUDY: Chest Radiograph;  1/31/25 at 3:14 PM INDICATION: Shortness of breath. COMPARISON: Chest XR 12/26/24. ACCESSION NUMBER(S): [id number] ORDERING CLINICIAN: ELIE HOUSE TECHNIQUE:  Frontal chest was obtained at 1513 hours. FINDINGS: The examination is limited  due to suboptimal patient positioning. CARDIOMEDIASTINAL SILHOUETTE: Cardiomediastinal silhouette is normal in size and configuration. There is a large hiatal hernia.  LUNGS: Lungs are clear.  ABDOMEN: No remarkable upper abdominal findings.  BONES: No acute osseous changes.    1. No acute pulmonary pathology. 2. Large hiatal hernia. Signed by Monico Quinteros MD        Assessment/Plan   Assessment & Plan  Altered mental status, unspecified altered mental status type    COVID-19    Dehydration    Atrial fibrillation, unspecified type (Multi)    Benign prostatic hyperplasia    Dementia    Disability of walking    Hypertension    Pure hypercholesterolemia      Will keep patient in COVID isolation  Clinically patient does not appear to be in any respiratory distress or having symptoms  Will hold off remdesivir and Decadron  Will consult ID  Gentle IV fluids  Continue home medications  PT OT  Social service consult for rehab and long-term placement       I spent  minutes in the professional and overall care of this patient.      Deepali Goldman MD

## 2025-02-01 NOTE — PROGRESS NOTES
Physical Therapy    Physical Therapy Evaluation & Treatment    Patient Name: Elian Betancourt  MRN: [id number]  Department: [address]  Room: [address] [place] [address]  Today's Date: 2/1/2025   Time Calculation  Start Time: 1215  Stop Time: 1235  Time Calculation (min): 20 min    Assessment/Plan   PT Assessment  PT Assessment Results: Decreased strength, Decreased range of motion, Decreased endurance, Impaired balance, Decreased mobility  Rehab Prognosis: Fair  Strengths: Support of Caregivers  Barriers to Participation: Comorbidities, Ability to acquire knowledge  End of Session Communication: Bedside nurse  End of Session Patient Position: Bed, 3 rail up, Alarm on   IP OR SWING BED PT PLAN  Inpatient or Swing Bed: Inpatient  PT Plan  PT Plan: Ongoing PT  PT Frequency: 2 times per week (PT Trial)  PT Discharge Recommendations: Moderate intensity level of continued care, 24 hr supervision due to cognition  Equipment Recommended upon Discharge:  (total care/assist)  PT Recommended Transfer Status: Total assist, Assist x2  PT - OK to Discharge: Yes      Subjective     General Visit Information:  General  Reason for Referral: decline in mobility, AMS  Referred By: Dr. TYRONE Goldman  Past Medical History Relevant to Rehab: dementia, back injury, a-fib, dehydration, left sided epistaxis, DJD, hypertension, dyslipidemia, recurrent falls, type B aortic dissection  Family/Caregiver Present: No  Co-Treatment: OT  Co-Treatment Reason: safety with OOB mobility and decreased activity tolerance  Prior to Session Communication: Bedside nurse  Patient Position Received: Bed, 4 rail up, Alarm on  Preferred Learning Style: verbal  General Comment: Patient is an 79 year old male who presented to the ED with c/o AMS. Per EMR, patient had 3 falls in one day. He was recently discharged from SNF and was home for a short period of time. He had a recent type B dissection. Patient is admitted with COVID+, dehydration, falls, AMS. He is cleared for therapy.  Home  Living:  Home Living  Home Living Comments: patient is unable to provide any intake information. per EMR, patient was recently discharged from SNF and returned home with his wife. patient unable to report any home set up or level of function due to cognition  Prior Level of Function:  Prior Function Per Pt/Caregiver Report  Level of [place]: Independent with ADLs and functional transfers  Receives Help From: Family  ADL Assistance: Needs assistance  Precautions:  Precautions  Medical Precautions: Fall precautions, Infection precautions  Precautions Comment: COVID+    Objective   Pain:  Pain Assessment  Pain Assessment: FLACC (Face, Legs, Activity, Cry, Consolability)  0-10 (Numeric) Pain Score: 0 - No pain  Clinical Progression: Not changed  Cognition:  Cognition  Overall Cognitive Status: Impaired at baseline  Orientation Level: Disoriented X4  Cognition Comments: keeps eyes closed often, does not follow commands and is difficult to re-direct    General Assessments:  General Observation  General Observation: 79 year old male who presented to the ED with c/o AMS. Per EMR, patient had 3 falls in one day. pt is unable to provided PLOF status, he is DEP A x 2 for his safety. Pt is limited by confusion and cognition. PT recommends 24/7 care at discharge.       Activity Tolerance  Endurance: Decreased tolerance for upright activites  Activity Tolerance Comments: minimal participation, agitated    Sensation  Sensation Comment: patient unable to self-report    Strength  Strength Comments: BLE knee/hips, and ankles in flex position, unable to formally assess  Strength  Strength Comments: BLE knee/hips, and ankles in flex position, unable to formally assess      Coordination  Movements are Fluid and Coordinated: No (movements are resistive)    Static Sitting Balance  Static Sitting-Balance Support: Feet supported  Static Sitting-Level of Assistance: Dependent  Dynamic Sitting Balance  Dynamic Sitting-Balance Support:  Feet supported  Dynamic Sitting-Level of Assistance: Dependent    Static Standing Balance  Static Standing-Comment/Number of Minutes: not safe to attempt  Dynamic Standing Balance  Dynamic Standing-Comments: not safe to attempt  Functional Assessments:     Bed Mobility  Bed Mobility: Yes  Bed Mobility 1  Bed Mobility 1: Rolling right, Rolling left  Level of Assistance 1: Dependent, +2  Bed Mobility Comments 1: assist to roll and change linens post episode of incontinence  Bed Mobility 2  Bed Mobility  2: Supine to sitting  Level of Assistance 2: Dependent, +2  Bed Mobility Comments 2: assit with trunk and BLEs  Bed Mobility 3  Bed Mobility 3: Sitting to supine  Level of Assistance 3: Dependent, +2  Bed Mobility Comments 3: assist with trunk and BLEs    Transfers  Transfer: No (not safe to attempt)    Ambulation/Gait Training  Ambulation/Gait Training Performed: No    Stairs  Stairs: No    Bed Mobility  Bed Mobility: Yes  Bed Mobility 1  Bed Mobility 1: Rolling right, Rolling left  Level of Assistance 1: Dependent, +2  Bed Mobility Comments 1: assist to roll and change linens post episode of incontinence  Bed Mobility 2  Bed Mobility  2: Supine to sitting  Level of Assistance 2: Dependent, +2  Bed Mobility Comments 2: assit with trunk and BLEs  Bed Mobility 3  Bed Mobility 3: Sitting to supine  Level of Assistance 3: Dependent, +2  Bed Mobility Comments 3: assist with trunk and BLEs    Ambulation/Gait Training  Ambulation/Gait Training Performed: No  Transfers  Transfer: No (not safe to attempt)    Stairs  Stairs: No    Outcome Measures:  [facility] Basic Mobility  Turning from your back to your side while in a flat bed without using bedrails: Total  Moving from lying on your back to sitting on the side of a flat bed without using bedrails: Total  Moving to and from bed to chair (including a wheelchair): Total  Standing up from a chair using your arms (e.g. wheelchair or bedside chair): Total  To walk in hospital room:  Total  Climbing 3-5 steps with railing: Total  Basic Mobility - Total Score: 6    Encounter Problems       Encounter Problems (Active)       Balance       Patient will maintain balance fair sitting balance in order to prepare for transfers. (Progressing)       Start:  02/01/25    Expected End:  02/15/25               PT Transfers       Patient will transfer from one surface to another with MAX A for safety with use of RW (Progressing)       Start:  02/01/25    Expected End:  02/15/25            Patient will perform bed mobility with MAX A x 1 for safety with trunk and BLE assistance to maintain his safety. (Progressing)       Start:  02/01/25    Expected End:  02/15/25                   Education Documentation  Precautions, taught by Amanda Salvador PT at 2/1/2025  2:32 PM.  Learner: Patient  Readiness: Nonacceptance  Method: Explanation  Response: Needs Reinforcement    Mobility Training, taught by Amanda Salvador PT at 2/1/2025  2:32 PM.  Learner: Patient  Readiness: Nonacceptance  Method: Explanation  Response: Needs Reinforcement    Education Comments  No comments found.

## 2025-02-01 NOTE — PROGRESS NOTES
Occupational Therapy    Evaluation    Patient Name: Elian Betancourt  MRN: [id number]  Department: [address]  Room: [address] [place] [address]  Today's Date: 2/1/2025  Time Calculation  Start Time: 1212  Stop Time: 1231  Time Calculation (min): 19 min    Assessment  IP OT Assessment  OT Assessment: Patient is an 79 year old male admitted with COVID+, AMS, and deyhdration. At time of evaluation, patient is not able to safely follow any commands. Will trial skilled OT, as patient would benefit to increase his safety and independence with daily tasks.  Prognosis: Fair  Barriers to Discharge Home: Caregiver assistance, Cognition needs, Physical needs  Caregiver Assistance: Caregiver assistance needed per identified barriers - however, level of patient's required assistance exceeds assistance available at home  Cognition Needs: 24hr supervision for safety awareness needed, Insight of patient limited regarding functional ability/needs  Physical Needs: 24hr mobility assistance needed, 24hr ADL assistance needed, High falls risk due to function or environment  Evaluation/Treatment Tolerance: Treatment limited secondary to agitation, Other (Comment) (poor command following)  End of Session Communication: Bedside nurse  End of Session Patient Position: Bed, 3 rail up, Alarm on  Plan:  Treatment Interventions: ADL retraining, Functional transfer training, UE strengthening/ROM, Endurance training, Patient/family training, Cognitive reorientation, Equipment evaluation/education, Compensatory technique education  OT Frequency: 2 times per week (TRIAL)  OT Discharge Recommendations: Moderate intensity level of continued care, 24 hr supervision due to cognition (TRIAL)  Equipment Recommended upon Discharge:  (TBD)  OT Recommended Transfer Status: Dependent  OT - OK to Discharge: Yes    Subjective   Current Problem:  1. Altered mental status, unspecified altered mental status type        2. Dehydration        3. Dementia, unspecified dementia severity,  unspecified dementia type, unspecified whether behavioral, psychotic, or mood disturbance or anxiety (Multi)        4. COVID-19 virus infection          General:  General  Reason for Referral: decline in ADLs, AMS  Referred By: Dr. TYRONE Goldman  Past Medical History Relevant to Rehab: dementia, back injury, a-fib, dehydration, left sided epistaxis, DJD, hypertension, dyslipidemia, recurrent falls, type B aortic dissection  Family/Caregiver Present: No  Co-Treatment: PT  Co-Treatment Reason: safety with OOB mobility and decreased activity tolerance  Prior to Session Communication: Bedside nurse  Patient Position Received: Bed, 4 rail up, Alarm on  Preferred Learning Style: verbal  General Comment: Patient is an 79 year old male who presented to the ED with c/o AMS. Per EMR, patient had 3 falls in one day. He was recently discharged from SNF and was home for a short period of time. He had a recent type B dissection. Patient is admitted with COVID+, dehydration, falls, AMS. He is cleared for therapy.  Precautions:  Medical Precautions: Fall precautions, Infection precautions  Precautions Comment: COVID+    Pain:  Pain Assessment  Pain Assessment: FLACC (Face, Legs, Activity, Cry, Consolability)  0-10 (Numeric) Pain Score: 0 - No pain    Objective   Cognition:  Overall Cognitive Status: Impaired at baseline (hx of dementia)  Orientation Level: Disoriented X4  Cognition Comments: keeps eyes closed often, does not follow commands and is difficult to re-direct     Home Living:  Home Living Comments: patient is unable to provide any intake information. per EMR, patient was recently discharged from SNF and returned home with his wife. patient unable to report any home set up or level of function due to cognition   Prior Function:  Level of [place]: Needs assistance with ADLs, Needs assistance with homemaking (assume assist?)  Receives Help From: Family  ADL Assistance: Needs assistance (assume assist?)  Homemaking Assistance:   (assume assist?)  Ambulatory Assistance:  (assist?)  IADL History:  Homemaking Responsibilities: No  ADL:  Eating Assistance: Moderate  Eating Deficit: Verbal cueing, Supervision/safety, Increased time to complete, Bringing food to mouth assist, Scoop assist (per clinical judgement)  Grooming Assistance: Total  Grooming Deficit:  (per clinical judgement)  Bathing Assistance: Total  Bathing Deficit:  (per clinical judgement)  UE Dressing Assistance: Total  UE Dressing Deficit: Thread RUE, Thread LUE, Pull over head, Pull around back, Pull down in back  LE Dressing Assistance: Total  LE Dressing Deficit: Don/doff R sock, Don/doff L sock  Toileting Assistance with Device: Total  Toileting Deficit: Incontinent  Activity Tolerance:  Endurance: Decreased tolerance for upright activites  Activity Tolerance Comments: minimal participation, agitated  Bed Mobility/Transfers: Bed Mobility  Bed Mobility: Yes  Bed Mobility 1  Bed Mobility 1: Rolling right, Rolling left  Level of Assistance 1: Dependent, +2  Bed Mobility Comments 1: assist to roll and change linens post episode of incontinence  Bed Mobility 2  Bed Mobility  2: Supine to sitting  Level of Assistance 2: Dependent, +2  Bed Mobility Comments 2: assist to move B LE and raise trunk  Bed Mobility 3  Bed Mobility 3: Sitting to supine  Level of Assistance 3: Dependent, +2  Bed Mobility Comments 3: assist to raise B LE and support trunk    Transfers  Transfer: No (patient not following commands, not safe to attempt)    Functional Mobility:  Functional Mobility  Functional Mobility Performed: No (not safe to attempt)  Sitting Balance:  Static Sitting Balance  Static Sitting-Balance Support: Feet supported, Bilateral upper extremity supported  Static Sitting-Level of Assistance: Dependent  Static Sitting-Comment/Number of Minutes: transfered patient to EOB with hopeful arousal, however, patient continues to keep his eyes closed and does not follow any commands    IADL's:    Homemaking Responsibilities: No    Sensation:  Sensation Comment: patient unable to self-report  Strength:  Strength Comments: B UE impaired grossly. unable to formally assess  Coordination:  Movements are Fluid and Coordinated: No     Extremities: RUE   RUE : Exceptions to WFL (generalized weakness) and LUE   LUE: Exceptions to WFL (generalized weakness)    Outcome Measures: [facility] Daily Activity  Putting on and taking off regular lower body clothing: Total  Bathing (including washing, rinsing, drying): Total  Putting on and taking off regular upper body clothing: Total  Toileting, which includes using toilet, bedpan or urinal: Total  Taking care of personal grooming such as brushing teeth: Total  Eating Meals: Total  Daily Activity - Total Score: 6    Education Documentation  Body Mechanics, taught by Cady Salazar OT at 2/1/2025 12:52 PM.  Learner: Patient  Readiness: Nonacceptance  Method: Explanation, Demonstration  Response: Needs Reinforcement, No Evidence of Learning  Comment: patient unable to follow any commands    Education Comments  No comments found.      Goals:   Encounter Problems       Encounter Problems (Active)       OT Goals       ADLs (Progressing)       Start:  02/01/25    Expected End:  02/22/25       Patient will complete UB ADL tasks, with minimal assist  using AE need in order to increase patient's safety and independence with self-care tasks.          Safety (Progressing)       Start:  02/01/25    Expected End:  02/22/25       Patient will follow simple, one step motor commands 25% of the time, in order to increase participation with daily tasks.         Activity Tolerance  (Progressing)       Start:  02/01/25    Expected End:  02/22/25       Patient will demonstrate the ability to participate in functional activity at least >/= 10 minutes in order to increase patient's safety and independence with daily tasks.          Functional Transfers (Progressing)       Start:  02/01/25     Expected End:  02/22/25       Patient will complete functional transfers with minimal assist using least restrictive device, in order to increase patient's safety and independence with daily tasks.          Sitting Balance (Progressing)       Start:  02/01/25    Expected End:  02/22/25       Patient will sit EOB at least >/= 10 minutes with CGA, to increase safety and independence with daily tasks.

## 2025-02-02 ENCOUNTER — APPOINTMENT (OUTPATIENT)
Dept: CARDIOLOGY | Facility: HOSPITAL | Age: 80
End: 2025-02-02
Payer: MEDICARE

## 2025-02-02 VITALS
RESPIRATION RATE: 26 BRPM | TEMPERATURE: 98.1 F | HEIGHT: 65 IN | WEIGHT: 151.68 LBS | OXYGEN SATURATION: 97 % | DIASTOLIC BLOOD PRESSURE: 53 MMHG | BODY MASS INDEX: 25.27 KG/M2 | HEART RATE: 55 BPM | SYSTOLIC BLOOD PRESSURE: 116 MMHG

## 2025-02-02 PROCEDURE — 2500000004 HC RX 250 GENERAL PHARMACY W/ HCPCS (ALT 636 FOR OP/ED): Performed by: FAMILY MEDICINE

## 2025-02-02 PROCEDURE — 2500000001 HC RX 250 WO HCPCS SELF ADMINISTERED DRUGS (ALT 637 FOR MEDICARE OP): Performed by: FAMILY MEDICINE

## 2025-02-02 PROCEDURE — 2500000001 HC RX 250 WO HCPCS SELF ADMINISTERED DRUGS (ALT 637 FOR MEDICARE OP): Performed by: INTERNAL MEDICINE

## 2025-02-02 PROCEDURE — 2500000004 HC RX 250 GENERAL PHARMACY W/ HCPCS (ALT 636 FOR OP/ED): Performed by: INTERNAL MEDICINE

## 2025-02-02 PROCEDURE — 1100000001 HC PRIVATE ROOM DAILY

## 2025-02-02 PROCEDURE — 93005 ELECTROCARDIOGRAM TRACING: CPT

## 2025-02-02 RX ORDER — BISOPROLOL FUMARATE 5 MG/1
10 TABLET, FILM COATED ORAL DAILY
Status: DISPENSED | OUTPATIENT
Start: 2025-02-02

## 2025-02-02 RX ADMIN — LISINOPRIL 10 MG: 10 TABLET ORAL at 09:00

## 2025-02-02 RX ADMIN — POLYETHYLENE GLYCOL 3350 17 G: 17 POWDER, FOR SOLUTION ORAL at 09:00

## 2025-02-02 RX ADMIN — DEXTROSE MONOHYDRATE, SODIUM CHLORIDE, AND POTASSIUM CHLORIDE 100 ML/HR: 50; 4.5; 1.49 INJECTION, SOLUTION INTRAVENOUS at 03:30

## 2025-02-02 RX ADMIN — GUAIFENESIN AND DEXTROMETHORPHAN 5 ML: 100; 10 SYRUP ORAL at 20:29

## 2025-02-02 RX ADMIN — DEXTROSE MONOHYDRATE, SODIUM CHLORIDE, AND POTASSIUM CHLORIDE 100 ML/HR: 50; 4.5; 1.49 INJECTION, SOLUTION INTRAVENOUS at 14:31

## 2025-02-02 RX ADMIN — PANTOPRAZOLE SODIUM 40 MG: 40 INJECTION, POWDER, FOR SOLUTION INTRAVENOUS at 06:25

## 2025-02-02 RX ADMIN — DOCUSATE SODIUM 100 MG: 100 CAPSULE, LIQUID FILLED ORAL at 20:29

## 2025-02-02 RX ADMIN — ENOXAPARIN SODIUM 40 MG: 40 INJECTION SUBCUTANEOUS at 09:00

## 2025-02-02 RX ADMIN — DOCUSATE SODIUM 100 MG: 100 CAPSULE, LIQUID FILLED ORAL at 09:00

## 2025-02-02 RX ADMIN — BISOPROLOL FUMARATE 10 MG: 5 TABLET, FILM COATED ORAL at 20:29

## 2025-02-02 ASSESSMENT — COGNITIVE AND FUNCTIONAL STATUS - GENERAL
MOVING FROM LYING ON BACK TO SITTING ON SIDE OF FLAT BED WITH BEDRAILS: A LOT
HELP NEEDED FOR BATHING: A LOT
DAILY ACTIVITIY SCORE: 12
DRESSING REGULAR UPPER BODY CLOTHING: A LOT
EATING MEALS: A LOT
DRESSING REGULAR LOWER BODY CLOTHING: A LOT
TOILETING: A LOT
CLIMB 3 TO 5 STEPS WITH RAILING: TOTAL
MOBILITY SCORE: 9
TURNING FROM BACK TO SIDE WHILE IN FLAT BAD: A LOT
MOVING TO AND FROM BED TO CHAIR: A LOT
PERSONAL GROOMING: A LOT
WALKING IN HOSPITAL ROOM: TOTAL
STANDING UP FROM CHAIR USING ARMS: TOTAL

## 2025-02-02 ASSESSMENT — PAIN SCALES - GENERAL
PAINLEVEL_OUTOF10: 0 - NO PAIN
PAINLEVEL_OUTOF10: 0 - NO PAIN

## 2025-02-02 ASSESSMENT — ACTIVITIES OF DAILY LIVING (ADL): LACK_OF_TRANSPORTATION: NO

## 2025-02-02 NOTE — NURSING NOTE
Called [facility] to speak with Karlie. Left call back number so they can reach out.     Called them at this number #[phone]

## 2025-02-02 NOTE — PROGRESS NOTES
02/02/25 1601   Discharge Planning   Living Arrangements Spouse/significant other   Support Systems Spouse/significant other   Assistance Needed Spouses assist with all ADLS   Type of Residence Private residence   Do you have animals or pets at home? No   Who is requesting discharge planning? Provider   Expected Discharge Disposition [facility]   Does the patient need discharge transport arranged? Yes   RoundTrip coordination needed? Yes   Financial Resource Strain   How hard is it for you to pay for the very basics like food, housing, medical care, and heating? Pt Unable   Housing Stability   In the last 12 months, was there a time when you were not able to pay the mortgage or rent on time? N   In the past 12 months, how many times have you moved where you were living? 0   At any time in the past 12 months, were you homeless or living in a shelter (including now)? N   Transportation Needs   In the past 12 months, has lack of transportation kept you from medical appointments or from getting medications? no   In the past 12 months, has lack of transportation kept you from meetings, work, or from getting things needed for daily living? No   Patient Choice   Provider Choice list and CMS website (https://medicare.gov/care-compare#search) for post-acute Quality and Resource Measure Data were provided and reviewed with: Patient   Stroke Family Assessment   Stroke Family Assessment Needed No     Call placed to patients wife to discuss discharge planning.   Spoke with wife Wendy, who provided the following information.   Patient is a disoriented, bed bound, unable to ambulate/care for himself.   Patient was recently at rehab and returned home.  Edgardo confirmed she is no longer able to care for him at home.    She also confirms that he is unable to participate in therapy, so returning to rehab is not an option.   Wendy requests informational  meeting with [facility].     Hospice consult received    Referral sent to [facility], requested they reach out to spouse to schedule an information meeting

## 2025-02-02 NOTE — ASSESSMENT & PLAN NOTE
Continue current medication.  Supportive care.  The patient is on room air.  Blood pressure is controlled.  Heart rate is under control.  Saturating 94% room air.  Heart patient was tachycardic with is better now.  Patient had hypoglycemia.  Continue current medication pain.  The patient wanted to see her protein calorie malnutrition.  Monitor for now patient had anemia.  There is small drop in hemoglobin adequate.  That could be due to the IV fluid.  Monitor for now.  Patient also has thrombocytopenia on Promacta for now.  If platelet count continues dropped, then I will discontinue Lovenox.   input appreciated.  I will consult hospice.

## 2025-02-02 NOTE — CARE PLAN
The patient's goals for the shift include unabe to assess    The clinical goals for the shift include safety, IV fluids, reorient      Problem: Skin  Goal: Decreased wound size/increased tissue granulation at next dressing change  Outcome: Progressing  Flowsheets (Taken 2/1/2025 1225 by Skylar Moeller RN)  Decreased wound size/increased tissue granulation at next dressing change:   Protective dressings over bony prominences   Promote sleep for wound healing   Utilize specialty bed per algorithm  Goal: Participates in plan/prevention/treatment measures  Outcome: Progressing  Flowsheets (Taken 2/2/2025 0031)  Participates in plan/prevention/treatment measures: Elevate heels  Goal: Prevent/manage excess moisture  Outcome: Progressing  Flowsheets (Taken 2/2/2025 0031)  Prevent/manage excess moisture:   Cleanse incontinence/protect with barrier cream   Monitor for/manage infection if present  Goal: Prevent/minimize sheer/friction injuries  Outcome: Progressing  Flowsheets (Taken 2/2/2025 0031)  Prevent/minimize sheer/friction injuries:   Use pull sheet   HOB 30 degrees or less   Turn/reposition every 2 hours/use positioning/transfer devices  Goal: Promote/optimize nutrition  Outcome: Progressing  Flowsheets (Taken 2/2/2025 0031)  Promote/optimize nutrition: Offer water/supplements/favorite foods  Goal: Promote skin healing  Outcome: Progressing  Flowsheets (Taken 2/2/2025 0031)  Promote skin healing:   Protective dressings over bony prominences   Turn/reposition every 2 hours/use positioning/transfer devices     Problem: Fall/Injury  Goal: Not fall by end of shift  Outcome: Progressing  Goal: Be free from injury by end of the shift  Outcome: Progressing  Goal: Verbalize understanding of personal risk factors for fall in the hospital  Outcome: Progressing  Goal: Verbalize understanding of risk factor reduction measures to prevent injury from fall in the home  Outcome: Progressing  Goal: Use assistive devices by end of the  shift  Outcome: Progressing  Goal: Pace activities to prevent fatigue by end of the shift  Outcome: Progressing

## 2025-02-02 NOTE — PROGRESS NOTES
Elian Betancourt is a 79 y.o. male on day 2 of admission presenting with Altered mental status, unspecified altered mental status type.    Subjective   Patient was seen and examined feeling in the bed.  Comfortable.  Alic in [place].  No fever chills or rigor.  No cough or expectoration       Objective     Physical Exam  HEENT:  Head externally atraumatic,  extraocular movements intact, oral mucosa moist  Neck:  Supple, no JVP, no palpable adenopathy or thyromegaly.  No carotid bruit.  Chest:  Clear to auscultation and resonant.  Heart:  Regular rate and rhythm, no murmur or gallop could be appreciated.  Abdomen:  Soft, nontender, bowel sounds present, normoactive, no palpable hepatosplenomegaly.  Extremities: Patient is bedbound and contracted.    CNS:  Patient alert, oriented to time, place and person.    No new deficit.  Cranial nerves 2-12 grossly intact  Skin:  No active rash.  Musculoskeletal:  No  apparent joint swelling or erythema, range of movement normal.  Last Recorded Vitals  Heart Rate:  [108-109]   Temp:  [36.8 °C (98.2 °F)-37.1 °C (98.8 °F)]   Resp:  [19-22]   BP: (128-143)/(83-94)   SpO2:  [92 %-94 %]     Intake/Output last 3 Shifts:  I/O last 3 completed shifts:  In: 3256.7 (47.3 mL/kg) [P.O.:160; IV Piggyback:3096.7]  Out: - (0 mL/kg)   Weight: 68.8 kg     Relevant Results  No results found for the last 90 days.    No results found for this or any previous visit (from the past 24 hours).     Current Facility-Administered Medications:     acetaminophen (Tylenol) tablet 650 mg, 650 mg, oral, q4h PRN **OR** acetaminophen (Tylenol) oral liquid 650 mg, 650 mg, oral, q4h PRN **OR** acetaminophen (Tylenol) suppository 650 mg, 650 mg, rectal, q4h PRN, Deepali Goldman MD    acetaminophen (Tylenol) tablet 650 mg, 650 mg, oral, q4h PRN **OR** acetaminophen (Tylenol) oral liquid 650 mg, 650 mg, nasogastric tube, q4h PRN **OR** acetaminophen (Tylenol) suppository 650 mg, 650 mg, rectal, q4h PRN, Deepali Goldman MD     benzocaine-menthol (Cepastat Sore Throat) lozenge 1 lozenge, 1 lozenge, Mouth/Throat, q2h PRN, Deepali Goldman MD    dextromethorphan-guaifenesin (Robitussin DM)  mg/5 mL oral liquid 5 mL, 5 mL, oral, q4h PRN, Deepali Goldman MD    dextrose 5 % and sodium chloride 0.45 % with KCl 20 mEq/L infusion, 100 mL/hr, intravenous, Continuous, Deepali Goldman MD, Last Rate: 100 mL/hr at 02/02/25 1555, 100 mL/hr at 02/02/25 1555    docusate sodium (Colace) capsule 100 mg, 100 mg, oral, BID, Deepali Goldman MD, 100 mg at 02/02/25 0900    enoxaparin (Lovenox) syringe 40 mg, 40 mg, subcutaneous, Daily, Evin Harrington MD, 40 mg at 02/02/25 0900    guaiFENesin (Mucinex) 12 hr tablet 600 mg, 600 mg, oral, q12h PRN, Deepali Goldman MD    lisinopril tablet 10 mg, 10 mg, oral, Daily, Evin Harrington MD, 10 mg at 02/02/25 0900    melatonin tablet 3 mg, 3 mg, oral, Nightly PRN, Deepali Goldman MD    ondansetron ODT (Zofran-ODT) disintegrating tablet 4 mg, 4 mg, oral, q8h PRN **OR** ondansetron (Zofran) injection 4 mg, 4 mg, intravenous, q8h PRN, Deepali Goldman MD    pantoprazole (ProtoNix) EC tablet 40 mg, 40 mg, oral, Daily before breakfast **OR** pantoprazole (ProtoNix) injection 40 mg, 40 mg, intravenous, Daily before breakfast, Deepali Goldman MD, 40 mg at 02/02/25 0625    polyethylene glycol (Glycolax, Miralax) packet 17 g, 17 g, oral, Daily, Evin Harrington MD, 17 g at 02/02/25 0900   Assessment/Plan   Assessment & Plan  Altered mental status, unspecified altered mental status type    Dementia    Disability of walking    Benign prostatic hyperplasia    Hypertension    Pure hypercholesterolemia    Atrial fibrillation, unspecified type (Multi)    COVID-19    Dehydration  Continue current medication.  Supportive care.  The patient is on room air.  Blood pressure is controlled.  Heart rate is under control.  Saturating 94% room air.  Heart patient was tachycardic with is better now.  Patient had hypoglycemia.  Continue current medication pain.   The patient wanted to see her protein calorie malnutrition.  Monitor for now patient had anemia.  There is small drop in hemoglobin adequate.  That could be due to the IV fluid.  Monitor for now.  Patient also has thrombocytopenia on Promacta for now.  If platelet count continues dropped, then I will discontinue Lovenox.   input appreciated.  I will consult hospice.           Lexx Marques MD

## 2025-02-02 NOTE — CARE PLAN
The patient's goals for the shift include unabe to assess    The clinical goals for the shift include safety, IV fluids, precautions      Problem: Skin  Goal: Decreased wound size/increased tissue granulation at next dressing change  Outcome: Progressing  Goal: Participates in plan/prevention/treatment measures  Outcome: Progressing  Goal: Prevent/manage excess moisture  Outcome: Progressing  Goal: Prevent/minimize sheer/friction injuries  Outcome: Progressing  Goal: Promote/optimize nutrition  Outcome: Progressing  Goal: Promote skin healing  Outcome: Progressing     Problem: Fall/Injury  Goal: Not fall by end of shift  Outcome: Progressing  Goal: Be free from injury by end of the shift  Outcome: Progressing  Goal: Verbalize understanding of personal risk factors for fall in the hospital  Outcome: Progressing  Goal: Verbalize understanding of risk factor reduction measures to prevent injury from fall in the home  Outcome: Progressing  Goal: Use assistive devices by end of the shift  Outcome: Progressing  Goal: Pace activities to prevent fatigue by end of the shift  Outcome: Progressing

## 2025-02-02 NOTE — NURSING NOTE
"Event(s)/ Intervention Note  Patient name: Elian Betancourt   MRN: [id number]   Patient location: [facility]/448-A     The patient had the following problems: Abnormal VS  The event occurred at the following time:1757  The APRN/MD for the patient was contacted at:18:03. Notified Dr. Marques concerned about patients BP. Awaiting orders.     Most recent VS  BP (!) 163/109 (BP Location: Right arm, Patient Position: Lying) Comment: rn notified  Pulse (!) 111   Temp 36.9 °C (98.4 °F) (Temporal)   Resp 20   Ht 1.651 m (5' 5\")   Wt 68.8 kg (151 lb 10.8 oz)   SpO2 96%   BMI 25.24 kg/m²     "

## 2025-02-03 LAB
ANION GAP SERPL CALCULATED.3IONS-SCNC: 8 MMOL/L (ref 10–20)
ATRIAL RATE: 119 BPM
BASOPHILS # BLD AUTO: 0 X10*3/UL (ref 0–0.1)
BASOPHILS NFR BLD AUTO: 0 %
BUN SERPL-MCNC: 15 MG/DL (ref 6–23)
CALCIUM SERPL-MCNC: 7.8 MG/DL (ref 8.6–10.3)
CHLORIDE SERPL-SCNC: 109 MMOL/L (ref 98–107)
CO2 SERPL-SCNC: 26 MMOL/L (ref 21–32)
CREAT SERPL-MCNC: 0.65 MG/DL (ref 0.5–1.3)
EGFRCR SERPLBLD CKD-EPI 2021: >90 ML/MIN/1.73M*2
EOSINOPHIL # BLD AUTO: 0.01 X10*3/UL (ref 0–0.4)
EOSINOPHIL NFR BLD AUTO: 0.2 %
ERYTHROCYTE [DISTWIDTH] IN BLOOD BY AUTOMATED COUNT: 13.6 % (ref 11.5–14.5)
GLUCOSE SERPL-MCNC: 119 MG/DL (ref 74–99)
HCT VFR BLD AUTO: 31.8 % (ref 41–52)
HGB BLD-MCNC: 9.6 G/DL (ref 13.5–17.5)
IMM GRANULOCYTES # BLD AUTO: 0.02 X10*3/UL (ref 0–0.5)
IMM GRANULOCYTES NFR BLD AUTO: 0.4 % (ref 0–0.9)
LYMPHOCYTES # BLD AUTO: 0.36 X10*3/UL (ref 0.8–3)
LYMPHOCYTES NFR BLD AUTO: 6.7 %
MCH RBC QN AUTO: 27.3 PG (ref 26–34)
MCHC RBC AUTO-ENTMCNC: 30.2 G/DL (ref 32–36)
MCV RBC AUTO: 90 FL (ref 80–100)
MONOCYTES # BLD AUTO: 0.25 X10*3/UL (ref 0.05–0.8)
MONOCYTES NFR BLD AUTO: 4.6 %
NEUTROPHILS # BLD AUTO: 4.74 X10*3/UL (ref 1.6–5.5)
NEUTROPHILS NFR BLD AUTO: 88.1 %
NRBC BLD-RTO: 0 /100 WBCS (ref 0–0)
P OFFSET: 202 MS
P ONSET: 164 MS
PLATELET # BLD AUTO: 184 X10*3/UL (ref 150–450)
POTASSIUM SERPL-SCNC: 4.1 MMOL/L (ref 3.5–5.3)
PR INTERVAL: 80 MS
Q ONSET: 204 MS
QRS COUNT: 20 BEATS
QRS DURATION: 132 MS
QT INTERVAL: 370 MS
QTC CALCULATION(BAZETT): 520 MS
QTC FREDERICIA: 464 MS
R AXIS: -72 DEGREES
RBC # BLD AUTO: 3.52 X10*6/UL (ref 4.5–5.9)
SODIUM SERPL-SCNC: 139 MMOL/L (ref 136–145)
T AXIS: 90 DEGREES
T OFFSET: 389 MS
VENTRICULAR RATE: 119 BPM
WBC # BLD AUTO: 5.4 X10*3/UL (ref 4.4–11.3)

## 2025-02-03 PROCEDURE — 82374 ASSAY BLOOD CARBON DIOXIDE: CPT | Performed by: INTERNAL MEDICINE

## 2025-02-03 PROCEDURE — 2500000001 HC RX 250 WO HCPCS SELF ADMINISTERED DRUGS (ALT 637 FOR MEDICARE OP): Performed by: FAMILY MEDICINE

## 2025-02-03 PROCEDURE — 1100000001 HC PRIVATE ROOM DAILY

## 2025-02-03 PROCEDURE — 2500000004 HC RX 250 GENERAL PHARMACY W/ HCPCS (ALT 636 FOR OP/ED): Performed by: FAMILY MEDICINE

## 2025-02-03 PROCEDURE — 85025 COMPLETE CBC W/AUTO DIFF WBC: CPT | Performed by: INTERNAL MEDICINE

## 2025-02-03 PROCEDURE — 2500000001 HC RX 250 WO HCPCS SELF ADMINISTERED DRUGS (ALT 637 FOR MEDICARE OP)

## 2025-02-03 PROCEDURE — 2500000001 HC RX 250 WO HCPCS SELF ADMINISTERED DRUGS (ALT 637 FOR MEDICARE OP): Performed by: INTERNAL MEDICINE

## 2025-02-03 PROCEDURE — 2500000004 HC RX 250 GENERAL PHARMACY W/ HCPCS (ALT 636 FOR OP/ED): Performed by: INTERNAL MEDICINE

## 2025-02-03 PROCEDURE — 36415 COLL VENOUS BLD VENIPUNCTURE: CPT | Performed by: INTERNAL MEDICINE

## 2025-02-03 RX ORDER — BISOPROLOL FUMARATE 5 MG/1
10 TABLET, FILM COATED ORAL DAILY
Status: DISCONTINUED | OUTPATIENT
Start: 2025-02-03 | End: 2025-02-05 | Stop reason: HOSPADM

## 2025-02-03 RX ADMIN — GUAIFENESIN AND DEXTROMETHORPHAN 5 ML: 100; 10 SYRUP ORAL at 01:07

## 2025-02-03 RX ADMIN — ENOXAPARIN SODIUM 40 MG: 40 INJECTION SUBCUTANEOUS at 09:16

## 2025-02-03 RX ADMIN — BISOPROLOL FUMARATE 10 MG: 5 TABLET ORAL at 21:36

## 2025-02-03 RX ADMIN — DEXTROSE MONOHYDRATE, SODIUM CHLORIDE, AND POTASSIUM CHLORIDE 100 ML/HR: 50; 4.5; 1.49 INJECTION, SOLUTION INTRAVENOUS at 03:23

## 2025-02-03 RX ADMIN — PANTOPRAZOLE SODIUM 40 MG: 40 INJECTION, POWDER, FOR SOLUTION INTRAVENOUS at 06:19

## 2025-02-03 RX ADMIN — DOCUSATE SODIUM 100 MG: 100 CAPSULE, LIQUID FILLED ORAL at 09:16

## 2025-02-03 RX ADMIN — DOCUSATE SODIUM 100 MG: 100 CAPSULE, LIQUID FILLED ORAL at 21:36

## 2025-02-03 RX ADMIN — LISINOPRIL 10 MG: 10 TABLET ORAL at 09:16

## 2025-02-03 RX ADMIN — DEXTROSE MONOHYDRATE, SODIUM CHLORIDE, AND POTASSIUM CHLORIDE 100 ML/HR: 50; 4.5; 1.49 INJECTION, SOLUTION INTRAVENOUS at 14:18

## 2025-02-03 ASSESSMENT — COGNITIVE AND FUNCTIONAL STATUS - GENERAL
HELP NEEDED FOR BATHING: A LOT
MOVING TO AND FROM BED TO CHAIR: A LOT
DRESSING REGULAR UPPER BODY CLOTHING: A LOT
STANDING UP FROM CHAIR USING ARMS: A LOT
MOVING TO AND FROM BED TO CHAIR: A LOT
PERSONAL GROOMING: A LITTLE
DAILY ACTIVITIY SCORE: 14
MOBILITY SCORE: 12
MOVING FROM LYING ON BACK TO SITTING ON SIDE OF FLAT BED WITH BEDRAILS: A LITTLE
TOILETING: A LOT
HELP NEEDED FOR BATHING: A LOT
MOVING FROM LYING ON BACK TO SITTING ON SIDE OF FLAT BED WITH BEDRAILS: A LITTLE
MOBILITY SCORE: 12
STANDING UP FROM CHAIR USING ARMS: A LOT
WALKING IN HOSPITAL ROOM: TOTAL
TURNING FROM BACK TO SIDE WHILE IN FLAT BAD: A LITTLE
EATING MEALS: A LITTLE
DRESSING REGULAR LOWER BODY CLOTHING: A LOT
DAILY ACTIVITIY SCORE: 14
CLIMB 3 TO 5 STEPS WITH RAILING: TOTAL
TURNING FROM BACK TO SIDE WHILE IN FLAT BAD: A LITTLE
TOILETING: A LOT
PERSONAL GROOMING: A LITTLE
EATING MEALS: A LITTLE
DRESSING REGULAR UPPER BODY CLOTHING: A LOT
CLIMB 3 TO 5 STEPS WITH RAILING: TOTAL
DRESSING REGULAR LOWER BODY CLOTHING: A LOT
WALKING IN HOSPITAL ROOM: TOTAL

## 2025-02-03 ASSESSMENT — PAIN SCALES - GENERAL: PAINLEVEL_OUTOF10: 0 - NO PAIN

## 2025-02-03 NOTE — PROGRESS NOTES
02/03/25 0956   Discharge Planning   Expected Discharge Disposition [facility]   Does the patient need discharge transport arranged? Yes   RoundTrip coordination needed? Yes   Has discharge transport been arranged? No     Responded to hospice in careport.  Placed call to wife who picked up right away.. Told her hospice was trying to reach her and she hung up on them, per Hospice.   She states that's not true she has been waiting since yesterday for someone to call.  Call to hospice to call wife now.  Also added in careport.    Hospice meeting scheduled for 1:30 pm tomorrow 2/4/25

## 2025-02-03 NOTE — CONSULTS
Wound Care Consult     Visit Date: 2/3/2025      Patient Name: Elian Betancourt         MRN: [id number]           YOB: 1945    Consulted for wound care. A 79 y.o. male patient admitted for   Dehydration [E86.0]  Altered mental status, unspecified altered mental status type [R41.82]  Dementia, unspecified dementia severity, unspecified dementia type, unspecified whether behavioral, psychotic, or mood disturbance or anxiety (Multi) [F03.90]  COVID-19 virus infection [U07.1]   Past Medical History:   Diagnosis Date    Abdominal pain 03/08/2024    Agitation     Allergic rhinitis     Aortic dissection (Multi)     Arthritis     At risk for falling     Back pain     BPH (benign prostatic hyperplasia)     Cellulitis of left lower limb 04/26/2023    Chronic maxillary sinusitis 09/17/2023    Chronic pancreatitis (Multi)     Clavicle fracture     Closed fracture of acromial end of clavicle 04/09/2024    Contusion of face 03/08/2024    DDD (degenerative disc disease), lumbar     DDD (degenerative disc disease), thoracic     Dementia     Difficulty in walking, not elsewhere classified 04/26/2023    Dysfunction of eustachian tube 09/17/2023    Epistaxis 09/17/2023    Facial laceration 03/08/2024    Hiatal hernia     HTN (hypertension)     Hypercholesteremia     Hyperlipidemia     Injury of back 09/17/2023    Leg laceration     Leukopenia     Muscle weakness (generalized) 04/26/2023    Nasal discharge 09/17/2023    Osteoarthritis     Pain of left shoulder region 04/09/2024    Personal history of other specified conditions 06/23/2014    History of shortness of breath    Polyp of nasal cavity 03/08/2024    Repeated falls 04/26/2023    Rib fractures     Scoliosis 04/26/2023    Varicose veins of both legs with edema     Violent behavior     Weakness 04/25/2023      Past Surgical History:   Procedure Laterality Date    MYRINGOTOMY W/ TUBES  10/30/2013    Myringotomy - With Ventilating Tube Insertion    SEPTOPLASTY  09/16/2014     Septoplasty    TONSILLECTOMY  10/30/2013    Tonsillectomy With Adenoidectomy      Scheduled medications  bisoprolol, 10 mg, oral, Daily  docusate sodium, 100 mg, oral, BID  enoxaparin, 40 mg, subcutaneous, Daily  lisinopril, 10 mg, oral, Daily  pantoprazole, 40 mg, oral, Daily before breakfast   Or  pantoprazole, 40 mg, intravenous, Daily before breakfast  polyethylene glycol, 17 g, oral, Daily      Continuous medications  potassium rmorcfl-E2-7.45%NaCl, 100 mL/hr, Last Rate: 100 mL/hr (02/03/25 1418)      PRN medications  PRN medications: acetaminophen **OR** acetaminophen **OR** acetaminophen, acetaminophen **OR** acetaminophen **OR** acetaminophen, benzocaine-menthol, dextromethorphan-guaifenesin, guaiFENesin, melatonin, ondansetron ODT **OR** ondansetron     No Known Allergies     No results found for the last 90 days.         Pertinent Labs:   Albumin   Date Value Ref Range Status   02/01/2025 2.6 (L) 3.4 - 5.0 g/dL Final       Wound Assessment:  Wound 12/26/24 Pretibial Left (Active)   Wound Image    01/31/25 1813   Wound Length (cm) 2 cm 02/03/25 1423   Wound Width (cm) 0.2 cm 02/03/25 1423   Wound Surface Area (cm^2) 0.4 cm^2 02/03/25 1423   Wound Depth (cm) 0.2 cm 02/03/25 1423   Wound Volume (cm^3) 0.08 cm^3 02/03/25 1423   Margins Well-defined edges 02/03/25 1423   Drainage Description Sanguineous 02/03/25 1423   Drainage Amount Small 02/03/25 1423   Dressing Foam 02/03/25 1423   Dressing Changed New 02/02/25 1550   Dressing Status Dry;Clean;Occlusive 02/03/25 0900       Wound 12/26/24 Dorsal foot;Right (Active)       Wound 12/26/24 Dorsal foot;Left (Active)       Wound 01/31/25 Hip Dorsal;Left;Proximal;Upper (Active)   Wound Image    01/31/25 1815   Wound Length (cm) 2 cm 02/03/25 1423   Wound Width (cm) 6 cm 02/03/25 1423   Wound Surface Area (cm^2) 12 cm^2 02/03/25 1423   Wound Depth (cm) 0.2 cm 02/03/25 1423   Wound Volume (cm^3) 2.4 cm^3 02/03/25 1423   Margins Well-defined edges 02/03/25 1423    Drainage Description Serous 02/03/25 1423   Drainage Amount Scant 02/03/25 1423   Dressing Other (Comment) 02/03/25 1423   Dressing Changed Changed 02/03/25 1423   Dressing Status Clean;Dry;Occlusive 02/03/25 1423       Wound 02/02/25 Skin Tear Arm Distal;Dorsal;Lower;Right (Active)   Site Assessment Intact 02/03/25 0900   Dressing Status Other (Comment) 02/03/25 0900       Reason for Consult: wound evaluation and recommendations         Wound History: wounds are present on admission, photos by nursing, and patient is positive for COVID    Wound Team Assessment: Patient supine in bed, confused, and agreeable to exam. Isolation precautions maintained during exam. Bilateral calves have multiple dry intact scabs, most are liniar in nature and appear to be traumatic. Left calf has area that is open, see assessment above,  let calf has pinhole areas that is draining with use of sequential compression and mepilex had contained serosanguineous drainage bilaterally. Recommend daily dressing changes       Left lateral hip wound is superficial with red base, no odor, small amount of serosanguineous drainage, see assessment above.     Bilateral arms have bruising and wounds that are scabbed over. Wounds left open to air and are not open at time of this visit.     Heels are not red and intact with heel borders. Bilateral knee bruises and abrasions are noted.     Recommendations: Wash left hip wound and bilateral lower calf wounds with soap/water, rinse, pat dry apply mepilex changing daily.      Patient is on a [facility] bed frame with [employer] Mattress with Waffle mattress. Rosey Galeana RN bedside nurse updated, continue pressure injury prevention interventions and nursing to continue to follow provider orders. Re consult wound RN PRN.    Ivette PABLON RN [facility] O[facility]  2/3/2025  2:32 PM

## 2025-02-03 NOTE — CARE PLAN
The patient's goals for the shift include unabe to assess    The clinical goals for the shift include safety, IV fluids, reorient, BP management      Problem: Skin  Goal: Decreased wound size/increased tissue granulation at next dressing change  Outcome: Progressing  Flowsheets (Taken 2/2/2025 2300)  Decreased wound size/increased tissue granulation at next dressing change: Protective dressings over bony prominences  Goal: Participates in plan/prevention/treatment measures  Outcome: Progressing  Flowsheets (Taken 2/2/2025 2300)  Participates in plan/prevention/treatment measures: Elevate heels  Goal: Prevent/manage excess moisture  Outcome: Progressing  Flowsheets (Taken 2/2/2025 2300)  Prevent/manage excess moisture:   Follow provider orders for dressing changes   Cleanse incontinence/protect with barrier cream   Monitor for/manage infection if present  Goal: Prevent/minimize sheer/friction injuries  Outcome: Progressing  Flowsheets (Taken 2/2/2025 2300)  Prevent/minimize sheer/friction injuries:   Use pull sheet   Turn/reposition every 2 hours/use positioning/transfer devices  Goal: Promote/optimize nutrition  Outcome: Progressing  Flowsheets (Taken 2/2/2025 2300)  Promote/optimize nutrition:   Consume > 50% meals/supplements   Monitor/record intake including meals  Goal: Promote skin healing  Outcome: Progressing  Flowsheets (Taken 2/2/2025 2300)  Promote skin healing:   Turn/reposition every 2 hours/use positioning/transfer devices   Protective dressings over bony prominences     Problem: Fall/Injury  Goal: Not fall by end of shift  Outcome: Progressing  Goal: Be free from injury by end of the shift  Outcome: Progressing  Goal: Verbalize understanding of personal risk factors for fall in the hospital  Outcome: Progressing  Goal: Verbalize understanding of risk factor reduction measures to prevent injury from fall in the home  Outcome: Progressing  Goal: Use assistive devices by end of the shift  Outcome:  Progressing  Goal: Pace activities to prevent fatigue by end of the shift  Outcome: Progressing       Problem: Chronic Conditions and Co-morbidities  Goal: Patient's chronic conditions and co-morbidity symptoms are monitored and maintained or improved  Outcome: Progressing     Problem: Safety - Adult  Goal: Free from fall injury  Outcome: Progressing     Problem: Nutrition  Goal: Nutrient intake appropriate for maintaining nutritional needs  Outcome: Progressing

## 2025-02-03 NOTE — PROGRESS NOTES
Therapy Communication Note    Patient Name: Elian Betancourt  MRN: [id number]  Today's Date: 2/3/2025    Discipline: Physical Therapy    Missed Visit Reason:      Missed Time: Cancel    Comment: Pending hospice meeting at this time. Hold PT follow up for now

## 2025-02-03 NOTE — NURSING NOTE
Bilateral lower extremity wound care, washed and dried. Foam mepilex applied to both legs, Patient tolerated well.

## 2025-02-03 NOTE — ASSESSMENT & PLAN NOTE
Continue current medication.  Supportive care.  The patient is on room air.  Blood pressure is controlled.  Heart rate is under control.  Saturating 94% room air.  Heart patient was tachycardic with is better now.  Patient had hypoglycemia.  Continue current medication pain.  The patient wanted to see her protein calorie malnutrition.  Monitor for now patient had anemia.  There is small drop in hemoglobin adequate.  That could be due to the IV fluid.  Monitor for now.  Patient also has thrombocytopenia on Promacta for now.  If platelet count continues dropped, then I will discontinue Lovenox.   input appreciated.  I will consult hospice.    2/3/2025    Continue current medication.  Supportive care.  CBC and CMP are stable.  Continue the COVID-19 infection.  Patient has hypertension and tachycardia yesterday..  From the bisoprolol.  Heart rate is controlled.  Blood pressure still slightly on the higher side but controlled.  Patient got anemia but hemoglobin hematocrit are stable.  Family is planning to sign up with hospice tomorrow.  We will take DVT, fall, aspiration, decubitus, DVT precaution.  This has been discussed with the patient and is agreeable to it.

## 2025-02-03 NOTE — PROGRESS NOTES
Elian Betancourt is a 79 y.o. male on day 3 of admission presenting with Altered mental status, unspecified altered mental status type.    Subjective   The patient was seen and examined.  He is lying in the bed comfortably.  He is very confused.     Objective     Physical Exam  HEENT:  Head externally atraumatic,  extraocular movements intact, oral mucosa moist  Neck:  Supple, no JVP, no palpable adenopathy or thyromegaly.  No carotid bruit.  Chest:  Clear to auscultation and resonant.  Heart:  Regular rate and rhythm, no murmur or gallop could be appreciated.  Abdomen:  Soft, nontender, bowel sounds present, normoactive, no palpable hepatosplenomegaly.  Extremities:  No edema, pulses present, no cyanosis or clubbing.  CNS:  Patient alert, oriented x 1, no focal deficit.  Musculoskeletal:  No  apparent joint swelling or erythema, range of movement normal.  Last Recorded Vitals  Heart Rate:  []   Temp:  [36.4 °C (97.5 °F)-36.9 °C (98.4 °F)]   Resp:  [20-26]   BP: (116-163)/()   SpO2:  [93 %-97 %]     Intake/Output last 3 Shifts:  I/O last 3 completed shifts:  In: 6601.7 (96 mL/kg) [P.O.:860; IV Piggyback:5741.7]  Out: 350 (5.1 mL/kg) [Urine:350 (0.1 mL/kg/hr)]  Weight: 68.8 kg     Relevant Results  No results found for the last 90 days.    Results for orders placed or performed during the hospital encounter of 01/31/25 (from the past 24 hours)   ECG 12 lead   Result Value Ref Range    Ventricular Rate 119 BPM    Atrial Rate 119 BPM    AR Interval 80 ms    QRS Duration 132 ms    QT Interval 370 ms    QTC Calculation(Bazett) 520 ms    R Axis -72 degrees    T Axis 90 degrees    QRS Count 20 beats    Q Onset 204 ms    P Onset 164 ms    P Offset 202 ms    T Offset 389 ms    QTC Fredericia 464 ms   CBC and Auto Differential   Result Value Ref Range    WBC 5.4 4.4 - 11.3 x10*3/uL    nRBC 0.0 0.0 - 0.0 /100 WBCs    RBC 3.52 (L) 4.50 - 5.90 x10*6/uL    Hemoglobin 9.6 (L) 13.5 - 17.5 g/dL    Hematocrit 31.8 (L) 41.0 -  52.0 %    MCV 90 80 - 100 fL    MCH 27.3 26.0 - 34.0 pg    MCHC 30.2 (L) 32.0 - 36.0 g/dL    RDW 13.6 11.5 - 14.5 %    Platelets 184 150 - 450 x10*3/uL    Neutrophils % 88.1 40.0 - 80.0 %    Immature Granulocytes %, Automated 0.4 0.0 - 0.9 %    Lymphocytes % 6.7 13.0 - 44.0 %    Monocytes % 4.6 2.0 - 10.0 %    Eosinophils % 0.2 0.0 - 6.0 %    Basophils % 0.0 0.0 - 2.0 %    Neutrophils Absolute 4.74 1.60 - 5.50 x10*3/uL    Immature Granulocytes Absolute, Automated 0.02 0.00 - 0.50 x10*3/uL    Lymphocytes Absolute 0.36 (L) 0.80 - 3.00 x10*3/uL    Monocytes Absolute 0.25 0.05 - 0.80 x10*3/uL    Eosinophils Absolute 0.01 0.00 - 0.40 x10*3/uL    Basophils Absolute 0.00 0.00 - 0.10 x10*3/uL   Basic Metabolic Panel   Result Value Ref Range    Glucose 119 (H) 74 - 99 mg/dL    Sodium 139 136 - 145 mmol/L    Potassium 4.1 3.5 - 5.3 mmol/L    Chloride 109 (H) 98 - 107 mmol/L    Bicarbonate 26 21 - 32 mmol/L    Anion Gap 8 (L) 10 - 20 mmol/L    Urea Nitrogen 15 6 - 23 mg/dL    Creatinine 0.65 0.50 - 1.30 mg/dL    eGFR >90 >60 mL/min/1.73m*2    Calcium 7.8 (L) 8.6 - 10.3 mg/dL        Current Facility-Administered Medications:     acetaminophen (Tylenol) tablet 650 mg, 650 mg, oral, q4h PRN **OR** acetaminophen (Tylenol) oral liquid 650 mg, 650 mg, oral, q4h PRN **OR** acetaminophen (Tylenol) suppository 650 mg, 650 mg, rectal, q4h PRN, Deepali Goldman MD    acetaminophen (Tylenol) tablet 650 mg, 650 mg, oral, q4h PRN **OR** acetaminophen (Tylenol) oral liquid 650 mg, 650 mg, nasogastric tube, q4h PRN **OR** acetaminophen (Tylenol) suppository 650 mg, 650 mg, rectal, q4h PRN, Deepali Goldman MD    benzocaine-menthol (Cepastat Sore Throat) lozenge 1 lozenge, 1 lozenge, Mouth/Throat, q2h PRN, Deepali Goldman MD    bisoprolol (Zebeta) tablet 10 mg, 10 mg, oral, Daily, Lexx Marques MD, 10 mg at 02/02/25 2029    dextromethorphan-guaifenesin (Robitussin DM)  mg/5 mL oral liquid 5 mL, 5 mL, oral, q4h PRN, Deepali Goldman MD, 5 mL at  02/03/25 0107    dextrose 5 % and sodium chloride 0.45 % with KCl 20 mEq/L infusion, 100 mL/hr, intravenous, Continuous, Deepali Goldman MD, Last Rate: 100 mL/hr at 02/03/25 1545, 100 mL/hr at 02/03/25 1545    docusate sodium (Colace) capsule 100 mg, 100 mg, oral, BID, Deepali Goldman MD, 100 mg at 02/03/25 0916    enoxaparin (Lovenox) syringe 40 mg, 40 mg, subcutaneous, Daily, Evin Harrington MD, 40 mg at 02/03/25 0916    guaiFENesin (Mucinex) 12 hr tablet 600 mg, 600 mg, oral, q12h PRN, Deepali Goldman MD    lisinopril tablet 10 mg, 10 mg, oral, Daily, Evin Harrington MD, 10 mg at 02/03/25 0916    melatonin tablet 3 mg, 3 mg, oral, Nightly PRN, Deepali Goldman MD    ondansetron ODT (Zofran-ODT) disintegrating tablet 4 mg, 4 mg, oral, q8h PRN **OR** ondansetron (Zofran) injection 4 mg, 4 mg, intravenous, q8h PRN, Deepali Goldman MD    pantoprazole (ProtoNix) EC tablet 40 mg, 40 mg, oral, Daily before breakfast **OR** pantoprazole (ProtoNix) injection 40 mg, 40 mg, intravenous, Daily before breakfast, Deepali Goldman MD, 40 mg at 02/03/25 0619    polyethylene glycol (Glycolax, Miralax) packet 17 g, 17 g, oral, Daily, Evin Harrington MD, 17 g at 02/02/25 0900   Assessment/Plan   Assessment & Plan  Altered mental status, unspecified altered mental status type    Dementia    Disability of walking    Benign prostatic hyperplasia    Hypertension    Pure hypercholesterolemia    Atrial fibrillation, unspecified type (Multi)    COVID-19    Dehydration  Continue current medication.  Supportive care.  The patient is on room air.  Blood pressure is controlled.  Heart rate is under control.  Saturating 94% room air.  Heart patient was tachycardic with is better now.  Patient had hypoglycemia.  Continue current medication pain.  The patient wanted to see her protein calorie malnutrition.  Monitor for now patient had anemia.  There is small drop in hemoglobin adequate.  That could be due to the IV fluid.  Monitor for now.  Patient also has  thrombocytopenia on Promacta for now.  If platelet count continues dropped, then I will discontinue Lovenox.   input appreciated.  I will consult hospice.    2/3/2025    Continue current medication.  Supportive care.  CBC and CMP are stable.  Continue the COVID-19 infection.  Patient has hypertension and tachycardia yesterday..  From the bisoprolol.  Heart rate is controlled.  Blood pressure still slightly on the higher side but controlled.  Patient got anemia but hemoglobin hematocrit are stable.  Family is planning to sign up with hospice tomorrow.  We will take DVT, fall, aspiration, decubitus, DVT precaution.  This has been discussed with the patient and is agreeable to it.           Lexx Marques MD

## 2025-02-03 NOTE — CARE PLAN
The patient's goals for the shift include unabe to assess    The clinical goals for the shift include safety, monitor VS      Problem: Skin  Goal: Decreased wound size/increased tissue granulation at next dressing change  Outcome: Progressing  Goal: Participates in plan/prevention/treatment measures  Outcome: Progressing  Goal: Prevent/manage excess moisture  Outcome: Progressing  Goal: Prevent/minimize sheer/friction injuries  Outcome: Progressing  Goal: Promote/optimize nutrition  Outcome: Progressing  Goal: Promote skin healing  Outcome: Progressing     Problem: Fall/Injury  Goal: Not fall by end of shift  Outcome: Progressing  Goal: Be free from injury by end of the shift  Outcome: Progressing  Goal: Verbalize understanding of personal risk factors for fall in the hospital  Outcome: Progressing  Goal: Verbalize understanding of risk factor reduction measures to prevent injury from fall in the home  Outcome: Progressing  Goal: Use assistive devices by end of the shift  Outcome: Progressing  Goal: Pace activities to prevent fatigue by end of the shift  Outcome: Progressing     Problem: Pain - Adult  Goal: Verbalizes/displays adequate comfort level or baseline comfort level  Outcome: Progressing     Problem: Safety - Adult  Goal: Free from fall injury  Outcome: Progressing     Problem: Discharge Planning  Goal: Discharge to home or other facility with appropriate resources  Outcome: Progressing     Problem: Chronic Conditions and Co-morbidities  Goal: Patient's chronic conditions and co-morbidity symptoms are monitored and maintained or improved  Outcome: Progressing     Problem: Nutrition  Goal: Nutrient intake appropriate for maintaining nutritional needs  Outcome: Progressing

## 2025-02-04 PROCEDURE — 1100000001 HC PRIVATE ROOM DAILY

## 2025-02-04 PROCEDURE — 2500000001 HC RX 250 WO HCPCS SELF ADMINISTERED DRUGS (ALT 637 FOR MEDICARE OP): Performed by: FAMILY MEDICINE

## 2025-02-04 PROCEDURE — 2500000004 HC RX 250 GENERAL PHARMACY W/ HCPCS (ALT 636 FOR OP/ED): Performed by: FAMILY MEDICINE

## 2025-02-04 PROCEDURE — 2500000005 HC RX 250 GENERAL PHARMACY W/O HCPCS: Performed by: INTERNAL MEDICINE

## 2025-02-04 PROCEDURE — 2500000001 HC RX 250 WO HCPCS SELF ADMINISTERED DRUGS (ALT 637 FOR MEDICARE OP): Performed by: INTERNAL MEDICINE

## 2025-02-04 PROCEDURE — 2500000004 HC RX 250 GENERAL PHARMACY W/ HCPCS (ALT 636 FOR OP/ED): Performed by: INTERNAL MEDICINE

## 2025-02-04 PROCEDURE — 2500000001 HC RX 250 WO HCPCS SELF ADMINISTERED DRUGS (ALT 637 FOR MEDICARE OP)

## 2025-02-04 RX ADMIN — DOCUSATE SODIUM 100 MG: 100 CAPSULE, LIQUID FILLED ORAL at 10:22

## 2025-02-04 RX ADMIN — LISINOPRIL 10 MG: 10 TABLET ORAL at 10:22

## 2025-02-04 RX ADMIN — Medication 3 MG: at 21:33

## 2025-02-04 RX ADMIN — ENOXAPARIN SODIUM 40 MG: 40 INJECTION SUBCUTANEOUS at 10:22

## 2025-02-04 RX ADMIN — PANTOPRAZOLE SODIUM 40 MG: 40 TABLET, DELAYED RELEASE ORAL at 06:11

## 2025-02-04 RX ADMIN — DEXTROSE MONOHYDRATE, SODIUM CHLORIDE, AND POTASSIUM CHLORIDE 100 ML/HR: 50; 4.5; 1.49 INJECTION, SOLUTION INTRAVENOUS at 00:28

## 2025-02-04 RX ADMIN — BISOPROLOL FUMARATE 10 MG: 5 TABLET ORAL at 21:33

## 2025-02-04 RX ADMIN — POLYETHYLENE GLYCOL 3350 17 G: 17 POWDER, FOR SOLUTION ORAL at 10:22

## 2025-02-04 ASSESSMENT — COGNITIVE AND FUNCTIONAL STATUS - GENERAL
DAILY ACTIVITIY SCORE: 10
TOILETING: TOTAL
DRESSING REGULAR UPPER BODY CLOTHING: TOTAL
PERSONAL GROOMING: A LITTLE
STANDING UP FROM CHAIR USING ARMS: A LOT
CLIMB 3 TO 5 STEPS WITH RAILING: TOTAL
MOVING TO AND FROM BED TO CHAIR: A LOT
EATING MEALS: A LITTLE
MOVING FROM LYING ON BACK TO SITTING ON SIDE OF FLAT BED WITH BEDRAILS: A LITTLE
MOVING TO AND FROM BED TO CHAIR: A LOT
HELP NEEDED FOR BATHING: TOTAL
DRESSING REGULAR LOWER BODY CLOTHING: TOTAL
DRESSING REGULAR UPPER BODY CLOTHING: TOTAL
TOILETING: TOTAL
TURNING FROM BACK TO SIDE WHILE IN FLAT BAD: A LITTLE
EATING MEALS: A LITTLE
DRESSING REGULAR LOWER BODY CLOTHING: TOTAL
TURNING FROM BACK TO SIDE WHILE IN FLAT BAD: A LITTLE
DAILY ACTIVITIY SCORE: 10
HELP NEEDED FOR BATHING: TOTAL
MOVING FROM LYING ON BACK TO SITTING ON SIDE OF FLAT BED WITH BEDRAILS: A LITTLE
MOBILITY SCORE: 12
PERSONAL GROOMING: A LITTLE
WALKING IN HOSPITAL ROOM: TOTAL
STANDING UP FROM CHAIR USING ARMS: A LOT
CLIMB 3 TO 5 STEPS WITH RAILING: TOTAL

## 2025-02-04 ASSESSMENT — PAIN SCALES - GENERAL
PAINLEVEL_OUTOF10: 0 - NO PAIN

## 2025-02-04 NOTE — NURSING NOTE
Patient ripped out IV, Jerald Ruggiero notified. Dr. Marques is aware of no IV access and is okay with that.

## 2025-02-04 NOTE — CARE PLAN
The patient's goals for the shift include unabe to assess    The clinical goals for the shift include IV fluids, safety, hospice meeting      Problem: Skin  Goal: Decreased wound size/increased tissue granulation at next dressing change  Outcome: Progressing  Goal: Participates in plan/prevention/treatment measures  Outcome: Progressing  Goal: Prevent/manage excess moisture  Outcome: Progressing  Goal: Prevent/minimize sheer/friction injuries  Outcome: Progressing  Goal: Promote/optimize nutrition  Outcome: Progressing  Goal: Promote skin healing  Outcome: Progressing     Problem: Fall/Injury  Goal: Not fall by end of shift  Outcome: Progressing  Goal: Be free from injury by end of the shift  Outcome: Progressing  Goal: Verbalize understanding of personal risk factors for fall in the hospital  Outcome: Progressing  Goal: Verbalize understanding of risk factor reduction measures to prevent injury from fall in the home  Outcome: Progressing  Goal: Use assistive devices by end of the shift  Outcome: Progressing  Goal: Pace activities to prevent fatigue by end of the shift  Outcome: Progressing     Problem: Pain - Adult  Goal: Verbalizes/displays adequate comfort level or baseline comfort level  Outcome: Progressing     Problem: Safety - Adult  Goal: Free from fall injury  Outcome: Progressing     Problem: Discharge Planning  Goal: Discharge to home or other facility with appropriate resources  Outcome: Progressing     Problem: Chronic Conditions and Co-morbidities  Goal: Patient's chronic conditions and co-morbidity symptoms are monitored and maintained or improved  Outcome: Progressing     Problem: Nutrition  Goal: Nutrient intake appropriate for maintaining nutritional needs  Outcome: Progressing

## 2025-02-04 NOTE — CARE PLAN
The patient's goals for the shift include unabe to assess    The clinical goals for the shift include IV fluids, remain free from falls      Problem: Skin  Goal: Decreased wound size/increased tissue granulation at next dressing change  Outcome: Progressing  Flowsheets (Taken 2/3/2025 2012)  Decreased wound size/increased tissue granulation at next dressing change: Protective dressings over bony prominences  Goal: Participates in plan/prevention/treatment measures  Outcome: Progressing  Flowsheets (Taken 2/3/2025 2012)  Participates in plan/prevention/treatment measures: Elevate heels  Goal: Prevent/manage excess moisture  Outcome: Progressing  Flowsheets (Taken 2/3/2025 2012)  Prevent/manage excess moisture: Cleanse incontinence/protect with barrier cream  Goal: Prevent/minimize sheer/friction injuries  Outcome: Progressing  Flowsheets (Taken 2/3/2025 2012)  Prevent/minimize sheer/friction injuries:   Use pull sheet   HOB 30 degrees or less   Turn/reposition every 2 hours/use positioning/transfer devices  Goal: Promote/optimize nutrition  Outcome: Progressing  Flowsheets (Taken 2/3/2025 2012)  Promote/optimize nutrition:   Consume > 50% meals/supplements   Monitor/record intake including meals  Goal: Promote skin healing  Outcome: Progressing  Flowsheets (Taken 2/3/2025 2012)  Promote skin healing:   Turn/reposition every 2 hours/use positioning/transfer devices   Protective dressings over bony prominences     Problem: Fall/Injury  Goal: Not fall by end of shift  Outcome: Progressing  Goal: Be free from injury by end of the shift  Outcome: Progressing  Goal: Verbalize understanding of personal risk factors for fall in the hospital  Outcome: Progressing  Goal: Verbalize understanding of risk factor reduction measures to prevent injury from fall in the home  Outcome: Progressing  Goal: Use assistive devices by end of the shift  Outcome: Progressing  Goal: Pace activities to prevent fatigue by end of the  shift  Outcome: Progressing     Problem: Safety - Adult  Goal: Free from fall injury  Outcome: Progressing     Problem: Discharge Planning  Goal: Discharge to home or other facility with appropriate resources  Outcome: Progressing     Problem: Chronic Conditions and Co-morbidities  Goal: Patient's chronic conditions and co-morbidity symptoms are monitored and maintained or improved  Outcome: Progressing     Problem: Nutrition  Goal: Nutrient intake appropriate for maintaining nutritional needs  Outcome: Progressing

## 2025-02-05 VITALS
HEART RATE: 57 BPM | DIASTOLIC BLOOD PRESSURE: 86 MMHG | WEIGHT: 151.68 LBS | HEIGHT: 65 IN | SYSTOLIC BLOOD PRESSURE: 140 MMHG | RESPIRATION RATE: 22 BRPM | OXYGEN SATURATION: 94 % | BODY MASS INDEX: 25.27 KG/M2 | TEMPERATURE: 97.5 F

## 2025-02-05 PROCEDURE — 2500000001 HC RX 250 WO HCPCS SELF ADMINISTERED DRUGS (ALT 637 FOR MEDICARE OP): Performed by: INTERNAL MEDICINE

## 2025-02-05 PROCEDURE — 2500000001 HC RX 250 WO HCPCS SELF ADMINISTERED DRUGS (ALT 637 FOR MEDICARE OP): Performed by: FAMILY MEDICINE

## 2025-02-05 PROCEDURE — 2500000004 HC RX 250 GENERAL PHARMACY W/ HCPCS (ALT 636 FOR OP/ED): Performed by: FAMILY MEDICINE

## 2025-02-05 RX ORDER — DOCUSATE SODIUM 100 MG/1
100 CAPSULE, LIQUID FILLED ORAL 2 TIMES DAILY
Qty: 60 CAPSULE | Refills: 0 | Status: SHIPPED | OUTPATIENT
Start: 2025-02-05 | End: 2025-03-07

## 2025-02-05 RX ORDER — ONDANSETRON 4 MG/1
4 TABLET, ORALLY DISINTEGRATING ORAL EVERY 8 HOURS PRN
Qty: 20 TABLET | Refills: 0 | Status: SHIPPED | OUTPATIENT
Start: 2025-02-05

## 2025-02-05 RX ORDER — ACETAMINOPHEN 325 MG/1
650 TABLET ORAL EVERY 4 HOURS PRN
Qty: 30 TABLET | Refills: 0 | Status: SHIPPED | OUTPATIENT
Start: 2025-02-05

## 2025-02-05 RX ORDER — PANTOPRAZOLE SODIUM 40 MG/1
40 TABLET, DELAYED RELEASE ORAL
Start: 2025-02-06

## 2025-02-05 RX ORDER — BISOPROLOL FUMARATE 10 MG/1
10 TABLET, FILM COATED ORAL DAILY
Start: 2025-02-05

## 2025-02-05 RX ADMIN — PANTOPRAZOLE SODIUM 40 MG: 40 TABLET, DELAYED RELEASE ORAL at 06:04

## 2025-02-05 RX ADMIN — ENOXAPARIN SODIUM 40 MG: 40 INJECTION SUBCUTANEOUS at 09:24

## 2025-02-05 RX ADMIN — LISINOPRIL 10 MG: 10 TABLET ORAL at 09:24

## 2025-02-05 NOTE — ASSESSMENT & PLAN NOTE
Continue current medication.  Supportive care.  The patient is on room air.  Blood pressure is controlled.  Heart rate is under control.  Saturating 94% room air.  Heart patient was tachycardic with is better now.  Patient had hypoglycemia.  Continue current medication pain.  The patient wanted to see her protein calorie malnutrition.  Monitor for now patient had anemia.  There is small drop in hemoglobin adequate.  That could be due to the IV fluid.  Monitor for now.  Patient also has thrombocytopenia on Promacta for now.  If platelet count continues dropped, then I will discontinue Lovenox.   input appreciated.  I will consult hospice.    2/3/2025    Continue current medication.  Supportive care.  CBC and CMP are stable.  Continue the COVID-19 infection.  Patient has hypertension and tachycardia yesterday..  From the bisoprolol.  Heart rate is controlled.  Blood pressure still slightly on the higher side but controlled.  Patient got anemia but hemoglobin hematocrit are stable.  Family is planning to sign up with hospice tomorrow.  We will take DVT, fall, aspiration, decubitus, DVT precaution.  This has been discussed with the patient and is agreeable to I    2/4/2025  Plan: Continue current medication.  Supportive care.  The patient is doing much better now.  The patient is off oxygen.  Patient medically stable.  Blood pressure stable.  Heart rate is under control.  The patient can be discharged when arrangements are made.    2/5/2025    Plan continue current medication.  Patient medically stable.  Patient is being discharged to a extended-care facility with hospice care.

## 2025-02-05 NOTE — PROGRESS NOTES
Elian Betancourt is a 79 y.o. male on day 4 of admission presenting with Altered mental status, unspecified altered mental status type.    Subjective   The patient was seen and examined.  Lying in the bed.  Comfortable.  Did not look in acute distress.  The patient denied any headache, dizziness, nausea or vomiting.  The patient is more alert now.       Objective     Physical Exam  HEENT:  Head externally atraumatic,  extraocular movements intact, oral mucosa moist  Neck:  Supple, no JVP, no palpable adenopathy or thyromegaly.  No carotid bruit.  Chest:  Clear to auscultation and resonant.  Heart:  Regular rate and rhythm, no murmur or gallop could be appreciated.  Abdomen:  Soft, nontender, bowel sounds present, normoactive, no palpable hepatosplenomegaly.  Extremities:  No edema, pulses present, no cyanosis or clubbing.  CNS:  Patient alert, oriented to time, place and person.    No new deficit.  Cranial nerves 2-12 grossly intact  Skin:  No active rash.  Musculoskeletal:  No  apparent joint swelling or erythema, range of movement normal.  Last Recorded Vitals  Heart Rate:  []   Temp:  [36.6 °C (97.9 °F)-36.8 °C (98.2 °F)]   Resp:  [20-22]   BP: (130-149)/(63-79)   SpO2:  [94 %-95 %]     Intake/Output last 3 Shifts:  I/O last 3 completed shifts:  In: 3618.3 (52.6 mL/kg) [P.O.:700; IV Piggyback:2918.3]  Out: - (0 mL/kg)   Weight: 68.8 kg     Relevant Results  No results found for the last 90 days.    No results found for this or any previous visit (from the past 24 hours).     Current Facility-Administered Medications:     acetaminophen (Tylenol) tablet 650 mg, 650 mg, oral, q4h PRN **OR** acetaminophen (Tylenol) oral liquid 650 mg, 650 mg, oral, q4h PRN **OR** acetaminophen (Tylenol) suppository 650 mg, 650 mg, rectal, q4h PRN, Deepali Goldman MD    acetaminophen (Tylenol) tablet 650 mg, 650 mg, oral, q4h PRN **OR** acetaminophen (Tylenol) oral liquid 650 mg, 650 mg, nasogastric tube, q4h PRN **OR** acetaminophen  (Tylenol) suppository 650 mg, 650 mg, rectal, q4h PRN, Deepali Goldman MD    benzocaine-menthol (Cepastat Sore Throat) lozenge 1 lozenge, 1 lozenge, Mouth/Throat, q2h PRN, Deepali Goldman MD    bisoprolol (Zebeta) tablet 10 mg, 10 mg, oral, Daily, Joao Cantu, [facility], 10 mg at 02/03/25 2136    dextromethorphan-guaifenesin (Robitussin DM)  mg/5 mL oral liquid 5 mL, 5 mL, oral, q4h PRN, Deepali Goldman MD, 5 mL at 02/03/25 0107    dextrose 5 % and sodium chloride 0.45 % with KCl 20 mEq/L infusion, 100 mL/hr, intravenous, Continuous, Deepali Goldman MD, Stopped at 02/04/25 1133    docusate sodium (Colace) capsule 100 mg, 100 mg, oral, BID, Deepali Goldman MD, 100 mg at 02/04/25 1022    enoxaparin (Lovenox) syringe 40 mg, 40 mg, subcutaneous, Daily, Evin Harrington MD, 40 mg at 02/04/25 1022    guaiFENesin (Mucinex) 12 hr tablet 600 mg, 600 mg, oral, q12h PRN, Deepali Goldman MD    lisinopril tablet 10 mg, 10 mg, oral, Daily, Evin Harrington MD, 10 mg at 02/04/25 1022    melatonin tablet 3 mg, 3 mg, oral, Nightly PRN, Deepali Goldman MD    ondansetron ODT (Zofran-ODT) disintegrating tablet 4 mg, 4 mg, oral, q8h PRN **OR** ondansetron (Zofran) injection 4 mg, 4 mg, intravenous, q8h PRN, Deepali Goldman MD    pantoprazole (ProtoNix) EC tablet 40 mg, 40 mg, oral, Daily before breakfast, 40 mg at 02/04/25 0611 **OR** pantoprazole (ProtoNix) injection 40 mg, 40 mg, intravenous, Daily before breakfast, Deepali Goldman MD, 40 mg at 02/03/25 0619    polyethylene glycol (Glycolax, Miralax) packet 17 g, 17 g, oral, Daily, Evin Harrington MD, 17 g at 02/04/25 1022   Assessment/Plan   Assessment & Plan  Altered mental status, unspecified altered mental status type    Dementia    Disability of walking    Benign prostatic hyperplasia    Hypertension    Pure hypercholesterolemia    Atrial fibrillation, unspecified type (Multi)    COVID-19    Dehydration  Continue current medication.  Supportive care.  The patient is on room air.  Blood pressure is  controlled.  Heart rate is under control.  Saturating 94% room air.  Heart patient was tachycardic with is better now.  Patient had hypoglycemia.  Continue current medication pain.  The patient wanted to see her protein calorie malnutrition.  Monitor for now patient had anemia.  There is small drop in hemoglobin adequate.  That could be due to the IV fluid.  Monitor for now.  Patient also has thrombocytopenia on Promacta for now.  If platelet count continues dropped, then I will discontinue Lovenox.   input appreciated.  I will consult hospice.    2/3/2025    Continue current medication.  Supportive care.  CBC and CMP are stable.  Continue the COVID-19 infection.  Patient has hypertension and tachycardia yesterday..  From the bisoprolol.  Heart rate is controlled.  Blood pressure still slightly on the higher side but controlled.  Patient got anemia but hemoglobin hematocrit are stable.  Family is planning to sign up with hospice tomorrow.  We will take DVT, fall, aspiration, decubitus, DVT precaution.  This has been discussed with the patient and is agreeable to I    2/4/2025  Plan: Continue current medication.  Supportive care.  The patient is doing much better now.  The patient is off oxygen.  Patient medically stable.  Blood pressure stable.  Heart rate is under control.  The patient can be discharged when arrangements are made.           Lexx Marques MD

## 2025-02-05 NOTE — CARE PLAN
The patient's goals for the shift include unabe to assess    The clinical goals for the shift include monitor vitals, remain free from falls, change and reposition patient frequently      Problem: Skin  Goal: Decreased wound size/increased tissue granulation at next dressing change  Outcome: Progressing  Flowsheets (Taken 2/4/2025 2018)  Decreased wound size/increased tissue granulation at next dressing change: Protective dressings over bony prominences  Goal: Participates in plan/prevention/treatment measures  Outcome: Progressing  Flowsheets (Taken 2/4/2025 2018)  Participates in plan/prevention/treatment measures: Elevate heels  Goal: Prevent/manage excess moisture  Outcome: Progressing  Flowsheets (Taken 2/4/2025 2018)  Prevent/manage excess moisture: Cleanse incontinence/protect with barrier cream  Goal: Prevent/minimize sheer/friction injuries  Outcome: Progressing  Flowsheets (Taken 2/4/2025 2018)  Prevent/minimize sheer/friction injuries:   Use pull sheet   HOB 30 degrees or less   Turn/reposition every 2 hours/use positioning/transfer devices  Goal: Promote/optimize nutrition  Outcome: Progressing  Flowsheets (Taken 2/4/2025 2018)  Promote/optimize nutrition:   Monitor/record intake including meals   Consume > 50% meals/supplements  Goal: Promote skin healing  Outcome: Progressing  Flowsheets (Taken 2/4/2025 2018)  Promote skin healing:   Protective dressings over bony prominences   Turn/reposition every 2 hours/use positioning/transfer devices     Problem: Fall/Injury  Goal: Not fall by end of shift  Outcome: Progressing  Goal: Be free from injury by end of the shift  Outcome: Progressing  Goal: Verbalize understanding of personal risk factors for fall in the hospital  Outcome: Progressing  Goal: Verbalize understanding of risk factor reduction measures to prevent injury from fall in the home  Outcome: Progressing  Goal: Use assistive devices by end of the shift  Outcome: Progressing  Goal: Pace activities  to prevent fatigue by end of the shift  Outcome: Progressing     Problem: Pain - Adult  Goal: Verbalizes/displays adequate comfort level or baseline comfort level  Outcome: Progressing     Problem: Safety - Adult  Goal: Free from fall injury  Outcome: Progressing     Problem: Discharge Planning  Goal: Discharge to home or other facility with appropriate resources  Outcome: Progressing     Problem: Chronic Conditions and Co-morbidities  Goal: Patient's chronic conditions and co-morbidity symptoms are monitored and maintained or improved  Outcome: Progressing     Problem: Nutrition  Goal: Nutrient intake appropriate for maintaining nutritional needs  Outcome: Progressing

## 2025-02-05 NOTE — DISCHARGE SUMMARY
Discharge Diagnosis  Altered mental status, unspecified altered mental status type    Issues Requiring Follow-Up  COVID-19  Dementia  Atrial fibrillation  Constipation  GERD    Discharge Meds     Medication List      START taking these medications     acetaminophen 325 mg tablet; Commonly known as: Tylenol; Take 2 tablets   (650 mg) by mouth every 4 hours if needed for mild pain (1 - 3).   bisoprolol 10 mg tablet; Commonly known as: Zebeta; Take 1 tablet (10   mg) by mouth once daily.   docusate sodium 100 mg capsule; Commonly known as: Colace; Take 1   capsule (100 mg) by mouth 2 times a day.   ondansetron ODT 4 mg disintegrating tablet; Commonly known as:   Zofran-ODT; Dissolve 1 tablet (4 mg) in the mouth every 8 hours if needed   for nausea or vomiting.   pantoprazole 40 mg EC tablet; Commonly known as: ProtoNix; Take 1 tablet   (40 mg) by mouth once daily in the morning. Take before meals. Do not   crush, chew, or split.; Start taking on: February 6, 2025     CONTINUE taking these medications     lisinopril 10 mg tablet; Take 1 tablet (10 mg) by mouth once daily.       Test Results Pending At Discharge  Pending Labs       Order Current Status    Extra Urine Gray Tube Collected (01/31/25 4994)    Urinalysis with Reflex Culture and Microscopic In process            Hospital Course   Patient was admitted with a complaint of pain mental status.  Patient has advanced dementia patient found to have COVID-19.  Patient was treated for COVID-19.  All options were discussed with the family.  Family decided to go with the hospice.  Patient is being discharged to an extended care facility with a hospice care.    Pertinent Physical Exam At Time of Discharge  Physical Exam    Outpatient Follow-Up  Future Appointments   Date Time Provider Department Center   4/17/2025  2:30 PM Warren Mascorro MD [id number]         Lexx Marques MD

## 2025-02-05 NOTE — PROGRESS NOTES
Therapy Communication Note    Patient Name: Elian Betancourt  MRN: [id number]  Today's Date: 2/5/2025    Discipline: Physical Therapy    Missed Visit Reason:      Missed Time: Cancel    Comment: Pt has signed with hospice. Will DC patient from acute care rehab services.

## 2025-02-05 NOTE — PROGRESS NOTES
02/05/25 0847   Discharge Planning   Expected Discharge Disposition Inter  ([facility], operated by [facility] with Hospice)   Does the patient need discharge transport arranged? Yes   RoundTrip coordination needed? Yes   Has discharge transport been arranged? No     Long term care at [facility], operated by [facility] with Hospice.  Wife and [facility] to make financial arrangements.   Expect discharge today.    Informed Dr. Marques of this for the discharge.    1138- Still waiting on discharge;    Hospice has set up 2 pm  in anticipation of the discharge being placed.

## 2025-02-05 NOTE — PROGRESS NOTES
Elian Betancourt is a 79 y.o. male on day 5 of admission presenting with Altered mental status, unspecified altered mental status type.    Subjective   Patient seen and examined.  No new complaint.  Was comfortable lying in the bed.       Objective     Physical Exam  HEENT:  Head externally atraumatic,  extraocular movements intact, oral mucosa moist  Neck:  Supple, no JVP, no palpable adenopathy or thyromegaly.  No carotid bruit.  Chest:  Clear to auscultation and resonant.  Heart:  Regular rate and rhythm, no murmur or gallop could be appreciated.  Abdomen:  Soft, nontender, bowel sounds present, normoactive, no palpable hepatosplenomegaly.  Extremities:  No edema, pulses present, no cyanosis or clubbing.  CNS: Alert, oriented x 1-2, no new focal deficit.  Cranial nerves II through XII grossly intact.  Patient has cognitive impairment.  Skin:  No active rash.  Musculoskeletal:  No  apparent joint swelling or erythema, range of movement normal.  Last Recorded Vitals  Heart Rate:  [51-57]   Temp:  [36.4 °C (97.5 °F)-36.8 °C (98.2 °F)]   Resp:  [22]   BP: (135-149)/(74-86)   SpO2:  [94 %-97 %]     Intake/Output last 3 Shifts:  I/O last 3 completed shifts:  In: 2255 (32.8 mL/kg) [P.O.:275; IV Piggyback:1980]  Out: - (0 mL/kg)   Weight: 68.8 kg     Relevant Results  No results found for the last 90 days.    No results found for this or any previous visit (from the past 24 hours).     Current Facility-Administered Medications:     acetaminophen (Tylenol) tablet 650 mg, 650 mg, oral, q4h PRN **OR** acetaminophen (Tylenol) oral liquid 650 mg, 650 mg, oral, q4h PRN **OR** acetaminophen (Tylenol) suppository 650 mg, 650 mg, rectal, q4h PRN, Deepali Goldman MD    acetaminophen (Tylenol) tablet 650 mg, 650 mg, oral, q4h PRN **OR** acetaminophen (Tylenol) oral liquid 650 mg, 650 mg, nasogastric tube, q4h PRN **OR** acetaminophen (Tylenol) suppository 650 mg, 650 mg, rectal, q4h PRN, Deepali Goldman MD    benzocaine-menthol (Cepastat  Sore Throat) lozenge 1 lozenge, 1 lozenge, Mouth/Throat, q2h PRN, Deepali Goldman MD    bisoprolol (Zebeta) tablet 10 mg, 10 mg, oral, Daily, Joao Cantu, [facility], 10 mg at 02/04/25 2133    dextromethorphan-guaifenesin (Robitussin DM)  mg/5 mL oral liquid 5 mL, 5 mL, oral, q4h PRN, Deepali Goldman MD, 5 mL at 02/03/25 0107    dextrose 5 % and sodium chloride 0.45 % with KCl 20 mEq/L infusion, 100 mL/hr, intravenous, Continuous, Deepali Goldman MD, Stopped at 02/04/25 1133    docusate sodium (Colace) capsule 100 mg, 100 mg, oral, BID, Deepali Goldman MD, 100 mg at 02/04/25 1022    enoxaparin (Lovenox) syringe 40 mg, 40 mg, subcutaneous, Daily, Evin Harrington MD, 40 mg at 02/05/25 0924    guaiFENesin (Mucinex) 12 hr tablet 600 mg, 600 mg, oral, q12h PRN, Deepali Goldman MD    lisinopril tablet 10 mg, 10 mg, oral, Daily, Evin Harrington MD, 10 mg at 02/05/25 0924    melatonin tablet 3 mg, 3 mg, oral, Nightly PRN, Deepali Goldman MD, 3 mg at 02/04/25 2133    ondansetron ODT (Zofran-ODT) disintegrating tablet 4 mg, 4 mg, oral, q8h PRN **OR** ondansetron (Zofran) injection 4 mg, 4 mg, intravenous, q8h PRN, Deepali Goldman MD    pantoprazole (ProtoNix) EC tablet 40 mg, 40 mg, oral, Daily before breakfast, 40 mg at 02/05/25 0604 **OR** pantoprazole (ProtoNix) injection 40 mg, 40 mg, intravenous, Daily before breakfast, Deepali Goldman MD, 40 mg at 02/03/25 0619    polyethylene glycol (Glycolax, Miralax) packet 17 g, 17 g, oral, Daily, Evin Harrington MD, 17 g at 02/04/25 1022   Assessment/Plan   Assessment & Plan  Altered mental status, unspecified altered mental status type    Dementia    Disability of walking    Benign prostatic hyperplasia    Hypertension    Pure hypercholesterolemia    Atrial fibrillation, unspecified type (Multi)    COVID-19    Dehydration  Continue current medication.  Supportive care.  The patient is on room air.  Blood pressure is controlled.  Heart rate is under control.  Saturating 94% room air.  Heart  patient was tachycardic with is better now.  Patient had hypoglycemia.  Continue current medication pain.  The patient wanted to see her protein calorie malnutrition.  Monitor for now patient had anemia.  There is small drop in hemoglobin adequate.  That could be due to the IV fluid.  Monitor for now.  Patient also has thrombocytopenia on Promacta for now.  If platelet count continues dropped, then I will discontinue Lovenox.   input appreciated.  I will consult hospice.    2/3/2025    Continue current medication.  Supportive care.  CBC and CMP are stable.  Continue the COVID-19 infection.  Patient has hypertension and tachycardia yesterday..  From the bisoprolol.  Heart rate is controlled.  Blood pressure still slightly on the higher side but controlled.  Patient got anemia but hemoglobin hematocrit are stable.  Family is planning to sign up with hospice tomorrow.  We will take DVT, fall, aspiration, decubitus, DVT precaution.  This has been discussed with the patient and is agreeable to I    2/4/2025  Plan: Continue current medication.  Supportive care.  The patient is doing much better now.  The patient is off oxygen.  Patient medically stable.  Blood pressure stable.  Heart rate is under control.  The patient can be discharged when arrangements are made.    2/5/2025    Plan continue current medication.  Patient medically stable.  Patient is being discharged to a extended-care facility with hospice care.           eLxx Marques MD

## 2025-02-05 NOTE — ASSESSMENT & PLAN NOTE
Continue current medication.  Supportive care.  The patient is on room air.  Blood pressure is controlled.  Heart rate is under control.  Saturating 94% room air.  Heart patient was tachycardic with is better now.  Patient had hypoglycemia.  Continue current medication pain.  The patient wanted to see her protein calorie malnutrition.  Monitor for now patient had anemia.  There is small drop in hemoglobin adequate.  That could be due to the IV fluid.  Monitor for now.  Patient also has thrombocytopenia on Promacta for now.  If platelet count continues dropped, then I will discontinue Lovenox.   input appreciated.  I will consult hospice.    2/3/2025    Continue current medication.  Supportive care.  CBC and CMP are stable.  Continue the COVID-19 infection.  Patient has hypertension and tachycardia yesterday..  From the bisoprolol.  Heart rate is controlled.  Blood pressure still slightly on the higher side but controlled.  Patient got anemia but hemoglobin hematocrit are stable.  Family is planning to sign up with hospice tomorrow.  We will take DVT, fall, aspiration, decubitus, DVT precaution.  This has been discussed with the patient and is agreeable to I    2/4/2025  Plan: Continue current medication.  Supportive care.  The patient is doing much better now.  The patient is off oxygen.  Patient medically stable.  Blood pressure stable.  Heart rate is under control.  The patient can be discharged when arrangements are made.

## 2025-02-09 ENCOUNTER — NURSING HOME VISIT (OUTPATIENT)
Dept: POST ACUTE CARE | Facility: EXTERNAL LOCATION | Age: 80
End: 2025-02-09
Payer: MEDICARE

## 2025-02-09 DIAGNOSIS — Z87.81 H/O CLAVICLE FRACTURE: ICD-10-CM

## 2025-02-09 DIAGNOSIS — K86.1 OTHER CHRONIC PANCREATITIS: ICD-10-CM

## 2025-02-09 DIAGNOSIS — U07.1 COVID-19: Primary | ICD-10-CM

## 2025-02-09 DIAGNOSIS — I71.00 DISSECTION OF AORTA, UNSPECIFIED PORTION OF AORTA (MULTI): ICD-10-CM

## 2025-02-09 DIAGNOSIS — I10 HYPERTENSION, UNSPECIFIED TYPE: ICD-10-CM

## 2025-02-09 DIAGNOSIS — S81.819D LACERATION OF LOWER EXTREMITY, UNSPECIFIED LATERALITY, SUBSEQUENT ENCOUNTER: ICD-10-CM

## 2025-02-09 DIAGNOSIS — M19.90 OSTEOARTHRITIS, UNSPECIFIED OSTEOARTHRITIS TYPE, UNSPECIFIED SITE: ICD-10-CM

## 2025-02-09 DIAGNOSIS — E78.5 HYPERLIPIDEMIA, UNSPECIFIED HYPERLIPIDEMIA TYPE: ICD-10-CM

## 2025-02-09 DIAGNOSIS — E86.0 DEHYDRATION: ICD-10-CM

## 2025-02-09 DIAGNOSIS — R41.82 ALTERED MENTAL STATUS, UNSPECIFIED ALTERED MENTAL STATUS TYPE: ICD-10-CM

## 2025-02-09 DIAGNOSIS — R53.1 WEAKNESS: ICD-10-CM

## 2025-02-09 DIAGNOSIS — Z91.81 AT RISK FOR FALLING: ICD-10-CM

## 2025-02-09 DIAGNOSIS — N40.0 BENIGN PROSTATIC HYPERPLASIA, UNSPECIFIED WHETHER LOWER URINARY TRACT SYMPTOMS PRESENT: ICD-10-CM

## 2025-02-09 DIAGNOSIS — S22.49XD CLOSED FRACTURE OF MULTIPLE RIBS WITH ROUTINE HEALING, UNSPECIFIED LATERALITY, SUBSEQUENT ENCOUNTER: ICD-10-CM

## 2025-02-09 DIAGNOSIS — F03.90 DEMENTIA, UNSPECIFIED DEMENTIA SEVERITY, UNSPECIFIED DEMENTIA TYPE, UNSPECIFIED WHETHER BEHAVIORAL, PSYCHOTIC, OR MOOD DISTURBANCE OR ANXIETY (MULTI): ICD-10-CM

## 2025-02-09 PROCEDURE — 99306 1ST NF CARE HIGH MDM 50: CPT | Performed by: INTERNAL MEDICINE

## 2025-02-09 NOTE — LETTER
Patient: Elian Betancourt  : 1945    Encounter Date: 2025    PLACE OF SERVICE:  [facility].    This is a subsequent visit.    Subjective  Patient ID: Elian Betancourt is a 79 y.o. male who presents for Follow-up.    Mr. Elian Betancourt is an 79-year-old male with history of dementia with recent COVID-19 infection  with mental status change and dehydration.  He is unable to care for himself and requires supported care.    Review of Systems   Constitutional:  Negative for chills and fever.   Cardiovascular:  Negative for chest pain.   All other systems reviewed and are negative.    Objective  /82   Pulse 82   Temp 36.9 °C (98.4 °F)   Resp 16     Physical Exam  Vitals reviewed.   Constitutional:       General: He is not in acute distress.     Comments: This is a well-developed, well-nourished male, sitting in chair.   HENT:      Right Ear: Tympanic membrane, ear canal and external ear normal.      Left Ear: Tympanic membrane, ear canal and external ear normal.   Eyes:      General: No scleral icterus.     Pupils: Pupils are equal, round, and reactive to light.   Neck:      Vascular: No carotid bruit.   Cardiovascular:      Heart sounds: Normal heart sounds, S1 normal and S2 normal. No murmur heard.     No friction rub.   Pulmonary:      Effort: Pulmonary effort is normal.      Breath sounds: Normal breath sounds and air entry.   Abdominal:      Palpations: There is no hepatomegaly, splenomegaly or mass.   Musculoskeletal:         General: No swelling or deformity. Normal range of motion.      Cervical back: Neck supple.      Right lower leg: No edema.      Left lower leg: No edema.   Lymphadenopathy:      Cervical: No cervical adenopathy.      Upper Body:      Right upper body: No axillary adenopathy.      Left upper body: No axillary adenopathy.      Lower Body: No right inguinal adenopathy. No left inguinal adenopathy.   Neurological:      Mental Status: He is alert.      Cranial Nerves:  Cranial nerves 2-12 are intact. No cranial nerve deficit.      Sensory: No sensory deficit.      Motor: Motor function is intact. No weakness.      Gait: Gait is intact.      Deep Tendon Reflexes: Reflexes normal.      Comments: The patient is  oriented x2.   Psychiatric:         Mood and Affect: Mood normal. Mood is not anxious or depressed. Affect is not angry.         Behavior: Behavior is not agitated.         Thought Content: Thought content normal.         Judgment: Judgment normal.     LAB WORK:  Laboratory studies were reviewed.    Assessment/Plan  Problem List Items Addressed This Visit             ICD-10-CM       Cardiac and Vasculature    Hypertension (Chronic) I10       Genitourinary and Reproductive    Benign prostatic hyperplasia N40.0    Dehydration E86.0       Infectious Diseases    COVID-19 - Primary U07.1       Neuro    Dementia F03.90    Altered mental status, unspecified altered mental status type R41.82     Other Visit Diagnoses         Codes    Osteoarthritis, unspecified osteoarthritis type, unspecified site     M19.90    Hyperlipidemia, unspecified hyperlipidemia type     E78.5    Dissection of aorta, unspecified portion of aorta (Multi)     I71.00    Closed fracture of multiple ribs with routine healing, unspecified laterality, subsequent encounter     S22.49XD    H/O clavicle fracture     Z87.81    Laceration of lower extremity, unspecified laterality, subsequent encounter     S81.819D    Weakness     R53.1    At risk for falling     Z91.81        1. COVID-19, stable.  2. Mental status change, on supportive care.  3. Dehydration.  Continue oral replacement.  4. Dementia, unchanged.  5. Hypertension, medically controlled.  6. BPH, on tamsulosin.  7. Osteoarthritis, on Tylenol.  8. Hyperlipidemia, on statin.  9. History of aortic dissection.  Follow with Vascular Surgery.  10. Rib fracture, on pain control.  11. Clavicle fracture, on pain control.  12. Leg laceration. Continue wound  care.  13. Weakness, on PT/OT.  14. Fall risk, on fall precautions.    Scribe Attestation  By signing my name below, I, Price Espinoza attest that this documentation has been prepared under the direction and in the presence of Yvonne Goldman MD.     All medical record entries made by the scribe were personally dictated by me I have reviewed the chart and agree the record accurately reflects my personal performance of his history physical examination and management      Electronically Signed By: Yvonne Goldman MD   2/19/25 12:18 AM

## 2025-02-13 NOTE — DOCUMENTATION CLARIFICATION NOTE
PATIENT:               ZACHARY AMBROSIO  ACCT #:                  [id number]  MRN:                       [id number]  :                       1945  ADMIT DATE:       2025 2:00 PM  DISCH DATE:        2025 2:00 PM  RESPONDING PROVIDER #:        [id number]          PROVIDER RESPONSE TEXT:    Complete immobility due to severe disability and frailty    CDI QUERY TEXT:    Clarification    Instruction:    Based on your assessment of the patient and the clinical information, please provide the requested documentation by clicking on the appropriate radio button and enter any additional information if prompted.    Question: Is there a diagnosis indicative of the patients documented debility requiring total assistance with all ADL/IADLs    When answering this query, please exercise your independent professional judgment. The fact that a question is being asked, does not imply that any particular answer is desired or expected.    The patient's clinical indicators include:  Clinical Information: 79y.o. M admitted s/p 3 falls with AMS, COVID-19, Isolation, dehydration, A fib, Dementia & HTN     H&P: His wife brought him to the hospital because she could not take care of things falling and his confusion is getting worse with increasing agitation. Altered mental status, unspecified. COVID-19, Dementia, disability of walking.     Progress Note: D/W wife patient is really declining fast in past few weeks. He has dementia for past few years. Wants him to be DNR, wants to consider hospice     OT Progress Note: 24hr mobility assistance needed, 24hr ADL assistance needed, 24hr supervision for safety awareness needed. [facility] Daily Activity - Total score: 6    2 PT Progress Note: Total assist, Assist x2, recurrent falls. Disoriented x4, PT recommends 24/7 care at discharge. [facility] Basic Mobility - Total score: 6     Care Transitions: Patient is a disoriented, bed bound, unable to ambulate/care for himself.      Progress Note: Patient is bedbound and contracted.    2/5 D/C Summary: AMS, COVID-19, Dementia, Atrial fib, Constipation. Patient has advanced dementia patient found to have COVID-19. Patient is being discharged to an extended care facility with a hospice care.    Treatment:  PT/OT therapies initially, then transitioned to Hospice care    Risk Factors: Dementia, HTN, recurrent falls, Scoliosis, Chronic Pancreatitis, BMI: 25.2  Options provided:  -- Functional quadriplegia  -- Complete immobility due to severe disability and frailty  -- Other - I will add my own diagnosis  -- Refer to Clinical Documentation Reviewer    Query created by: Daisy Li on 2/12/2025 3:51 PM      Electronically signed by:  KARLA GANDARA MD 2/12/2025 8:26 PM

## 2025-02-13 NOTE — DOCUMENTATION CLARIFICATION NOTE
PATIENT:               ZACHARY AMBROSIO  ACCT #:                  [id number]  MRN:                       [id number]  :                       1945  ADMIT DATE:       2025 2:00 PM  DISCH DATE:        2025 2:00 PM  RESPONDING PROVIDER #:        [id number]          PROVIDER RESPONSE TEXT:    Metabolic encephalopathy superimposed on Dementia    CDI QUERY TEXT:    Clarification    Instruction:    Based on your assessment of the patient and the clinical information, please provide the requested documentation by clicking on the appropriate radio button and enter any additional information if prompted.    Question: Please further clarify the most likely etiology of the altered mental status as    When answering this query, please exercise your independent professional judgment. The fact that a question is being asked, does not imply that any particular answer is desired or expected.    The patient's clinical indicators include:  Clinical Information: 79y.o. M admitted s/p 3 falls with AMS, COVID-19, Isolation, dehydration, A fib, Dementia & HTN     ED Provider Note: AMS, Dehydration, advancing Dementia. COVID-19 infection     H&P: His wife brought him to the hospital because she could not take care of things falling and his confusion is getting worse with increasing agitation. Altered mental status, unspecified. COVID-19, dehydration. Atrial fibrillation. Dementia, disability of walking. Hypertension. Will keep patient in COVID isolation     Progress Note: D/W wife patient is really declining fast in past few weeks. He has dementia for past few years. Wants him to be DNR ml, wants to consider hospice    2/3 Progress Note: Patient alert, oriented x1, no focal deficit. He is very confused.     D/C Summary: AMS, COVID-19, Dementia, Atrial fib, Constipation, GERD. Patient has advanced dementia patient found to have COVID-19. Patient is being discharged to an extended care facility with a hospice  care.    Clinical Indicators:  1/31 Coronavirus 2019: Detected  1/31 RBC 3.50, WBC 5.0, Plts 157  2/1   RBC 3.17, WBC 4.2, Plts 145  2/3   RBC 3.52, WBC 5.4, Plts 184    Treatment: Hospice care    Risk Factors: Dementia, HTN, recurrent falls, Scoliosis, Chronic Pancreatitis, BMI: 25.2  Options provided:  -- Metabolic encephalopathy superimposed on Dementia  -- Toxic encephalopathy 2/2 COVID-19 infection  -- Worsening dementia  -- Other - I will add my own diagnosis  -- Refer to Clinical Documentation Reviewer    Query created by: Daisy Li on 2/12/2025 3:48 PM      Electronically signed by:  KARLA GANDARA MD 2/12/2025 8:26 PM

## 2025-02-13 NOTE — DOCUMENTATION CLARIFICATION NOTE
PATIENT:               ZACHARY AMBROSIO  ACCT #:                  [id number]  MRN:                       [id number]  :                       1945  ADMIT DATE:       2025 2:00 PM  DISCH DATE:        2025 2:00 PM  RESPONDING PROVIDER #:        [id number]          PROVIDER RESPONSE TEXT:    Severe protein calorie malnutrition    CDI QUERY TEXT:    Clarification    Instruction:    Based on your assessment of the patient and the clinical information, please provide the requested documentation by clicking on the appropriate radio button and enter any additional information if prompted.    Question: Please further clarify this patient nutritional status as    When answering this query, please exercise your independent professional judgment. The fact that a question is being asked, does not imply that any particular answer is desired or expected.    The patient's clinical indicators include:  Clinical Information: 79y.o. M admitted with AMS, COVID-19, Isolation, dehydration, A fib & Dementia     ED Provider Note: AMS, Dehydration, advancing Dementia. COVID-19 infection     H&P: His wife brought him to the hospital because she could not take care of things falling and his confusion is getting worse with increasing agitation. Altered mental status, unspecified. COVID-19, dehydration. Atrial fibrillation. Dementia, disability of walking. Hypertension. Will keep patient in COVID isolation     Progress Note: Patient had hypoglycemia. The patient wanted to see her protein calorie malnutrition. Monitor for now patient had anemia, there is small drop in hemoglobin adequate.    CDI: There is no Dietician consult, only documentation of protein calorie malnutrition by Dr. Marques    Clinical Indicators:  BMI: 25.24     Coronavirus 2019: Detected     RBC 3.17, WBC 4.2, Plts 145     Cl 108, Ca 8.3, Albumin 3.0, AST 48, Protein 6.0     Cl 109, Ca 7.7, Albumin 2.6, AST 49, Protein 5.1  2/3   Cl 109, Ca  7.8    Treatment: Hospice care    Risk Factors: Dementia, Chronic pancreatitis, recurrent falls  Options provided:  -- Mild protein calorie malnutrition  -- Moderate protein calorie malnutrition  -- Severe protein calorie malnutrition  -- Other - I will add my own diagnosis  -- Refer to Clinical Documentation Reviewer    Query created by: Daisy Li on 2/12/2025 3:44 PM      Electronically signed by:  KARLA GANDARA MD 2/12/2025 8:26 PM

## 2025-02-17 VITALS
DIASTOLIC BLOOD PRESSURE: 82 MMHG | SYSTOLIC BLOOD PRESSURE: 126 MMHG | TEMPERATURE: 98.4 F | HEART RATE: 82 BPM | RESPIRATION RATE: 16 BRPM

## 2025-02-17 ASSESSMENT — ENCOUNTER SYMPTOMS
FEVER: 0
CHILLS: 0

## 2025-02-17 NOTE — PROGRESS NOTES
PLACE OF SERVICE:  [facility].    This is a history and physical visit.    Subjective   Patient ID: Elian Betancourt is a 79 y.o. male who presents for Follow-up.    Mr. Elian Betancourt is an 79-year-old male with history of dementia with recent COVID-19 infection  with mental status change and dehydration.  He is unable to care for himself and requires supported care.    Review of Systems   Constitutional:  Negative for chills and fever.   Cardiovascular:  Negative for chest pain.   All other systems reviewed and are negative.    Objective   /82   Pulse 82   Temp 36.9 °C (98.4 °F)   Resp 16     Physical Exam  Vitals reviewed.   Constitutional:       General: He is not in acute distress.     Comments: This is a well-developed, well-nourished male, sitting in chair.   HENT:      Right Ear: Tympanic membrane, ear canal and external ear normal.      Left Ear: Tympanic membrane, ear canal and external ear normal.   Eyes:      General: No scleral icterus.     Pupils: Pupils are equal, round, and reactive to light.   Neck:      Vascular: No carotid bruit.   Cardiovascular:      Heart sounds: Normal heart sounds, S1 normal and S2 normal. No murmur heard.     No friction rub.   Pulmonary:      Effort: Pulmonary effort is normal.      Breath sounds: Normal breath sounds and air entry.   Abdominal:      Palpations: There is no hepatomegaly, splenomegaly or mass.   Musculoskeletal:         General: No swelling or deformity. Normal range of motion.      Cervical back: Neck supple.      Right lower leg: No edema.      Left lower leg: No edema.   Lymphadenopathy:      Cervical: No cervical adenopathy.      Upper Body:      Right upper body: No axillary adenopathy.      Left upper body: No axillary adenopathy.      Lower Body: No right inguinal adenopathy. No left inguinal adenopathy.   Neurological:      Mental Status: He is alert.      Cranial Nerves: Cranial nerves 2-12 are intact. No cranial nerve deficit.       Sensory: No sensory deficit.      Motor: Motor function is intact. No weakness.      Gait: Gait is intact.      Deep Tendon Reflexes: Reflexes normal.      Comments: The patient is  oriented x2.   Psychiatric:         Mood and Affect: Mood normal. Mood is not anxious or depressed. Affect is not angry.         Behavior: Behavior is not agitated.         Thought Content: Thought content normal.         Judgment: Judgment normal.     LAB WORK:  Laboratory studies were reviewed.    Assessment/Plan   Problem List Items Addressed This Visit             ICD-10-CM       Cardiac and Vasculature    Hypertension (Chronic) I10       Genitourinary and Reproductive    Benign prostatic hyperplasia N40.0    Dehydration E86.0       Infectious Diseases    COVID-19 - Primary U07.1       Neuro    Dementia F03.90    Altered mental status, unspecified altered mental status type R41.82     Other Visit Diagnoses         Codes    Osteoarthritis, unspecified osteoarthritis type, unspecified site     M19.90    Hyperlipidemia, unspecified hyperlipidemia type     E78.5    Dissection of aorta, unspecified portion of aorta (Multi)     I71.00    Closed fracture of multiple ribs with routine healing, unspecified laterality, subsequent encounter     S22.49XD    H/O clavicle fracture     Z87.81    Laceration of lower extremity, unspecified laterality, subsequent encounter     S81.819D    Weakness     R53.1    At risk for falling     Z91.81        1. COVID-19, stable.  2. Mental status change, on supportive care.  3. Dehydration.  Continue oral replacement.  4. Dementia, unchanged.  5. Hypertension, medically controlled.  6. BPH, on tamsulosin.  7. Osteoarthritis, on Tylenol.  8. Hyperlipidemia, on statin.  9. History of aortic dissection.  Follow with Vascular Surgery.  10. Rib fracture, on pain control.  11. Clavicle fracture, on pain control.  12. Leg laceration. Continue wound care.  13. Weakness, on PT/OT.  14. Fall risk, on fall  precautions.    Price Attestation  By signing my name below, I, Price Espinoza attest that this documentation has been prepared under the direction and in the presence of Yvonne Goldman MD.     All medical record entries made by the ludyibdickson were personally dictated by me I have reviewed the chart and agree the record accurately reflects my personal performance of his history physical examination and management

## 2025-02-19 PROBLEM — K86.1 OTHER CHRONIC PANCREATITIS: Status: ACTIVE | Noted: 2025-02-19

## 2025-03-01 ENCOUNTER — NURSING HOME VISIT (OUTPATIENT)
Dept: POST ACUTE CARE | Facility: EXTERNAL LOCATION | Age: 80
End: 2025-03-01
Payer: MEDICARE

## 2025-03-01 DIAGNOSIS — Z91.81 AT RISK FOR FALLING: ICD-10-CM

## 2025-03-01 DIAGNOSIS — S22.49XD CLOSED FRACTURE OF MULTIPLE RIBS WITH ROUTINE HEALING, UNSPECIFIED LATERALITY, SUBSEQUENT ENCOUNTER: ICD-10-CM

## 2025-03-01 DIAGNOSIS — M19.90 OSTEOARTHRITIS, UNSPECIFIED OSTEOARTHRITIS TYPE, UNSPECIFIED SITE: ICD-10-CM

## 2025-03-01 DIAGNOSIS — N40.0 BENIGN PROSTATIC HYPERPLASIA, UNSPECIFIED WHETHER LOWER URINARY TRACT SYMPTOMS PRESENT: ICD-10-CM

## 2025-03-01 DIAGNOSIS — S81.819D LACERATION OF LOWER EXTREMITY, UNSPECIFIED LATERALITY, SUBSEQUENT ENCOUNTER: ICD-10-CM

## 2025-03-01 DIAGNOSIS — R41.82 ALTERED MENTAL STATUS, UNSPECIFIED ALTERED MENTAL STATUS TYPE: ICD-10-CM

## 2025-03-01 DIAGNOSIS — I71.00 DISSECTION OF AORTA, UNSPECIFIED PORTION OF AORTA (MULTI): ICD-10-CM

## 2025-03-01 DIAGNOSIS — I10 HYPERTENSION, UNSPECIFIED TYPE: ICD-10-CM

## 2025-03-01 DIAGNOSIS — F03.90 DEMENTIA, UNSPECIFIED DEMENTIA SEVERITY, UNSPECIFIED DEMENTIA TYPE, UNSPECIFIED WHETHER BEHAVIORAL, PSYCHOTIC, OR MOOD DISTURBANCE OR ANXIETY (MULTI): Primary | ICD-10-CM

## 2025-03-01 DIAGNOSIS — U07.1 COVID-19: ICD-10-CM

## 2025-03-01 DIAGNOSIS — E78.5 HYPERLIPIDEMIA, UNSPECIFIED HYPERLIPIDEMIA TYPE: ICD-10-CM

## 2025-03-01 PROCEDURE — 99309 SBSQ NF CARE MODERATE MDM 30: CPT | Performed by: INTERNAL MEDICINE

## 2025-03-01 NOTE — LETTER
Patient: Elian Betancourt  : 1945    Encounter Date: 2025    PLACE OF SERVICE:  [facility], [place], operated by [facility]    This is a subsequent visit.    Subjective  Patient ID: Elian Betancourt is a 79 y.o. male who presents for Follow-up.    Mr. Elian Betancourt is an 79-year-old male with history of dementia with mental status change with recent COVID-19 infection.  He is currently on hospice care.    Review of Systems   Constitutional:  Negative for chills and fever.   Cardiovascular:  Negative for chest pain.   All other systems reviewed and are negative.    Objective  /78   Pulse 78   Temp 36.6 °C (97.9 °F)   Resp 16     Physical Exam  Vitals reviewed.   Constitutional:       General: He is not in acute distress.     Comments: This is a well-developed and well-nourished male, sitting in a chair.   HENT:      Right Ear: Tympanic membrane, ear canal and external ear normal.      Left Ear: Tympanic membrane, ear canal and external ear normal.   Eyes:      General: No scleral icterus.     Pupils: Pupils are equal, round, and reactive to light.   Neck:      Vascular: No carotid bruit.   Cardiovascular:      Heart sounds: Normal heart sounds, S1 normal and S2 normal. No murmur heard.     No friction rub.   Pulmonary:      Effort: Pulmonary effort is normal.      Breath sounds: Normal breath sounds and air entry.   Abdominal:      Palpations: There is no hepatomegaly, splenomegaly or mass.   Musculoskeletal:         General: No swelling or deformity. Normal range of motion.      Cervical back: Neck supple.      Right lower leg: No edema.      Left lower leg: No edema.   Lymphadenopathy:      Cervical: No cervical adenopathy.      Upper Body:      Right upper body: No axillary adenopathy.      Left upper body: No axillary adenopathy.      Lower Body: No right inguinal adenopathy. No left inguinal adenopathy.   Neurological:      Mental Status: He is alert.      Cranial Nerves: Cranial nerves 2-12 are intact. No cranial  nerve deficit.      Sensory: No sensory deficit.      Motor: Motor function is intact. No weakness.      Gait: Gait is intact.      Deep Tendon Reflexes: Reflexes normal.      Comments: The patient is oriented x2.   Psychiatric:         Mood and Affect: Mood normal. Mood is not anxious or depressed. Affect is not angry.         Behavior: Behavior is not agitated.         Thought Content: Thought content normal.         Judgment: Judgment normal.     LAB WORK: Laboratory studies reviewed.    Assessment/Plan  Problem List Items Addressed This Visit             ICD-10-CM    Benign prostatic hyperplasia N40.0    Hypertension (Chronic) I10    Dementia - Primary F03.90    Altered mental status, unspecified altered mental status type R41.82    COVID-19 U07.1     Other Visit Diagnoses         Codes    Hyperlipidemia, unspecified hyperlipidemia type     E78.5    Osteoarthritis, unspecified osteoarthritis type, unspecified site     M19.90    Laceration of lower extremity, unspecified laterality, subsequent encounter     S81.819D    Closed fracture of multiple ribs with routine healing, unspecified laterality, subsequent encounter     S22.49XD    Dissection of aorta, unspecified portion of aorta (Multi)     I71.00    At risk for falling     Z91.81        1. Dementia, unchanged.  2. Mental status change, continue supportive care, on hospice care.  3. Recent COVID-19, stable.  4. BPH, on tamsulosin.  5. Hypertension, med controlled.  6. Hyperlipidemia, on statin.  7. Osteoarthritis, on Tylenol.  8. Leg laceration, continue wound care.  9. Rib fracture, on pain control.  10. History of aortic dissection, follow-up with Vascular Surgery.  11. Fall risk, fall precautions.    Scribe Attestation  By signing my name below, IReny Scribe attest that this documentation has been prepared under the direction and in the presence of Yvonne Goldman MD.     All medical record entries made by the scribe were personally dictated by me  I have reviewed the chart and agree the record accurately reflects my personal performance of his history physical examination and management      Electronically Signed By: Yvonne Goldman MD   3/5/25 12:50 AM

## 2025-03-03 VITALS
SYSTOLIC BLOOD PRESSURE: 120 MMHG | RESPIRATION RATE: 16 BRPM | TEMPERATURE: 97.9 F | HEART RATE: 78 BPM | DIASTOLIC BLOOD PRESSURE: 78 MMHG

## 2025-03-03 ASSESSMENT — ENCOUNTER SYMPTOMS
FEVER: 0
CHILLS: 0

## 2025-03-03 NOTE — PROGRESS NOTES
PLACE OF SERVICE:  [facility], [place], operated by [facility]    This is a subsequent visit.    Subjective   Patient ID: Elian Betancourt is a 79 y.o. male who presents for Follow-up.    Mr. Elian Betancourt is an 79-year-old male with history of dementia with mental status change with recent COVID-19 infection.  He is currently on hospice care.    Review of Systems   Constitutional:  Negative for chills and fever.   Cardiovascular:  Negative for chest pain.   All other systems reviewed and are negative.    Objective   /78   Pulse 78   Temp 36.6 °C (97.9 °F)   Resp 16     Physical Exam  Vitals reviewed.   Constitutional:       General: He is not in acute distress.     Comments: This is a well-developed and well-nourished male, sitting in a chair.   HENT:      Right Ear: Tympanic membrane, ear canal and external ear normal.      Left Ear: Tympanic membrane, ear canal and external ear normal.   Eyes:      General: No scleral icterus.     Pupils: Pupils are equal, round, and reactive to light.   Neck:      Vascular: No carotid bruit.   Cardiovascular:      Heart sounds: Normal heart sounds, S1 normal and S2 normal. No murmur heard.     No friction rub.   Pulmonary:      Effort: Pulmonary effort is normal.      Breath sounds: Normal breath sounds and air entry.   Abdominal:      Palpations: There is no hepatomegaly, splenomegaly or mass.   Musculoskeletal:         General: No swelling or deformity. Normal range of motion.      Cervical back: Neck supple.      Right lower leg: No edema.      Left lower leg: No edema.   Lymphadenopathy:      Cervical: No cervical adenopathy.      Upper Body:      Right upper body: No axillary adenopathy.      Left upper body: No axillary adenopathy.      Lower Body: No right inguinal adenopathy. No left inguinal adenopathy.   Neurological:      Mental Status: He is alert.      Cranial Nerves: Cranial nerves 2-12 are intact. No cranial nerve deficit.      Sensory: No sensory deficit.      Motor: Motor  function is intact. No weakness.      Gait: Gait is intact.      Deep Tendon Reflexes: Reflexes normal.      Comments: The patient is oriented x2.   Psychiatric:         Mood and Affect: Mood normal. Mood is not anxious or depressed. Affect is not angry.         Behavior: Behavior is not agitated.         Thought Content: Thought content normal.         Judgment: Judgment normal.     LAB WORK: Laboratory studies reviewed.    Assessment/Plan   Problem List Items Addressed This Visit             ICD-10-CM    Benign prostatic hyperplasia N40.0    Hypertension (Chronic) I10    Dementia - Primary F03.90    Altered mental status, unspecified altered mental status type R41.82    COVID-19 U07.1     Other Visit Diagnoses         Codes    Hyperlipidemia, unspecified hyperlipidemia type     E78.5    Osteoarthritis, unspecified osteoarthritis type, unspecified site     M19.90    Laceration of lower extremity, unspecified laterality, subsequent encounter     S81.819D    Closed fracture of multiple ribs with routine healing, unspecified laterality, subsequent encounter     S22.49XD    Dissection of aorta, unspecified portion of aorta (Multi)     I71.00    At risk for falling     Z91.81        1. Dementia, unchanged.  2. Mental status change, continue supportive care, on hospice care.  3. Recent COVID-19, stable.  4. BPH, on tamsulosin.  5. Hypertension, med controlled.  6. Hyperlipidemia, on statin.  7. Osteoarthritis, on Tylenol.  8. Leg laceration, continue wound care.  9. Rib fracture, on pain control.  10. History of aortic dissection, follow-up with Vascular Surgery.  11. Fall risk, fall precautions.    Scribe Attestation  By signing my name below, IReny Scribe attest that this documentation has been prepared under the direction and in the presence of Yvonne Goldman MD.     All medical record entries made by the scribe were personally dictated by me I have reviewed the chart and agree the record accurately reflects  my personal performance of his history physical examination and management

## 2025-04-13 ENCOUNTER — NURSING HOME VISIT (OUTPATIENT)
Dept: POST ACUTE CARE | Facility: EXTERNAL LOCATION | Age: 80
End: 2025-04-13
Payer: MEDICARE

## 2025-04-13 DIAGNOSIS — K21.9 GASTROESOPHAGEAL REFLUX DISEASE, UNSPECIFIED WHETHER ESOPHAGITIS PRESENT: ICD-10-CM

## 2025-04-13 DIAGNOSIS — M54.30 SCIATICA, UNSPECIFIED LATERALITY: ICD-10-CM

## 2025-04-13 DIAGNOSIS — M19.90 OSTEOARTHRITIS, UNSPECIFIED OSTEOARTHRITIS TYPE, UNSPECIFIED SITE: ICD-10-CM

## 2025-04-13 DIAGNOSIS — Z91.81 AT RISK FOR FALLING: ICD-10-CM

## 2025-04-13 DIAGNOSIS — E78.5 HYPERLIPIDEMIA, UNSPECIFIED HYPERLIPIDEMIA TYPE: ICD-10-CM

## 2025-04-13 DIAGNOSIS — F03.90 DEMENTIA, UNSPECIFIED DEMENTIA SEVERITY, UNSPECIFIED DEMENTIA TYPE, UNSPECIFIED WHETHER BEHAVIORAL, PSYCHOTIC, OR MOOD DISTURBANCE OR ANXIETY (MULTI): Primary | ICD-10-CM

## 2025-04-13 DIAGNOSIS — I10 HYPERTENSION, UNSPECIFIED TYPE: ICD-10-CM

## 2025-04-13 DIAGNOSIS — N40.0 BENIGN PROSTATIC HYPERPLASIA, UNSPECIFIED WHETHER LOWER URINARY TRACT SYMPTOMS PRESENT: ICD-10-CM

## 2025-04-13 PROCEDURE — 99308 SBSQ NF CARE LOW MDM 20: CPT | Performed by: INTERNAL MEDICINE

## 2025-04-13 NOTE — LETTER
Patient: Elian Betancourt  : 1945    Encounter Date: 2025    PLACE OF SERVICE:  [facility]    This is a subsequent visit.    Subjective  Patient ID: Elian Betancourt is a 79 y.o. male who presents for Follow-up.    Mr. Elian Betancourt is an 79-year-old male with history of dementia with sciatica.  He has a difficult time ambulating and requires supportive care.  He is currently on hospice care.    Review of Systems   Constitutional:  Negative for chills and fever.   Cardiovascular:  Negative for chest pain.   All other systems reviewed and are negative.    Objective  /82   Pulse 78   Temp 36.5 °C (97.7 °F)   Resp 16     Physical Exam  Vitals reviewed.   Constitutional:       General: He is not in acute distress.     Comments: This is a well-developed and well-nourished male, sitting in a chair   HENT:      Right Ear: Tympanic membrane, ear canal and external ear normal.      Left Ear: Tympanic membrane, ear canal and external ear normal.   Eyes:      General: No scleral icterus.     Pupils: Pupils are equal, round, and reactive to light.   Neck:      Vascular: No carotid bruit.   Cardiovascular:      Heart sounds: Normal heart sounds, S1 normal and S2 normal. No murmur heard.     No friction rub.   Pulmonary:      Effort: Pulmonary effort is normal.      Breath sounds: Normal breath sounds and air entry.   Abdominal:      Palpations: There is no hepatomegaly, splenomegaly or mass.   Musculoskeletal:         General: No swelling or deformity. Normal range of motion.      Cervical back: Neck supple.      Right lower leg: No edema.      Left lower leg: No edema.   Lymphadenopathy:      Cervical: No cervical adenopathy.      Upper Body:      Right upper body: No axillary adenopathy.      Left upper body: No axillary adenopathy.      Lower Body: No right inguinal adenopathy. No left inguinal adenopathy.   Neurological:      Cranial Nerves: Cranial nerves 2-12 are intact. No cranial nerve  deficit.      Sensory: No sensory deficit.      Motor: Motor function is intact. No weakness.      Gait: Gait is intact.      Deep Tendon Reflexes: Reflexes normal.      Comments: The patient is alert and oriented x2.   Psychiatric:         Mood and Affect: Mood normal. Mood is not anxious or depressed. Affect is not angry.         Behavior: Behavior is not agitated.         Thought Content: Thought content normal.         Judgment: Judgment normal.     LAB WORK: Laboratory studies reviewed.    Assessment/Plan  Problem List Items Addressed This Visit             ICD-10-CM    Benign prostatic hyperplasia N40.0    Hypertension (Chronic) I10    Sciatica, unspecified side M54.30    Dementia - Primary F03.90     Other Visit Diagnoses         Codes    Hyperlipidemia, unspecified hyperlipidemia type     E78.5    Osteoarthritis, unspecified osteoarthritis type, unspecified site     M19.90    Gastroesophageal reflux disease, unspecified whether esophagitis present     K21.9    At risk for falling     Z91.81        1. Dementia, unchanged, on hospice care.  2. Hypertension, med controlled.  3. Sciatica, on pain control.  4. Hyperlipidemia, on statin.  5. Osteoarthritis, on Tylenol.  6. BPH, on tamsulosin.  7. GERD, on PPI.  8. Fall risk, fall precautions.    Scribe Attestation  By signing my name below, IReny Scribe attest that this documentation has been prepared under the direction and in the presence of Yvonne Goldman MD.     All medical record entries made by the scribe were personally dictated by me I have reviewed the chart and agree the record accurately reflects my personal performance of his history physical examination and management      Electronically Signed By: Yvonne Goldman MD   4/20/25 10:31 PM

## 2025-04-15 VITALS
RESPIRATION RATE: 16 BRPM | SYSTOLIC BLOOD PRESSURE: 128 MMHG | TEMPERATURE: 97.7 F | HEART RATE: 78 BPM | DIASTOLIC BLOOD PRESSURE: 82 MMHG

## 2025-04-15 PROBLEM — R26.9 ABNORMAL GAIT: Status: RESOLVED | Noted: 2023-04-26 | Resolved: 2025-04-15

## 2025-04-15 PROBLEM — R41.841 COGNITIVE COMMUNICATION DISORDER: Status: RESOLVED | Noted: 2023-04-26 | Resolved: 2025-04-15

## 2025-04-15 ASSESSMENT — ENCOUNTER SYMPTOMS
FEVER: 0
CHILLS: 0

## 2025-04-16 NOTE — PROGRESS NOTES
PLACE OF SERVICE:  [facility]    This is a subsequent visit.    Subjective   Patient ID: Elian Betancourt is a 79 y.o. male who presents for Follow-up.    Mr. Elian Betancourt is an 79-year-old male with history of dementia with sciatica.  He has a difficult time ambulating and requires supportive care.  He is currently on hospice care.    Review of Systems   Constitutional:  Negative for chills and fever.   Cardiovascular:  Negative for chest pain.   All other systems reviewed and are negative.    Objective   /82   Pulse 78   Temp 36.5 °C (97.7 °F)   Resp 16     Physical Exam  Vitals reviewed.   Constitutional:       General: He is not in acute distress.     Comments: This is a well-developed and well-nourished male, sitting in a chair   HENT:      Right Ear: Tympanic membrane, ear canal and external ear normal.      Left Ear: Tympanic membrane, ear canal and external ear normal.   Eyes:      General: No scleral icterus.     Pupils: Pupils are equal, round, and reactive to light.   Neck:      Vascular: No carotid bruit.   Cardiovascular:      Heart sounds: Normal heart sounds, S1 normal and S2 normal. No murmur heard.     No friction rub.   Pulmonary:      Effort: Pulmonary effort is normal.      Breath sounds: Normal breath sounds and air entry.   Abdominal:      Palpations: There is no hepatomegaly, splenomegaly or mass.   Musculoskeletal:         General: No swelling or deformity. Normal range of motion.      Cervical back: Neck supple.      Right lower leg: No edema.      Left lower leg: No edema.   Lymphadenopathy:      Cervical: No cervical adenopathy.      Upper Body:      Right upper body: No axillary adenopathy.      Left upper body: No axillary adenopathy.      Lower Body: No right inguinal adenopathy. No left inguinal adenopathy.   Neurological:      Cranial Nerves: Cranial nerves 2-12 are intact. No cranial nerve deficit.      Sensory: No sensory deficit.      Motor: Motor function is  intact. No weakness.      Gait: Gait is intact.      Deep Tendon Reflexes: Reflexes normal.      Comments: The patient is alert and oriented x2.   Psychiatric:         Mood and Affect: Mood normal. Mood is not anxious or depressed. Affect is not angry.         Behavior: Behavior is not agitated.         Thought Content: Thought content normal.         Judgment: Judgment normal.     LAB WORK: Laboratory studies reviewed.    Assessment/Plan   Problem List Items Addressed This Visit             ICD-10-CM    Benign prostatic hyperplasia N40.0    Hypertension (Chronic) I10    Sciatica, unspecified side M54.30    Dementia - Primary F03.90     Other Visit Diagnoses         Codes    Hyperlipidemia, unspecified hyperlipidemia type     E78.5    Osteoarthritis, unspecified osteoarthritis type, unspecified site     M19.90    Gastroesophageal reflux disease, unspecified whether esophagitis present     K21.9    At risk for falling     Z91.81        1. Dementia, unchanged, on hospice care.  2. Hypertension, med controlled.  3. Sciatica, on pain control.  4. Hyperlipidemia, on statin.  5. Osteoarthritis, on Tylenol.  6. BPH, on tamsulosin.  7. GERD, on PPI.  8. Fall risk, fall precautions.    Scribe Attestation  By signing my name below, IReny Scribe attest that this documentation has been prepared under the direction and in the presence of Yvonne Goldman MD.     All medical record entries made by the scribe were personally dictated by me I have reviewed the chart and agree the record accurately reflects my personal performance of his history physical examination and management

## 2025-04-17 ENCOUNTER — APPOINTMENT (OUTPATIENT)
Dept: PRIMARY CARE | Facility: CLINIC | Age: 80
End: 2025-04-17
Payer: MEDICARE

## 2025-05-03 ENCOUNTER — NURSING HOME VISIT (OUTPATIENT)
Dept: POST ACUTE CARE | Facility: EXTERNAL LOCATION | Age: 80
End: 2025-05-03
Payer: MEDICARE

## 2025-05-03 DIAGNOSIS — M19.90 OSTEOARTHRITIS, UNSPECIFIED OSTEOARTHRITIS TYPE, UNSPECIFIED SITE: ICD-10-CM

## 2025-05-03 DIAGNOSIS — Z91.81 AT RISK FOR FALLING: ICD-10-CM

## 2025-05-03 DIAGNOSIS — F03.90 DEMENTIA, UNSPECIFIED DEMENTIA SEVERITY, UNSPECIFIED DEMENTIA TYPE, UNSPECIFIED WHETHER BEHAVIORAL, PSYCHOTIC, OR MOOD DISTURBANCE OR ANXIETY (MULTI): Primary | ICD-10-CM

## 2025-05-03 DIAGNOSIS — K21.9 GASTROESOPHAGEAL REFLUX DISEASE, UNSPECIFIED WHETHER ESOPHAGITIS PRESENT: ICD-10-CM

## 2025-05-03 DIAGNOSIS — E78.5 HYPERLIPIDEMIA, UNSPECIFIED HYPERLIPIDEMIA TYPE: ICD-10-CM

## 2025-05-03 DIAGNOSIS — I10 HYPERTENSION, UNSPECIFIED TYPE: ICD-10-CM

## 2025-05-03 DIAGNOSIS — N40.0 BENIGN PROSTATIC HYPERPLASIA, UNSPECIFIED WHETHER LOWER URINARY TRACT SYMPTOMS PRESENT: ICD-10-CM

## 2025-05-03 DIAGNOSIS — M54.30 SCIATICA, UNSPECIFIED LATERALITY: ICD-10-CM

## 2025-05-03 DIAGNOSIS — R53.1 WEAKNESS: ICD-10-CM

## 2025-05-03 PROCEDURE — 99308 SBSQ NF CARE LOW MDM 20: CPT | Performed by: INTERNAL MEDICINE

## 2025-05-03 NOTE — LETTER
Patient: Elian Betancourt  : 1945    Encounter Date: 2025    PLACE OF SERVICE:  [facility].    This is a subsequent visit.    Subjective  Patient ID: Elian Betancourt is a 79 y.o. male who presents for Follow-up.    Mr. Elian Betancourt is an 79-year-old male with history of dementia with sciatica.  He has a difficult time ambulating.  He requires supportive care.    Review of Systems   Constitutional:  Negative for chills and fever.   Cardiovascular:  Negative for chest pain.   All other systems reviewed and are negative.    Objective  /82   Pulse 78   Temp 36.5 °C (97.7 °F)   Resp 16     Physical Exam  Vitals reviewed.   Constitutional:       General: He is not in acute distress.     Comments: This is a well-developed, well-nourished male, sitting in a chair.   HENT:      Right Ear: Tympanic membrane, ear canal and external ear normal.      Left Ear: Tympanic membrane, ear canal and external ear normal.   Eyes:      General: No scleral icterus.     Pupils: Pupils are equal, round, and reactive to light.   Neck:      Vascular: No carotid bruit.   Cardiovascular:      Heart sounds: Normal heart sounds, S1 normal and S2 normal. No murmur heard.     No friction rub.   Pulmonary:      Effort: Pulmonary effort is normal.      Breath sounds: Normal breath sounds and air entry.   Abdominal:      Palpations: There is no hepatomegaly, splenomegaly or mass.   Musculoskeletal:         General: No swelling or deformity. Normal range of motion.      Cervical back: Neck supple.      Right lower leg: No edema.      Left lower leg: No edema.   Lymphadenopathy:      Cervical: No cervical adenopathy.      Upper Body:      Right upper body: No axillary adenopathy.      Left upper body: No axillary adenopathy.      Lower Body: No right inguinal adenopathy. No left inguinal adenopathy.   Neurological:      Mental Status: He is alert.      Cranial Nerves: Cranial nerves 2-12 are intact. No cranial nerve  deficit.      Sensory: No sensory deficit.      Motor: Motor function is intact. No weakness.      Gait: Gait is intact.      Deep Tendon Reflexes: Reflexes normal.      Comments: The patient is oriented x2.   Psychiatric:         Mood and Affect: Mood normal. Mood is not anxious or depressed. Affect is not angry.         Behavior: Behavior is not agitated.         Thought Content: Thought content normal.         Judgment: Judgment normal.     LAB WORK:  Laboratory studies were reviewed.    Assessment/Plan  Problem List Items Addressed This Visit           ICD-10-CM       Cardiac and Vasculature    Hypertension (Chronic) I10       Genitourinary and Reproductive    Benign prostatic hyperplasia N40.0       Musculoskeletal and Injuries    Sciatica, unspecified side M54.30       Neuro    Dementia - Primary F03.90     Other Visit Diagnoses         Codes      Hyperlipidemia, unspecified hyperlipidemia type     E78.5      Gastroesophageal reflux disease, unspecified whether esophagitis present     K21.9      Osteoarthritis, unspecified osteoarthritis type, unspecified site     M19.90      Weakness     R53.1      At risk for falling     Z91.81        1. Dementia, unchanged.  2. Sciatica, on pain control.  3. Hyperlipidemia, on statin.  4. Hypertension, medically controlled.  5. BPH, on tamsulosin.  6. GERD, on PPI.  7. Osteoarthritis, on Tylenol.  8. Weakness, on PT/OT.  9. Fall risk, on fall precautions.    Scribe Attestation  By signing my name below, I, Price Espinoza attest that this documentation has been prepared under the direction and in the presence of Yvonne Goldman MD.     All medical record entries made by the scribe were personally dictated by me I have reviewed the chart and agree the record accurately reflects my personal performance of his history physical examination and management      Electronically Signed By: Yvonne Goldman MD   5/6/25 11:49 PM

## 2025-05-05 VITALS
TEMPERATURE: 97.7 F | RESPIRATION RATE: 16 BRPM | SYSTOLIC BLOOD PRESSURE: 128 MMHG | DIASTOLIC BLOOD PRESSURE: 82 MMHG | HEART RATE: 78 BPM

## 2025-05-05 ASSESSMENT — ENCOUNTER SYMPTOMS
CHILLS: 0
FEVER: 0

## 2025-05-05 NOTE — PROGRESS NOTES
PLACE OF SERVICE:  [facility].    This is a subsequent visit.    Subjective   Patient ID: Elian Betancourt is a 79 y.o. male who presents for Follow-up.    Mr. Elian Betancourt is an 79-year-old male with history of dementia with sciatica.  He has a difficult time ambulating.  He requires supportive care.    Review of Systems   Constitutional:  Negative for chills and fever.   Cardiovascular:  Negative for chest pain.   All other systems reviewed and are negative.    Objective   /82   Pulse 78   Temp 36.5 °C (97.7 °F)   Resp 16     Physical Exam  Vitals reviewed.   Constitutional:       General: He is not in acute distress.     Comments: This is a well-developed, well-nourished male, sitting in a chair.   HENT:      Right Ear: Tympanic membrane, ear canal and external ear normal.      Left Ear: Tympanic membrane, ear canal and external ear normal.   Eyes:      General: No scleral icterus.     Pupils: Pupils are equal, round, and reactive to light.   Neck:      Vascular: No carotid bruit.   Cardiovascular:      Heart sounds: Normal heart sounds, S1 normal and S2 normal. No murmur heard.     No friction rub.   Pulmonary:      Effort: Pulmonary effort is normal.      Breath sounds: Normal breath sounds and air entry.   Abdominal:      Palpations: There is no hepatomegaly, splenomegaly or mass.   Musculoskeletal:         General: No swelling or deformity. Normal range of motion.      Cervical back: Neck supple.      Right lower leg: No edema.      Left lower leg: No edema.   Lymphadenopathy:      Cervical: No cervical adenopathy.      Upper Body:      Right upper body: No axillary adenopathy.      Left upper body: No axillary adenopathy.      Lower Body: No right inguinal adenopathy. No left inguinal adenopathy.   Neurological:      Mental Status: He is alert.      Cranial Nerves: Cranial nerves 2-12 are intact. No cranial nerve deficit.      Sensory: No sensory deficit.      Motor: Motor function is  intact. No weakness.      Gait: Gait is intact.      Deep Tendon Reflexes: Reflexes normal.      Comments: The patient is oriented x2.   Psychiatric:         Mood and Affect: Mood normal. Mood is not anxious or depressed. Affect is not angry.         Behavior: Behavior is not agitated.         Thought Content: Thought content normal.         Judgment: Judgment normal.     LAB WORK:  Laboratory studies were reviewed.    Assessment/Plan   Problem List Items Addressed This Visit           ICD-10-CM       Cardiac and Vasculature    Hypertension (Chronic) I10       Genitourinary and Reproductive    Benign prostatic hyperplasia N40.0       Musculoskeletal and Injuries    Sciatica, unspecified side M54.30       Neuro    Dementia - Primary F03.90     Other Visit Diagnoses         Codes      Hyperlipidemia, unspecified hyperlipidemia type     E78.5      Gastroesophageal reflux disease, unspecified whether esophagitis present     K21.9      Osteoarthritis, unspecified osteoarthritis type, unspecified site     M19.90      Weakness     R53.1      At risk for falling     Z91.81        1. Dementia, unchanged.  2. Sciatica, on pain control.  3. Hyperlipidemia, on statin.  4. Hypertension, medically controlled.  5. BPH, on tamsulosin.  6. GERD, on PPI.  7. Osteoarthritis, on Tylenol.  8. Weakness, on PT/OT.  9. Fall risk, on fall precautions.    Scribe Attestation  By signing my name below, I, Price Espinoza attest that this documentation has been prepared under the direction and in the presence of Yvonne Goldman MD.     All medical record entries made by the scribe were personally dictated by me I have reviewed the chart and agree the record accurately reflects my personal performance of his history physical examination and management

## 2025-06-01 ENCOUNTER — NURSING HOME VISIT (OUTPATIENT)
Dept: POST ACUTE CARE | Facility: EXTERNAL LOCATION | Age: 80
End: 2025-06-01
Payer: MEDICARE

## 2025-06-01 DIAGNOSIS — M13.0 POLYARTICULAR ARTHRITIS: Primary | ICD-10-CM

## 2025-06-01 DIAGNOSIS — Z91.81 AT RISK FOR FALLING: ICD-10-CM

## 2025-06-01 DIAGNOSIS — R53.1 WEAKNESS: ICD-10-CM

## 2025-06-01 DIAGNOSIS — K21.9 GASTROESOPHAGEAL REFLUX DISEASE, UNSPECIFIED WHETHER ESOPHAGITIS PRESENT: ICD-10-CM

## 2025-06-01 DIAGNOSIS — N40.0 BENIGN PROSTATIC HYPERPLASIA, UNSPECIFIED WHETHER LOWER URINARY TRACT SYMPTOMS PRESENT: ICD-10-CM

## 2025-06-01 DIAGNOSIS — M54.30 SCIATICA, UNSPECIFIED LATERALITY: ICD-10-CM

## 2025-06-01 DIAGNOSIS — I10 HYPERTENSION, UNSPECIFIED TYPE: ICD-10-CM

## 2025-06-01 DIAGNOSIS — E78.5 HYPERLIPIDEMIA, UNSPECIFIED HYPERLIPIDEMIA TYPE: ICD-10-CM

## 2025-06-01 PROCEDURE — 99308 SBSQ NF CARE LOW MDM 20: CPT | Performed by: INTERNAL MEDICINE

## 2025-06-01 NOTE — LETTER
Patient: Elian Betancourt  : 1945    Encounter Date: 2025    PLACE OF SERVICE:  [facility].    This is a subsequent visit.    Subjective  Patient ID: Elian Betancourt is a 79 y.o. male who presents for Follow-up.    Mr. Elian Betancourt is an 79-year-old male with history of polyarticular arthritis.  He has a difficult time ambulating and requires supportive care.    Review of Systems   Constitutional:  Negative for chills and fever.   Cardiovascular:  Negative for chest pain.   All other systems reviewed and are negative.    Objective  /76   Pulse 78   Temp 36.6 °C (97.9 °F)   Resp 16     Physical Exam  Vitals reviewed.   Constitutional:       General: He is not in acute distress.     Comments: This is a well-developed, well-nourished male, sitting in a chair.   HENT:      Right Ear: Tympanic membrane, ear canal and external ear normal.      Left Ear: Tympanic membrane, ear canal and external ear normal.   Eyes:      General: No scleral icterus.     Pupils: Pupils are equal, round, and reactive to light.   Neck:      Vascular: No carotid bruit.   Cardiovascular:      Heart sounds: Normal heart sounds, S1 normal and S2 normal. No murmur heard.     No friction rub.   Pulmonary:      Effort: Pulmonary effort is normal.      Breath sounds: Normal breath sounds and air entry.   Abdominal:      Palpations: There is no hepatomegaly, splenomegaly or mass.   Musculoskeletal:         General: No swelling or deformity. Normal range of motion.      Cervical back: Neck supple.      Right lower leg: No edema.      Left lower leg: No edema.   Lymphadenopathy:      Cervical: No cervical adenopathy.      Upper Body:      Right upper body: No axillary adenopathy.      Left upper body: No axillary adenopathy.      Lower Body: No right inguinal adenopathy. No left inguinal adenopathy.   Neurological:      Mental Status: He is oriented to person, place, and time.      Cranial Nerves: Cranial nerves  2-12 are intact. No cranial nerve deficit.      Sensory: No sensory deficit.      Motor: Motor function is intact. No weakness.      Gait: Gait is intact.      Deep Tendon Reflexes: Reflexes normal.   Psychiatric:         Mood and Affect: Mood normal. Mood is not anxious or depressed. Affect is not angry.         Behavior: Behavior is not agitated.         Thought Content: Thought content normal.         Judgment: Judgment normal.     LAB WORK: Laboratory studies were reviewed.    Assessment/Plan  Problem List Items Addressed This Visit           ICD-10-CM       Cardiac and Vasculature    Hypertension (Chronic) I10       Genitourinary and Reproductive    Benign prostatic hyperplasia N40.0       Musculoskeletal and Injuries    Sciatica, unspecified side M54.30     Other Visit Diagnoses         Codes      Polyarticular arthritis    -  Primary M13.0      Hyperlipidemia, unspecified hyperlipidemia type     E78.5      Gastroesophageal reflux disease, unspecified whether esophagitis present     K21.9      Weakness     R53.1      At risk for falling     Z91.81        1. Polyarticular arthritis, on pain control.  2. Sciatica, on pain control.  3. Hypertension, med controlled.  4. BPH, on tamsulosin.  5. Hyperlipidemia, on statin.  6. GERD, on PPI.  7. Weakness, on PT/OT.  8. Fall risk, on fall precautions.    Scribe Attestation  By signing my name below, I, Price Espinoza attest that this documentation has been prepared under the direction and in the presence of Yvonne Goldman MD.     All medical record entries made by the scribe were personally dictated by me I have reviewed the chart and agree the record accurately reflects my personal performance of his history physical examination and management      Electronically Signed By: Yvonne Goldman MD   6/8/25 11:49 PM

## 2025-06-06 VITALS
RESPIRATION RATE: 16 BRPM | DIASTOLIC BLOOD PRESSURE: 76 MMHG | SYSTOLIC BLOOD PRESSURE: 124 MMHG | TEMPERATURE: 97.9 F | HEART RATE: 78 BPM

## 2025-06-06 ASSESSMENT — ENCOUNTER SYMPTOMS
FEVER: 0
CHILLS: 0

## 2025-06-06 NOTE — PROGRESS NOTES
PLACE OF SERVICE:  [facility].    This is a subsequent visit.    Subjective   Patient ID: Elian Betancourt is a 79 y.o. male who presents for Follow-up.    Mr. Elian Betancourt is an 79-year-old male with history of polyarticular arthritis.  He has a difficult time ambulating and requires supportive care.    Review of Systems   Constitutional:  Negative for chills and fever.   Cardiovascular:  Negative for chest pain.   All other systems reviewed and are negative.    Objective   /76   Pulse 78   Temp 36.6 °C (97.9 °F)   Resp 16     Physical Exam  Vitals reviewed.   Constitutional:       General: He is not in acute distress.     Comments: This is a well-developed, well-nourished male, sitting in a chair.   HENT:      Right Ear: Tympanic membrane, ear canal and external ear normal.      Left Ear: Tympanic membrane, ear canal and external ear normal.   Eyes:      General: No scleral icterus.     Pupils: Pupils are equal, round, and reactive to light.   Neck:      Vascular: No carotid bruit.   Cardiovascular:      Heart sounds: Normal heart sounds, S1 normal and S2 normal. No murmur heard.     No friction rub.   Pulmonary:      Effort: Pulmonary effort is normal.      Breath sounds: Normal breath sounds and air entry.   Abdominal:      Palpations: There is no hepatomegaly, splenomegaly or mass.   Musculoskeletal:         General: No swelling or deformity. Normal range of motion.      Cervical back: Neck supple.      Right lower leg: No edema.      Left lower leg: No edema.   Lymphadenopathy:      Cervical: No cervical adenopathy.      Upper Body:      Right upper body: No axillary adenopathy.      Left upper body: No axillary adenopathy.      Lower Body: No right inguinal adenopathy. No left inguinal adenopathy.   Neurological:      Mental Status: He is oriented to person, place, and time.      Cranial Nerves: Cranial nerves 2-12 are intact. No cranial nerve deficit.      Sensory: No sensory  deficit.      Motor: Motor function is intact. No weakness.      Gait: Gait is intact.      Deep Tendon Reflexes: Reflexes normal.   Psychiatric:         Mood and Affect: Mood normal. Mood is not anxious or depressed. Affect is not angry.         Behavior: Behavior is not agitated.         Thought Content: Thought content normal.         Judgment: Judgment normal.     LAB WORK: Laboratory studies were reviewed.    Assessment/Plan   Problem List Items Addressed This Visit           ICD-10-CM       Cardiac and Vasculature    Hypertension (Chronic) I10       Genitourinary and Reproductive    Benign prostatic hyperplasia N40.0       Musculoskeletal and Injuries    Sciatica, unspecified side M54.30     Other Visit Diagnoses         Codes      Polyarticular arthritis    -  Primary M13.0      Hyperlipidemia, unspecified hyperlipidemia type     E78.5      Gastroesophageal reflux disease, unspecified whether esophagitis present     K21.9      Weakness     R53.1      At risk for falling     Z91.81        1. Polyarticular arthritis, on pain control.  2. Sciatica, on pain control.  3. Hypertension, med controlled.  4. BPH, on tamsulosin.  5. Hyperlipidemia, on statin.  6. GERD, on PPI.  7. Weakness, on PT/OT.  8. Fall risk, on fall precautions.    Scribe Attestation  By signing my name below, I, Price Espinoza attest that this documentation has been prepared under the direction and in the presence of Yvonne Goldman MD.     All medical record entries made by the scribe were personally dictated by me I have reviewed the chart and agree the record accurately reflects my personal performance of his history physical examination and management

## 2025-07-06 ENCOUNTER — NURSING HOME VISIT (OUTPATIENT)
Dept: POST ACUTE CARE | Facility: EXTERNAL LOCATION | Age: 80
End: 2025-07-06
Payer: MEDICARE

## 2025-07-06 DIAGNOSIS — E78.5 HYPERLIPIDEMIA, UNSPECIFIED HYPERLIPIDEMIA TYPE: ICD-10-CM

## 2025-07-06 DIAGNOSIS — M13.0 POLYARTICULAR ARTHRITIS: ICD-10-CM

## 2025-07-06 DIAGNOSIS — Z91.81 AT RISK FOR FALLING: ICD-10-CM

## 2025-07-06 DIAGNOSIS — I10 HYPERTENSION, UNSPECIFIED TYPE: ICD-10-CM

## 2025-07-06 DIAGNOSIS — K21.9 GASTROESOPHAGEAL REFLUX DISEASE, UNSPECIFIED WHETHER ESOPHAGITIS PRESENT: ICD-10-CM

## 2025-07-06 DIAGNOSIS — M54.30 SCIATICA, UNSPECIFIED LATERALITY: Primary | ICD-10-CM

## 2025-07-06 DIAGNOSIS — N40.0 BENIGN PROSTATIC HYPERPLASIA, UNSPECIFIED WHETHER LOWER URINARY TRACT SYMPTOMS PRESENT: ICD-10-CM

## 2025-07-06 PROCEDURE — 99308 SBSQ NF CARE LOW MDM 20: CPT | Performed by: INTERNAL MEDICINE

## 2025-07-06 NOTE — LETTER
Patient: Elian Betancourt  : 1945    Encounter Date: 2025    PLACE OF SERVICE:  [facility]    This is a subsequent visit.    Subjective  Patient ID: Elian Betancourt is a 79 y.o. male who presents for Follow-up.    Mr. Elian Betancourt is an 79-year-old male with a history of sciatica with polyarticular arthritis.  He has a very difficult time ambulating and requires supportive care.    Review of Systems   Constitutional:  Negative for chills and fever.   Cardiovascular:  Negative for chest pain.   All other systems reviewed and are negative.    Objective  /70   Pulse 76   Temp 36.5 °C (97.7 °F)   Resp 16     Physical Exam  Vitals reviewed.   Constitutional:       General: He is not in acute distress.     Comments: This is a well-developed and well-nourished male, sitting in a chair.   HENT:      Right Ear: Tympanic membrane, ear canal and external ear normal.      Left Ear: Tympanic membrane, ear canal and external ear normal.   Eyes:      General: No scleral icterus.     Pupils: Pupils are equal, round, and reactive to light.   Neck:      Vascular: No carotid bruit.   Cardiovascular:      Heart sounds: Normal heart sounds, S1 normal and S2 normal. No murmur heard.     No friction rub.   Pulmonary:      Effort: Pulmonary effort is normal.      Breath sounds: Normal breath sounds and air entry.   Abdominal:      Palpations: There is no hepatomegaly, splenomegaly or mass.   Musculoskeletal:         General: No swelling or deformity. Normal range of motion.      Cervical back: Neck supple.      Right lower leg: No edema.      Left lower leg: No edema.   Lymphadenopathy:      Cervical: No cervical adenopathy.      Upper Body:      Right upper body: No axillary adenopathy.      Left upper body: No axillary adenopathy.      Lower Body: No right inguinal adenopathy. No left inguinal adenopathy.   Neurological:      Mental Status: He is oriented to person, place, and time.      Cranial  Nerves: Cranial nerves 2-12 are intact. No cranial nerve deficit.      Sensory: No sensory deficit.      Motor: Motor function is intact. No weakness.      Gait: Gait is intact.      Deep Tendon Reflexes: Reflexes normal.   Psychiatric:         Mood and Affect: Mood normal. Mood is not anxious or depressed. Affect is not angry.         Behavior: Behavior is not agitated.         Thought Content: Thought content normal.         Judgment: Judgment normal.     LAB WORK:  Laboratory studies were reviewed.    Assessment/Plan  Problem List Items Addressed This Visit           ICD-10-CM       Cardiac and Vasculature    Hypertension (Chronic) I10       Genitourinary and Reproductive    Benign prostatic hyperplasia N40.0       Musculoskeletal and Injuries    Sciatica, unspecified side - Primary M54.30     Other Visit Diagnoses         Codes      Polyarticular arthritis     M13.0      Hyperlipidemia, unspecified hyperlipidemia type     E78.5      Gastroesophageal reflux disease, unspecified whether esophagitis present     K21.9      At risk for falling     Z91.81        1. Sciatica, on pain control.  2. Polyarticular arthritis, on pain control.  3. Hyperlipidemia, on statin.  4. BPH, on tamsulosin.  5. Hypertension, medically controlled.  6. GERD, on PPI.  7. Fall risk, fall precautions.    Scribe Attestation  By signing my name below, ICheryl Scribe attest that this documentation has been prepared under the direction and in the presence of Yvonne Goldman MD.     All medical record entries made by the scribe were personally dictated by me I have reviewed the chart and agree the record accurately reflects my personal performance of his history physical examination and management      Electronically Signed By: Yvonne Goldman MD   7/11/25 12:21 AM

## 2025-07-08 VITALS
RESPIRATION RATE: 16 BRPM | SYSTOLIC BLOOD PRESSURE: 126 MMHG | DIASTOLIC BLOOD PRESSURE: 70 MMHG | HEART RATE: 76 BPM | TEMPERATURE: 97.7 F

## 2025-07-08 ASSESSMENT — ENCOUNTER SYMPTOMS
FEVER: 0
CHILLS: 0

## 2025-07-08 NOTE — PROGRESS NOTES
PLACE OF SERVICE:  [facility]    This is a subsequent visit.    Subjective   Patient ID: Elian Betancourt is a 79 y.o. male who presents for Follow-up.    Mr. Elian Betancourt is an 79-year-old male with a history of sciatica with polyarticular arthritis.  He has a very difficult time ambulating and requires supportive care.    Review of Systems   Constitutional:  Negative for chills and fever.   Cardiovascular:  Negative for chest pain.   All other systems reviewed and are negative.    Objective   /70   Pulse 76   Temp 36.5 °C (97.7 °F)   Resp 16     Physical Exam  Vitals reviewed.   Constitutional:       General: He is not in acute distress.     Comments: This is a well-developed and well-nourished male, sitting in a chair.   HENT:      Right Ear: Tympanic membrane, ear canal and external ear normal.      Left Ear: Tympanic membrane, ear canal and external ear normal.   Eyes:      General: No scleral icterus.     Pupils: Pupils are equal, round, and reactive to light.   Neck:      Vascular: No carotid bruit.   Cardiovascular:      Heart sounds: Normal heart sounds, S1 normal and S2 normal. No murmur heard.     No friction rub.   Pulmonary:      Effort: Pulmonary effort is normal.      Breath sounds: Normal breath sounds and air entry.   Abdominal:      Palpations: There is no hepatomegaly, splenomegaly or mass.   Musculoskeletal:         General: No swelling or deformity. Normal range of motion.      Cervical back: Neck supple.      Right lower leg: No edema.      Left lower leg: No edema.   Lymphadenopathy:      Cervical: No cervical adenopathy.      Upper Body:      Right upper body: No axillary adenopathy.      Left upper body: No axillary adenopathy.      Lower Body: No right inguinal adenopathy. No left inguinal adenopathy.   Neurological:      Mental Status: He is oriented to person, place, and time.      Cranial Nerves: Cranial nerves 2-12 are intact. No cranial nerve deficit.       Sensory: No sensory deficit.      Motor: Motor function is intact. No weakness.      Gait: Gait is intact.      Deep Tendon Reflexes: Reflexes normal.   Psychiatric:         Mood and Affect: Mood normal. Mood is not anxious or depressed. Affect is not angry.         Behavior: Behavior is not agitated.         Thought Content: Thought content normal.         Judgment: Judgment normal.     LAB WORK:  Laboratory studies were reviewed.    Assessment/Plan   Problem List Items Addressed This Visit           ICD-10-CM       Cardiac and Vasculature    Hypertension (Chronic) I10       Genitourinary and Reproductive    Benign prostatic hyperplasia N40.0       Musculoskeletal and Injuries    Sciatica, unspecified side - Primary M54.30     Other Visit Diagnoses         Codes      Polyarticular arthritis     M13.0      Hyperlipidemia, unspecified hyperlipidemia type     E78.5      Gastroesophageal reflux disease, unspecified whether esophagitis present     K21.9      At risk for falling     Z91.81        1. Sciatica, on pain control.  2. Polyarticular arthritis, on pain control.  3. Hyperlipidemia, on statin.  4. BPH, on tamsulosin.  5. Hypertension, medically controlled.  6. GERD, on PPI.  7. Fall risk, fall precautions.    Scribe Attestation  By signing my name below, I, Price Espinoza attest that this documentation has been prepared under the direction and in the presence of Yvonne Goldman MD.     All medical record entries made by the scribdickson were personally dictated by me I have reviewed the chart and agree the record accurately reflects my personal performance of his history physical examination and management

## 2025-08-02 ENCOUNTER — NURSING HOME VISIT (OUTPATIENT)
Dept: POST ACUTE CARE | Facility: EXTERNAL LOCATION | Age: 80
End: 2025-08-02
Payer: MEDICARE

## 2025-08-02 DIAGNOSIS — K21.9 GASTROESOPHAGEAL REFLUX DISEASE, UNSPECIFIED WHETHER ESOPHAGITIS PRESENT: ICD-10-CM

## 2025-08-02 DIAGNOSIS — E78.5 HYPERLIPIDEMIA, UNSPECIFIED HYPERLIPIDEMIA TYPE: ICD-10-CM

## 2025-08-02 DIAGNOSIS — N40.0 BENIGN PROSTATIC HYPERPLASIA, UNSPECIFIED WHETHER LOWER URINARY TRACT SYMPTOMS PRESENT: ICD-10-CM

## 2025-08-02 DIAGNOSIS — M54.30 SCIATICA, UNSPECIFIED LATERALITY: ICD-10-CM

## 2025-08-02 DIAGNOSIS — Z91.81 AT RISK FOR FALLING: ICD-10-CM

## 2025-08-02 DIAGNOSIS — I10 HYPERTENSION, UNSPECIFIED TYPE: ICD-10-CM

## 2025-08-02 DIAGNOSIS — M13.0 POLYARTICULAR ARTHRITIS: Primary | ICD-10-CM

## 2025-08-02 PROCEDURE — 99308 SBSQ NF CARE LOW MDM 20: CPT | Performed by: INTERNAL MEDICINE

## 2025-08-17 ENCOUNTER — NURSING HOME VISIT (OUTPATIENT)
Dept: POST ACUTE CARE | Facility: EXTERNAL LOCATION | Age: 80
End: 2025-08-17
Payer: MEDICARE

## 2025-08-17 DIAGNOSIS — M54.30 SCIATICA, UNSPECIFIED LATERALITY: Primary | ICD-10-CM

## 2025-08-17 DIAGNOSIS — Z91.81 AT RISK FOR FALLING: ICD-10-CM

## 2025-08-17 DIAGNOSIS — E78.5 HYPERLIPIDEMIA, UNSPECIFIED HYPERLIPIDEMIA TYPE: ICD-10-CM

## 2025-08-17 DIAGNOSIS — N40.0 BENIGN PROSTATIC HYPERPLASIA, UNSPECIFIED WHETHER LOWER URINARY TRACT SYMPTOMS PRESENT: ICD-10-CM

## 2025-08-17 DIAGNOSIS — M13.0 POLYARTICULAR ARTHRITIS: ICD-10-CM

## 2025-08-17 DIAGNOSIS — I10 HYPERTENSION, UNSPECIFIED TYPE: ICD-10-CM

## 2025-08-17 DIAGNOSIS — K21.9 GASTROESOPHAGEAL REFLUX DISEASE, UNSPECIFIED WHETHER ESOPHAGITIS PRESENT: ICD-10-CM

## 2025-08-17 PROCEDURE — 99308 SBSQ NF CARE LOW MDM 20: CPT | Performed by: INTERNAL MEDICINE

## 2025-08-18 VITALS
RESPIRATION RATE: 16 BRPM | SYSTOLIC BLOOD PRESSURE: 120 MMHG | TEMPERATURE: 97.9 F | HEART RATE: 82 BPM | DIASTOLIC BLOOD PRESSURE: 82 MMHG

## 2025-08-18 VITALS
TEMPERATURE: 98.1 F | RESPIRATION RATE: 16 BRPM | HEART RATE: 78 BPM | SYSTOLIC BLOOD PRESSURE: 126 MMHG | DIASTOLIC BLOOD PRESSURE: 78 MMHG

## 2025-08-18 ASSESSMENT — ENCOUNTER SYMPTOMS
FEVER: 0
CHILLS: 0
FEVER: 0
CHILLS: 0